# Patient Record
Sex: MALE | Race: WHITE | Employment: OTHER | URBAN - METROPOLITAN AREA
[De-identification: names, ages, dates, MRNs, and addresses within clinical notes are randomized per-mention and may not be internally consistent; named-entity substitution may affect disease eponyms.]

---

## 2017-10-23 PROBLEM — M50.30 DEGENERATIVE DISC DISEASE, CERVICAL: Status: ACTIVE | Noted: 2017-10-23

## 2018-04-09 DIAGNOSIS — F41.1 GAD (GENERALIZED ANXIETY DISORDER): ICD-10-CM

## 2018-04-09 RX ORDER — TRIAMCINOLONE ACETONIDE 1 MG/G
CREAM TOPICAL
Qty: 80 G | Refills: 0 | Status: SHIPPED | OUTPATIENT
Start: 2018-04-09 | End: 2019-01-15

## 2018-04-09 RX ORDER — BUPROPION HYDROCHLORIDE 150 MG/1
TABLET ORAL
Qty: 30 TABLET | Refills: 4 | Status: SHIPPED | OUTPATIENT
Start: 2018-04-09 | End: 2018-09-14 | Stop reason: SDUPTHER

## 2018-04-10 ENCOUNTER — HOSPITAL ENCOUNTER (OUTPATIENT)
Age: 58
Discharge: HOME | End: 2018-04-12
Payer: MEDICARE

## 2018-04-10 ENCOUNTER — OFFICE VISIT (OUTPATIENT)
Dept: PRIMARY CARE CLINIC | Age: 58
End: 2018-04-10
Payer: MEDICARE

## 2018-04-10 VITALS
TEMPERATURE: 98.2 F | BODY MASS INDEX: 32.57 KG/M2 | HEART RATE: 72 BPM | DIASTOLIC BLOOD PRESSURE: 70 MMHG | OXYGEN SATURATION: 98 % | WEIGHT: 227 LBS | SYSTOLIC BLOOD PRESSURE: 110 MMHG

## 2018-04-10 PROCEDURE — G8417 CALC BMI ABV UP PARAM F/U: HCPCS | Performed by: FAMILY MEDICINE

## 2018-04-10 PROCEDURE — 1036F TOBACCO NON-USER: CPT | Performed by: FAMILY MEDICINE

## 2018-04-10 PROCEDURE — 80061 LIPID PANEL: CPT

## 2018-04-10 PROCEDURE — 83036 HEMOGLOBIN GLYCOSYLATED A1C: CPT

## 2018-04-10 PROCEDURE — 3017F COLORECTAL CA SCREEN DOC REV: CPT | Performed by: FAMILY MEDICINE

## 2018-04-10 PROCEDURE — 3045F PR MOST RECENT HEMOGLOBIN A1C LEVEL 7.0-9.0%: CPT | Performed by: FAMILY MEDICINE

## 2018-04-10 PROCEDURE — G8427 DOCREV CUR MEDS BY ELIG CLIN: HCPCS | Performed by: FAMILY MEDICINE

## 2018-04-10 PROCEDURE — 80053 COMPREHEN METABOLIC PANEL: CPT

## 2018-04-10 PROCEDURE — 99213 OFFICE O/P EST LOW 20 MIN: CPT | Performed by: FAMILY MEDICINE

## 2018-04-10 ASSESSMENT — ENCOUNTER SYMPTOMS
VISUAL CHANGE: 0
BLURRED VISION: 0
CONSTIPATION: 0
DIARRHEA: 0

## 2018-04-11 LAB
ALBUMIN SERPL-MCNC: 4.3 G/DL (ref 3.5–5.2)
ALP BLD-CCNC: 87 U/L (ref 40–129)
ALT SERPL-CCNC: 20 U/L (ref 0–40)
ANION GAP SERPL CALCULATED.3IONS-SCNC: 17 MMOL/L (ref 7–16)
AST SERPL-CCNC: 22 U/L (ref 0–39)
BILIRUB SERPL-MCNC: 0.6 MG/DL (ref 0–1.2)
BUN BLDV-MCNC: 12 MG/DL (ref 8–23)
CALCIUM SERPL-MCNC: 9.2 MG/DL (ref 8.6–10.2)
CHLORIDE BLD-SCNC: 101 MMOL/L (ref 98–107)
CHOLESTEROL, TOTAL: 155 MG/DL (ref 0–199)
CO2: 23 MMOL/L (ref 22–29)
CREAT SERPL-MCNC: 0.9 MG/DL (ref 0.7–1.2)
GFR AFRICAN AMERICAN: >60
GFR NON-AFRICAN AMERICAN: >60 ML/MIN/1.73
GLUCOSE BLD-MCNC: 87 MG/DL (ref 74–109)
HBA1C MFR BLD: 6.3 % (ref 4.8–5.9)
HDLC SERPL-MCNC: 41 MG/DL
LDL CHOLESTEROL CALCULATED: 81 MG/DL (ref 0–99)
POTASSIUM SERPL-SCNC: 5.3 MMOL/L (ref 3.5–5)
SODIUM BLD-SCNC: 141 MMOL/L (ref 132–146)
TOTAL PROTEIN: 7 G/DL (ref 6.4–8.3)
TRIGL SERPL-MCNC: 167 MG/DL (ref 0–149)
VLDLC SERPL CALC-MCNC: 33 MG/DL

## 2018-04-12 ENCOUNTER — TELEPHONE (OUTPATIENT)
Dept: PRIMARY CARE CLINIC | Age: 58
End: 2018-04-12

## 2018-04-16 DIAGNOSIS — R42 DIZZINESS: ICD-10-CM

## 2018-04-16 RX ORDER — TAMSULOSIN HYDROCHLORIDE 0.4 MG/1
0.4 CAPSULE ORAL DAILY
Qty: 30 CAPSULE | Refills: 5 | Status: SHIPPED | OUTPATIENT
Start: 2018-04-16 | End: 2019-01-15

## 2018-04-16 RX ORDER — PANTOPRAZOLE SODIUM 40 MG/1
40 TABLET, DELAYED RELEASE ORAL DAILY
Qty: 30 TABLET | Refills: 5 | Status: SHIPPED | OUTPATIENT
Start: 2018-04-16 | End: 2019-01-15

## 2018-04-16 RX ORDER — SCOLOPAMINE TRANSDERMAL SYSTEM 1 MG/1
1 PATCH, EXTENDED RELEASE TRANSDERMAL
Qty: 4 PATCH | Refills: 0 | Status: SHIPPED | OUTPATIENT
Start: 2018-04-16 | End: 2018-05-31 | Stop reason: SDUPTHER

## 2018-04-18 ENCOUNTER — NURSE ONLY (OUTPATIENT)
Dept: NON INVASIVE DIAGNOSTICS | Age: 58
End: 2018-04-18
Payer: MEDICARE

## 2018-04-18 ENCOUNTER — TELEPHONE (OUTPATIENT)
Dept: NON INVASIVE DIAGNOSTICS | Age: 58
End: 2018-04-18

## 2018-04-18 DIAGNOSIS — Z95.810 DUAL IMPLANTABLE CARDIOVERTER-DEFIBRILLATOR IN SITU: Primary | ICD-10-CM

## 2018-04-18 DIAGNOSIS — I47.20 VT (VENTRICULAR TACHYCARDIA): ICD-10-CM

## 2018-04-18 PROCEDURE — 93295 DEV INTERROG REMOTE 1/2/MLT: CPT | Performed by: INTERNAL MEDICINE

## 2018-04-18 PROCEDURE — 93296 REM INTERROG EVL PM/IDS: CPT | Performed by: INTERNAL MEDICINE

## 2018-05-08 RX ORDER — LANCETS 30 GAUGE
EACH MISCELLANEOUS
Qty: 100 EACH | Refills: 3 | Status: SHIPPED | OUTPATIENT
Start: 2018-05-08 | End: 2019-01-15

## 2018-05-31 DIAGNOSIS — R42 DIZZINESS: ICD-10-CM

## 2018-05-31 RX ORDER — SCOLOPAMINE TRANSDERMAL SYSTEM 1 MG/1
1 PATCH, EXTENDED RELEASE TRANSDERMAL
Qty: 4 PATCH | Refills: 0 | Status: SHIPPED | OUTPATIENT
Start: 2018-05-31 | End: 2018-08-17 | Stop reason: SDUPTHER

## 2018-07-11 RX ORDER — ATORVASTATIN CALCIUM 10 MG/1
10 TABLET, FILM COATED ORAL DAILY
Qty: 90 TABLET | Refills: 1 | Status: SHIPPED | OUTPATIENT
Start: 2018-07-11 | End: 2018-11-01 | Stop reason: SDUPTHER

## 2018-07-11 RX ORDER — LISINOPRIL 20 MG/1
20 TABLET ORAL DAILY
Qty: 90 TABLET | Refills: 1 | Status: SHIPPED | OUTPATIENT
Start: 2018-07-11 | End: 2018-08-13 | Stop reason: SDUPTHER

## 2018-07-11 NOTE — TELEPHONE ENCOUNTER
From: Cande Mcneill  Sent: 7/11/2018 7:59 AM EDT  Subject: Medication Renewal Request    Ishaan Carl.  Jeramie Wright would like a refill of the following medications:     atorvastatin (LIPITOR) 10 MG tablet [Liban Berger DO]     lisinopril (PRINIVIL;ZESTRIL) 20 MG tablet [Liban Berger DO]     esomeprazole (NEXIUM) 20 MG delayed release capsule Maegan Hamm DO]    Preferred pharmacy: [facility] #[id number] [facility] [place] [id number] [address]

## 2018-07-18 ENCOUNTER — NURSE ONLY (OUTPATIENT)
Dept: NON INVASIVE DIAGNOSTICS | Age: 58
End: 2018-07-18
Payer: MEDICARE

## 2018-07-18 DIAGNOSIS — Z95.810 DUAL IMPLANTABLE CARDIOVERTER-DEFIBRILLATOR IN SITU: Primary | ICD-10-CM

## 2018-07-18 DIAGNOSIS — I47.20 VT (VENTRICULAR TACHYCARDIA): ICD-10-CM

## 2018-07-18 PROCEDURE — 93296 REM INTERROG EVL PM/IDS: CPT | Performed by: INTERNAL MEDICINE

## 2018-07-18 PROCEDURE — 93295 DEV INTERROG REMOTE 1/2/MLT: CPT | Performed by: INTERNAL MEDICINE

## 2018-07-20 ENCOUNTER — TELEPHONE (OUTPATIENT)
Dept: NON INVASIVE DIAGNOSTICS | Age: 58
End: 2018-07-20

## 2018-07-20 NOTE — TELEPHONE ENCOUNTER
Left VM for patient to call office. We have received your remote transmission. Our staff will contact you if there is anything that needs to be discussed. Your next appointment is October 17, 2018 for remote transmission.

## 2018-08-13 RX ORDER — LISINOPRIL 20 MG/1
20 TABLET ORAL DAILY
Qty: 90 TABLET | Refills: 0 | Status: SHIPPED | OUTPATIENT
Start: 2018-08-13 | End: 2018-11-01 | Stop reason: SDUPTHER

## 2018-08-13 NOTE — TELEPHONE ENCOUNTER
From: Jennie Norton  Sent: 8/13/2018 8:05 AM EDT  Subject: Medication Renewal Request    Crystal Pee.  Promise Golden would like a refill of the following medications:     lisinopril (PRINIVIL;ZESTRIL) 20 MG tablet [Liban Berger, ]     esomeprazole (NEXIUM) 20 MG delayed release capsule Torin Tomas, ]    Preferred pharmacy: [facility] #[id number] [facility] [place] [id number] [address]

## 2018-08-17 DIAGNOSIS — R42 DIZZINESS: ICD-10-CM

## 2018-08-17 RX ORDER — SCOLOPAMINE TRANSDERMAL SYSTEM 1 MG/1
1 PATCH, EXTENDED RELEASE TRANSDERMAL
Qty: 4 PATCH | Refills: 0 | Status: SHIPPED | OUTPATIENT
Start: 2018-08-17 | End: 2018-11-01 | Stop reason: SDUPTHER

## 2018-09-14 DIAGNOSIS — F41.1 GAD (GENERALIZED ANXIETY DISORDER): ICD-10-CM

## 2018-09-14 RX ORDER — BUPROPION HYDROCHLORIDE 150 MG/1
TABLET ORAL
Qty: 30 TABLET | Refills: 1 | Status: SHIPPED | OUTPATIENT
Start: 2018-09-14 | End: 2018-11-01 | Stop reason: SDUPTHER

## 2018-10-17 ENCOUNTER — NURSE ONLY (OUTPATIENT)
Dept: NON INVASIVE DIAGNOSTICS | Age: 58
End: 2018-10-17
Payer: MEDICARE

## 2018-10-17 DIAGNOSIS — Z95.810 DUAL IMPLANTABLE CARDIOVERTER-DEFIBRILLATOR IN SITU: Primary | ICD-10-CM

## 2018-10-17 DIAGNOSIS — I47.20 VT (VENTRICULAR TACHYCARDIA): ICD-10-CM

## 2018-10-17 PROCEDURE — 93283 PRGRMG EVAL IMPLANTABLE DFB: CPT | Performed by: INTERNAL MEDICINE

## 2018-10-22 ENCOUNTER — NURSE ONLY (OUTPATIENT)
Dept: NON INVASIVE DIAGNOSTICS | Age: 58
End: 2018-10-22
Payer: MEDICARE

## 2018-10-22 DIAGNOSIS — I47.20 VT (VENTRICULAR TACHYCARDIA): ICD-10-CM

## 2018-10-22 DIAGNOSIS — Z95.810 DUAL IMPLANTABLE CARDIOVERTER-DEFIBRILLATOR IN SITU: Primary | ICD-10-CM

## 2018-10-22 PROCEDURE — 93296 REM INTERROG EVL PM/IDS: CPT | Performed by: INTERNAL MEDICINE

## 2018-10-22 PROCEDURE — 93295 DEV INTERROG REMOTE 1/2/MLT: CPT | Performed by: INTERNAL MEDICINE

## 2018-10-24 ENCOUNTER — TELEPHONE (OUTPATIENT)
Dept: NON INVASIVE DIAGNOSTICS | Age: 58
End: 2018-10-24

## 2018-11-01 DIAGNOSIS — R42 DIZZINESS: ICD-10-CM

## 2018-11-01 DIAGNOSIS — F41.1 GAD (GENERALIZED ANXIETY DISORDER): ICD-10-CM

## 2018-11-01 RX ORDER — SCOLOPAMINE TRANSDERMAL SYSTEM 1 MG/1
1 PATCH, EXTENDED RELEASE TRANSDERMAL
Qty: 4 PATCH | Refills: 0 | Status: SHIPPED
Start: 2018-11-01 | End: 2020-03-03 | Stop reason: SDUPTHER

## 2018-11-01 RX ORDER — BUPROPION HYDROCHLORIDE 150 MG/1
TABLET ORAL
Qty: 30 TABLET | Refills: 1 | Status: SHIPPED | OUTPATIENT
Start: 2018-11-01 | End: 2019-02-04 | Stop reason: SDUPTHER

## 2018-11-01 RX ORDER — LISINOPRIL 20 MG/1
20 TABLET ORAL DAILY
Qty: 90 TABLET | Refills: 0 | Status: SHIPPED | OUTPATIENT
Start: 2018-11-01 | End: 2019-01-04 | Stop reason: SDUPTHER

## 2018-11-01 RX ORDER — ATORVASTATIN CALCIUM 10 MG/1
10 TABLET, FILM COATED ORAL DAILY
Qty: 90 TABLET | Refills: 1 | Status: SHIPPED | OUTPATIENT
Start: 2018-11-01 | End: 2019-03-15 | Stop reason: SDUPTHER

## 2018-11-01 NOTE — TELEPHONE ENCOUNTER
From: Taisha Beltrán  Sent: 10/31/2018 6:35 PM EDT  Subject: Medication Renewal Request    Juana Wynn.  Denae Giacomo would like a refill of the following medications:     atorvastatin (LIPITOR) 10 MG tablet [Liban Berger, DO]     lisinopril (PRINIVIL;ZESTRIL) 20 MG tablet [Liban Berger DO]     scopolamine (TRANSDERM-SCOP, 1.5 MG,) transdermal patch [Liban Berger, DO]     buPROPion (WELLBUTRIN XL) 150 MG extended release tablet [Liban Berger DO]     esomeprazole (NEXIUM) 20 MG delayed release capsule Ruba Gregg, DO]    Preferred pharmacy: [facility] #[id number] [facility] [place] [id number] [address]

## 2019-01-04 RX ORDER — LISINOPRIL 20 MG/1
20 TABLET ORAL DAILY
Qty: 90 TABLET | Refills: 0 | Status: SHIPPED | OUTPATIENT
Start: 2019-01-04 | End: 2019-02-04 | Stop reason: SDUPTHER

## 2019-01-15 ENCOUNTER — OFFICE VISIT (OUTPATIENT)
Dept: NON INVASIVE DIAGNOSTICS | Age: 59
End: 2019-01-15
Payer: MEDICARE

## 2019-01-15 VITALS
BODY MASS INDEX: 33.64 KG/M2 | SYSTOLIC BLOOD PRESSURE: 122 MMHG | HEIGHT: 70 IN | DIASTOLIC BLOOD PRESSURE: 86 MMHG | WEIGHT: 235 LBS | RESPIRATION RATE: 18 BRPM | HEART RATE: 62 BPM

## 2019-01-15 DIAGNOSIS — Z95.810 DUAL IMPLANTABLE CARDIOVERTER-DEFIBRILLATOR IN SITU: Primary | ICD-10-CM

## 2019-01-15 PROCEDURE — 3017F COLORECTAL CA SCREEN DOC REV: CPT | Performed by: INTERNAL MEDICINE

## 2019-01-15 PROCEDURE — G8417 CALC BMI ABV UP PARAM F/U: HCPCS | Performed by: INTERNAL MEDICINE

## 2019-01-15 PROCEDURE — G8484 FLU IMMUNIZE NO ADMIN: HCPCS | Performed by: INTERNAL MEDICINE

## 2019-01-15 PROCEDURE — G8427 DOCREV CUR MEDS BY ELIG CLIN: HCPCS | Performed by: INTERNAL MEDICINE

## 2019-01-15 PROCEDURE — 1036F TOBACCO NON-USER: CPT | Performed by: INTERNAL MEDICINE

## 2019-01-15 PROCEDURE — 99213 OFFICE O/P EST LOW 20 MIN: CPT | Performed by: INTERNAL MEDICINE

## 2019-01-15 PROCEDURE — 93283 PRGRMG EVAL IMPLANTABLE DFB: CPT | Performed by: INTERNAL MEDICINE

## 2019-01-18 ENCOUNTER — APPOINTMENT (OUTPATIENT)
Dept: CT IMAGING | Age: 59
End: 2019-01-18
Payer: MEDICARE

## 2019-01-18 ENCOUNTER — HOSPITAL ENCOUNTER (EMERGENCY)
Age: 59
Discharge: HOME | End: 2019-01-18
Attending: EMERGENCY MEDICINE
Payer: MEDICARE

## 2019-01-18 VITALS
OXYGEN SATURATION: 98 % | WEIGHT: 230 LBS | BODY MASS INDEX: 32.93 KG/M2 | DIASTOLIC BLOOD PRESSURE: 81 MMHG | RESPIRATION RATE: 14 BRPM | TEMPERATURE: 98.3 F | HEIGHT: 70 IN | SYSTOLIC BLOOD PRESSURE: 133 MMHG | HEART RATE: 69 BPM

## 2019-01-18 DIAGNOSIS — K57.32 DIVERTICULITIS OF COLON: Primary | ICD-10-CM

## 2019-01-18 LAB
BILIRUBIN URINE: NEGATIVE
BLOOD, URINE: NEGATIVE
CLARITY: CLEAR
COLOR: YELLOW
GLUCOSE URINE: NEGATIVE MG/DL
KETONES, URINE: NEGATIVE MG/DL
LEUKOCYTE ESTERASE, URINE: NEGATIVE
NITRITE, URINE: NEGATIVE
PH UA: 6 (ref 5–9)
PROTEIN UA: NEGATIVE MG/DL
SPECIFIC GRAVITY UA: 1.02 (ref 1–1.03)
UROBILINOGEN, URINE: 0.2 E.U./DL

## 2019-01-18 PROCEDURE — 87088 URINE BACTERIA CULTURE: CPT

## 2019-01-18 PROCEDURE — 2500000003 HC RX 250 WO HCPCS: Performed by: EMERGENCY MEDICINE

## 2019-01-18 PROCEDURE — 96375 TX/PRO/DX INJ NEW DRUG ADDON: CPT

## 2019-01-18 PROCEDURE — 2580000003 HC RX 258: Performed by: EMERGENCY MEDICINE

## 2019-01-18 PROCEDURE — 81003 URINALYSIS AUTO W/O SCOPE: CPT

## 2019-01-18 PROCEDURE — 74176 CT ABD & PELVIS W/O CONTRAST: CPT

## 2019-01-18 PROCEDURE — 99284 EMERGENCY DEPT VISIT MOD MDM: CPT

## 2019-01-18 PROCEDURE — 6360000002 HC RX W HCPCS: Performed by: EMERGENCY MEDICINE

## 2019-01-18 PROCEDURE — 96365 THER/PROPH/DIAG IV INF INIT: CPT

## 2019-01-18 RX ORDER — AMOXICILLIN AND CLAVULANATE POTASSIUM 875; 125 MG/1; MG/1
1 TABLET, FILM COATED ORAL 2 TIMES DAILY
Qty: 20 TABLET | Refills: 0 | Status: SHIPPED | OUTPATIENT
Start: 2019-01-18 | End: 2019-01-28

## 2019-01-18 RX ORDER — TAMSULOSIN HYDROCHLORIDE 0.4 MG/1
0.4 CAPSULE ORAL NIGHTLY
COMMUNITY
End: 2019-03-15

## 2019-01-18 RX ORDER — 0.9 % SODIUM CHLORIDE 0.9 %
500 INTRAVENOUS SOLUTION INTRAVENOUS ONCE
Status: COMPLETED | OUTPATIENT
Start: 2019-01-18 | End: 2019-01-18

## 2019-01-18 RX ORDER — ONDANSETRON 8 MG/1
8 TABLET, ORALLY DISINTEGRATING ORAL EVERY 8 HOURS PRN
Qty: 10 TABLET | Refills: 1 | Status: SHIPPED | OUTPATIENT
Start: 2019-01-18 | End: 2019-01-24 | Stop reason: ALTCHOICE

## 2019-01-18 RX ORDER — METRONIDAZOLE 500 MG/1
500 TABLET ORAL 4 TIMES DAILY
Qty: 40 TABLET | Refills: 0 | Status: SHIPPED | OUTPATIENT
Start: 2019-01-18 | End: 2019-01-28

## 2019-01-18 RX ORDER — NAPROXEN 500 MG/1
500 TABLET ORAL 2 TIMES DAILY
Qty: 14 TABLET | Refills: 0 | Status: SHIPPED | OUTPATIENT
Start: 2019-01-18 | End: 2019-01-24 | Stop reason: ALTCHOICE

## 2019-01-18 RX ORDER — DOCUSATE SODIUM 100 MG/1
100 CAPSULE, LIQUID FILLED ORAL 2 TIMES DAILY
Qty: 12 CAPSULE | Refills: 1 | Status: ON HOLD | OUTPATIENT
Start: 2019-01-18 | End: 2019-01-25

## 2019-01-18 RX ORDER — KETOROLAC TROMETHAMINE 30 MG/ML
30 INJECTION, SOLUTION INTRAMUSCULAR; INTRAVENOUS ONCE
Status: COMPLETED | OUTPATIENT
Start: 2019-01-18 | End: 2019-01-18

## 2019-01-18 RX ADMIN — METRONIDAZOLE 500 MG: 500 INJECTION, SOLUTION INTRAVENOUS at 19:54

## 2019-01-18 RX ADMIN — KETOROLAC TROMETHAMINE 30 MG: 30 INJECTION, SOLUTION INTRAMUSCULAR; INTRAVENOUS at 19:08

## 2019-01-18 RX ADMIN — CEFTRIAXONE SODIUM 1 G: 1 INJECTION, POWDER, FOR SOLUTION INTRAMUSCULAR; INTRAVENOUS at 19:54

## 2019-01-18 RX ADMIN — SODIUM CHLORIDE 500 ML: 9 INJECTION, SOLUTION INTRAVENOUS at 19:08

## 2019-01-18 ASSESSMENT — PAIN SCALES - GENERAL
PAINLEVEL_OUTOF10: 4
PAINLEVEL_OUTOF10: 1
PAINLEVEL_OUTOF10: 0

## 2019-01-18 ASSESSMENT — PAIN DESCRIPTION - ORIENTATION: ORIENTATION: RIGHT;LEFT;LOWER

## 2019-01-18 ASSESSMENT — PAIN DESCRIPTION - LOCATION: LOCATION: ABDOMEN

## 2019-01-21 LAB — URINE CULTURE, ROUTINE: NORMAL

## 2019-01-23 ENCOUNTER — NURSE ONLY (OUTPATIENT)
Dept: NON INVASIVE DIAGNOSTICS | Age: 59
End: 2019-01-23
Payer: MEDICARE

## 2019-01-23 ENCOUNTER — TELEPHONE (OUTPATIENT)
Dept: NON INVASIVE DIAGNOSTICS | Age: 59
End: 2019-01-23

## 2019-01-23 ENCOUNTER — APPOINTMENT (OUTPATIENT)
Dept: ULTRASOUND IMAGING | Age: 59
End: 2019-01-23
Payer: MEDICARE

## 2019-01-23 ENCOUNTER — APPOINTMENT (OUTPATIENT)
Dept: CT IMAGING | Age: 59
End: 2019-01-23
Payer: MEDICARE

## 2019-01-23 ENCOUNTER — HOSPITAL ENCOUNTER (EMERGENCY)
Age: 59
Discharge: HOME | End: 2019-01-24
Payer: MEDICARE

## 2019-01-23 VITALS
OXYGEN SATURATION: 99 % | SYSTOLIC BLOOD PRESSURE: 178 MMHG | HEIGHT: 70 IN | WEIGHT: 230 LBS | DIASTOLIC BLOOD PRESSURE: 99 MMHG | HEART RATE: 61 BPM | RESPIRATION RATE: 16 BRPM | BODY MASS INDEX: 32.93 KG/M2 | TEMPERATURE: 97.6 F

## 2019-01-23 DIAGNOSIS — Z95.810 DUAL IMPLANTABLE CARDIOVERTER-DEFIBRILLATOR IN SITU: Primary | ICD-10-CM

## 2019-01-23 DIAGNOSIS — N13.2 HYDRONEPHROSIS WITH URINARY OBSTRUCTION DUE TO RENAL CALCULUS: ICD-10-CM

## 2019-01-23 DIAGNOSIS — I47.20 VT (VENTRICULAR TACHYCARDIA): ICD-10-CM

## 2019-01-23 DIAGNOSIS — N20.0 KIDNEY STONE: Primary | ICD-10-CM

## 2019-01-23 LAB
ALBUMIN SERPL-MCNC: 4.5 G/DL (ref 3.5–5.2)
ALP BLD-CCNC: 88 U/L (ref 40–129)
ALT SERPL-CCNC: 57 U/L (ref 0–40)
ANION GAP SERPL CALCULATED.3IONS-SCNC: 12 MMOL/L (ref 7–16)
AST SERPL-CCNC: 52 U/L (ref 0–39)
BASOPHILS ABSOLUTE: 0.07 E9/L (ref 0–0.2)
BASOPHILS RELATIVE PERCENT: 0.5 % (ref 0–2)
BILIRUB SERPL-MCNC: 0.5 MG/DL (ref 0–1.2)
BUN BLDV-MCNC: 16 MG/DL (ref 8–23)
CALCIUM SERPL-MCNC: 9.4 MG/DL (ref 8.6–10.2)
CHLORIDE BLD-SCNC: 100 MMOL/L (ref 98–107)
CO2: 26 MMOL/L (ref 22–29)
CREAT SERPL-MCNC: 1.1 MG/DL (ref 0.7–1.2)
EOSINOPHILS ABSOLUTE: 0.18 E9/L (ref 0.05–0.5)
EOSINOPHILS RELATIVE PERCENT: 1.2 % (ref 0–6)
GFR AFRICAN AMERICAN: >60
GFR NON-AFRICAN AMERICAN: >60 ML/MIN/1.73
GLUCOSE BLD-MCNC: 180 MG/DL (ref 74–99)
HCT VFR BLD CALC: 43.6 % (ref 37–54)
HEMOGLOBIN: 14.8 G/DL (ref 12.5–16.5)
IMMATURE GRANULOCYTES #: 0.11 E9/L
IMMATURE GRANULOCYTES %: 0.7 % (ref 0–5)
LACTIC ACID: 2.2 MMOL/L (ref 0.5–2.2)
LIPASE: 32 U/L (ref 13–60)
LYMPHOCYTES ABSOLUTE: 1.83 E9/L (ref 1.5–4)
LYMPHOCYTES RELATIVE PERCENT: 11.9 % (ref 20–42)
MCH RBC QN AUTO: 29.2 PG (ref 26–35)
MCHC RBC AUTO-ENTMCNC: 33.9 % (ref 32–34.5)
MCV RBC AUTO: 86 FL (ref 80–99.9)
MONOCYTES ABSOLUTE: 1.16 E9/L (ref 0.1–0.95)
MONOCYTES RELATIVE PERCENT: 7.5 % (ref 2–12)
NEUTROPHILS ABSOLUTE: 12.08 E9/L (ref 1.8–7.3)
NEUTROPHILS RELATIVE PERCENT: 78.2 % (ref 43–80)
PDW BLD-RTO: 13.2 FL (ref 11.5–15)
PLATELET # BLD: 243 E9/L (ref 130–450)
PMV BLD AUTO: 9.6 FL (ref 7–12)
POTASSIUM SERPL-SCNC: 4.2 MMOL/L (ref 3.5–5)
RBC # BLD: 5.07 E12/L (ref 3.8–5.8)
SODIUM BLD-SCNC: 138 MMOL/L (ref 132–146)
TOTAL PROTEIN: 7.1 G/DL (ref 6.4–8.3)
TROPONIN: <0.01 NG/ML (ref 0–0.03)
WBC # BLD: 15.4 E9/L (ref 4.5–11.5)

## 2019-01-23 PROCEDURE — 93296 REM INTERROG EVL PM/IDS: CPT | Performed by: INTERNAL MEDICINE

## 2019-01-23 PROCEDURE — 99284 EMERGENCY DEPT VISIT MOD MDM: CPT

## 2019-01-23 PROCEDURE — 93975 VASCULAR STUDY: CPT

## 2019-01-23 PROCEDURE — 96375 TX/PRO/DX INJ NEW DRUG ADDON: CPT

## 2019-01-23 PROCEDURE — 74177 CT ABD & PELVIS W/CONTRAST: CPT

## 2019-01-23 PROCEDURE — 93295 DEV INTERROG REMOTE 1/2/MLT: CPT | Performed by: INTERNAL MEDICINE

## 2019-01-23 PROCEDURE — 83690 ASSAY OF LIPASE: CPT

## 2019-01-23 PROCEDURE — S0028 INJECTION, FAMOTIDINE, 20 MG: HCPCS | Performed by: NURSE PRACTITIONER

## 2019-01-23 PROCEDURE — 83605 ASSAY OF LACTIC ACID: CPT

## 2019-01-23 PROCEDURE — 85025 COMPLETE CBC W/AUTO DIFF WBC: CPT

## 2019-01-23 PROCEDURE — 81001 URINALYSIS AUTO W/SCOPE: CPT

## 2019-01-23 PROCEDURE — 96374 THER/PROPH/DIAG INJ IV PUSH: CPT

## 2019-01-23 PROCEDURE — 84484 ASSAY OF TROPONIN QUANT: CPT

## 2019-01-23 PROCEDURE — 2580000003 HC RX 258: Performed by: NURSE PRACTITIONER

## 2019-01-23 PROCEDURE — 76870 US EXAM SCROTUM: CPT

## 2019-01-23 PROCEDURE — 6360000004 HC RX CONTRAST MEDICATION: Performed by: RADIOLOGY

## 2019-01-23 PROCEDURE — 2580000003 HC RX 258: Performed by: RADIOLOGY

## 2019-01-23 PROCEDURE — 80053 COMPREHEN METABOLIC PANEL: CPT

## 2019-01-23 PROCEDURE — 6360000002 HC RX W HCPCS: Performed by: NURSE PRACTITIONER

## 2019-01-23 RX ORDER — SODIUM CHLORIDE 0.9 % (FLUSH) 0.9 %
10 SYRINGE (ML) INJECTION 2 TIMES DAILY
Status: DISCONTINUED | OUTPATIENT
Start: 2019-01-24 | End: 2019-01-24 | Stop reason: HOSPADM

## 2019-01-23 RX ORDER — ONDANSETRON 2 MG/ML
4 INJECTION INTRAMUSCULAR; INTRAVENOUS ONCE
Status: COMPLETED | OUTPATIENT
Start: 2019-01-23 | End: 2019-01-23

## 2019-01-23 RX ORDER — MORPHINE SULFATE 2 MG/ML
4 INJECTION, SOLUTION INTRAMUSCULAR; INTRAVENOUS ONCE
Status: COMPLETED | OUTPATIENT
Start: 2019-01-23 | End: 2019-01-23

## 2019-01-23 RX ORDER — 0.9 % SODIUM CHLORIDE 0.9 %
1000 INTRAVENOUS SOLUTION INTRAVENOUS ONCE
Status: COMPLETED | OUTPATIENT
Start: 2019-01-23 | End: 2019-01-24

## 2019-01-23 RX ADMIN — Medication 10 ML: at 23:54

## 2019-01-23 RX ADMIN — FAMOTIDINE 20 MG: 10 INJECTION, SOLUTION INTRAVENOUS at 23:11

## 2019-01-23 RX ADMIN — SODIUM CHLORIDE 1000 ML: 9 INJECTION, SOLUTION INTRAVENOUS at 23:11

## 2019-01-23 RX ADMIN — IOPAMIDOL 110 ML: 755 INJECTION, SOLUTION INTRAVENOUS at 23:57

## 2019-01-23 RX ADMIN — MORPHINE SULFATE 4 MG: 2 INJECTION, SOLUTION INTRAMUSCULAR; INTRAVENOUS at 23:18

## 2019-01-23 RX ADMIN — HYDROMORPHONE HYDROCHLORIDE 0.5 MG: 1 INJECTION, SOLUTION INTRAMUSCULAR; INTRAVENOUS; SUBCUTANEOUS at 23:41

## 2019-01-23 RX ADMIN — ONDANSETRON 4 MG: 2 INJECTION INTRAMUSCULAR; INTRAVENOUS at 23:11

## 2019-01-23 ASSESSMENT — PAIN DESCRIPTION - PAIN TYPE: TYPE: ACUTE PAIN

## 2019-01-23 ASSESSMENT — PAIN DESCRIPTION - ORIENTATION: ORIENTATION: RIGHT;LOWER

## 2019-01-23 ASSESSMENT — PAIN DESCRIPTION - LOCATION: LOCATION: ABDOMEN

## 2019-01-23 ASSESSMENT — PAIN SCALES - GENERAL: PAINLEVEL_OUTOF10: 8

## 2019-01-24 ENCOUNTER — ANESTHESIA EVENT (OUTPATIENT)
Dept: OPERATING ROOM | Age: 59
End: 2019-01-24
Payer: MEDICARE

## 2019-01-24 LAB
BACTERIA: ABNORMAL /HPF
BILIRUBIN URINE: NEGATIVE
BLOOD, URINE: ABNORMAL
CLARITY: CLEAR
COLOR: YELLOW
GLUCOSE URINE: NEGATIVE MG/DL
KETONES, URINE: NEGATIVE MG/DL
LEUKOCYTE ESTERASE, URINE: NEGATIVE
NITRITE, URINE: NEGATIVE
PH UA: 6 (ref 5–9)
PROTEIN UA: NEGATIVE MG/DL
RBC UA: >20 /HPF (ref 0–2)
SPECIFIC GRAVITY UA: 1.02 (ref 1–1.03)
UROBILINOGEN, URINE: 0.2 E.U./DL
WBC UA: ABNORMAL /HPF (ref 0–5)

## 2019-01-24 PROCEDURE — 6360000002 HC RX W HCPCS: Performed by: NURSE PRACTITIONER

## 2019-01-24 PROCEDURE — 96375 TX/PRO/DX INJ NEW DRUG ADDON: CPT

## 2019-01-24 RX ORDER — KETOROLAC TROMETHAMINE 30 MG/ML
30 INJECTION, SOLUTION INTRAMUSCULAR; INTRAVENOUS ONCE
Status: COMPLETED | OUTPATIENT
Start: 2019-01-24 | End: 2019-01-24

## 2019-01-24 RX ORDER — KETOROLAC TROMETHAMINE 10 MG/1
10 TABLET, FILM COATED ORAL EVERY 6 HOURS PRN
Qty: 20 TABLET | Refills: 0 | Status: SHIPPED | OUTPATIENT
Start: 2019-01-24 | End: 2019-03-15

## 2019-01-24 RX ORDER — SODIUM CHLORIDE 0.9 % (FLUSH) 0.9 %
10 SYRINGE (ML) INJECTION EVERY 12 HOURS SCHEDULED
Status: CANCELLED | OUTPATIENT
Start: 2019-01-24

## 2019-01-24 RX ORDER — ONDANSETRON 4 MG/1
4 TABLET, ORALLY DISINTEGRATING ORAL EVERY 8 HOURS PRN
Qty: 24 TABLET | Refills: 0 | Status: SHIPPED | OUTPATIENT
Start: 2019-01-24 | End: 2019-03-15

## 2019-01-24 RX ORDER — OXYCODONE HYDROCHLORIDE AND ACETAMINOPHEN 5; 325 MG/1; MG/1
1 TABLET ORAL EVERY 6 HOURS PRN
Qty: 10 TABLET | Refills: 0 | Status: ON HOLD | OUTPATIENT
Start: 2019-01-24 | End: 2019-01-25

## 2019-01-24 RX ORDER — SODIUM CHLORIDE 0.9 % (FLUSH) 0.9 %
10 SYRINGE (ML) INJECTION PRN
Status: CANCELLED | OUTPATIENT
Start: 2019-01-24

## 2019-01-24 RX ORDER — CIPROFLOXACIN 2 MG/ML
400 INJECTION, SOLUTION INTRAVENOUS
Status: CANCELLED | OUTPATIENT
Start: 2019-01-24 | End: 2019-01-24

## 2019-01-24 RX ADMIN — KETOROLAC TROMETHAMINE 30 MG: 30 INJECTION, SOLUTION INTRAMUSCULAR; INTRAVENOUS at 01:07

## 2019-01-25 ENCOUNTER — APPOINTMENT (OUTPATIENT)
Dept: GENERAL RADIOLOGY | Age: 59
End: 2019-01-25
Attending: UROLOGY
Payer: MEDICARE

## 2019-01-25 ENCOUNTER — HOSPITAL ENCOUNTER (OUTPATIENT)
Age: 59
Setting detail: OUTPATIENT SURGERY
Discharge: HOME | End: 2019-01-25
Attending: UROLOGY | Admitting: UROLOGY
Payer: MEDICARE

## 2019-01-25 ENCOUNTER — ANESTHESIA (OUTPATIENT)
Dept: OPERATING ROOM | Age: 59
End: 2019-01-25
Payer: MEDICARE

## 2019-01-25 VITALS
BODY MASS INDEX: 32.93 KG/M2 | OXYGEN SATURATION: 96 % | SYSTOLIC BLOOD PRESSURE: 170 MMHG | RESPIRATION RATE: 20 BRPM | HEIGHT: 70 IN | WEIGHT: 230 LBS | HEART RATE: 77 BPM | DIASTOLIC BLOOD PRESSURE: 97 MMHG | TEMPERATURE: 97.9 F

## 2019-01-25 VITALS — DIASTOLIC BLOOD PRESSURE: 100 MMHG | TEMPERATURE: 95.9 F | SYSTOLIC BLOOD PRESSURE: 157 MMHG | OXYGEN SATURATION: 98 %

## 2019-01-25 DIAGNOSIS — Z01.812 PRE-OPERATIVE LABORATORY EXAMINATION: Primary | ICD-10-CM

## 2019-01-25 DIAGNOSIS — R52 PAIN: ICD-10-CM

## 2019-01-25 DIAGNOSIS — N20.1 URETERAL CALCULUS, RIGHT: ICD-10-CM

## 2019-01-25 LAB — METER GLUCOSE: 113 MG/DL (ref 74–99)

## 2019-01-25 PROCEDURE — C2617 STENT, NON-COR, TEM W/O DEL: HCPCS | Performed by: UROLOGY

## 2019-01-25 PROCEDURE — 82962 GLUCOSE BLOOD TEST: CPT

## 2019-01-25 PROCEDURE — 2580000003 HC RX 258: Performed by: NURSE ANESTHETIST, CERTIFIED REGISTERED

## 2019-01-25 PROCEDURE — 6360000002 HC RX W HCPCS: Performed by: NURSE ANESTHETIST, CERTIFIED REGISTERED

## 2019-01-25 PROCEDURE — 6360000002 HC RX W HCPCS: Performed by: UROLOGY

## 2019-01-25 PROCEDURE — 74420 UROGRAPHY RTRGR +-KUB: CPT

## 2019-01-25 PROCEDURE — 3600000004 HC SURGERY LEVEL 4 BASE: Performed by: UROLOGY

## 2019-01-25 PROCEDURE — 3700000001 HC ADD 15 MINUTES (ANESTHESIA): Performed by: UROLOGY

## 2019-01-25 PROCEDURE — C1773 RET DEV, INSERTABLE: HCPCS | Performed by: UROLOGY

## 2019-01-25 PROCEDURE — 7100000010 HC PHASE II RECOVERY - FIRST 15 MIN: Performed by: UROLOGY

## 2019-01-25 PROCEDURE — 2720000010 HC SURG SUPPLY STERILE: Performed by: UROLOGY

## 2019-01-25 PROCEDURE — 3600000014 HC SURGERY LEVEL 4 ADDTL 15MIN: Performed by: UROLOGY

## 2019-01-25 PROCEDURE — 6360000002 HC RX W HCPCS: Performed by: ANESTHESIOLOGY

## 2019-01-25 PROCEDURE — 88300 SURGICAL PATH GROSS: CPT

## 2019-01-25 PROCEDURE — C1894 INTRO/SHEATH, NON-LASER: HCPCS | Performed by: UROLOGY

## 2019-01-25 PROCEDURE — 3700000000 HC ANESTHESIA ATTENDED CARE: Performed by: UROLOGY

## 2019-01-25 PROCEDURE — 7100000011 HC PHASE II RECOVERY - ADDTL 15 MIN: Performed by: UROLOGY

## 2019-01-25 PROCEDURE — 82365 CALCULUS SPECTROSCOPY: CPT

## 2019-01-25 PROCEDURE — 7100000000 HC PACU RECOVERY - FIRST 15 MIN: Performed by: UROLOGY

## 2019-01-25 PROCEDURE — 6370000000 HC RX 637 (ALT 250 FOR IP): Performed by: ANESTHESIOLOGY

## 2019-01-25 PROCEDURE — 87088 URINE BACTERIA CULTURE: CPT

## 2019-01-25 PROCEDURE — 2500000003 HC RX 250 WO HCPCS: Performed by: NURSE ANESTHETIST, CERTIFIED REGISTERED

## 2019-01-25 PROCEDURE — 7100000001 HC PACU RECOVERY - ADDTL 15 MIN: Performed by: UROLOGY

## 2019-01-25 PROCEDURE — 2709999900 HC NON-CHARGEABLE SUPPLY: Performed by: UROLOGY

## 2019-01-25 PROCEDURE — C1758 CATHETER, URETERAL: HCPCS | Performed by: UROLOGY

## 2019-01-25 DEVICE — UNIVERSA FIRM URETERAL STENT AND POSITIONER WITH HYDROPHILIC COATING
Type: IMPLANTABLE DEVICE | Site: URETER | Status: FUNCTIONAL
Brand: UNIVERSA

## 2019-01-25 RX ORDER — FENTANYL CITRATE 50 UG/ML
50 INJECTION, SOLUTION INTRAMUSCULAR; INTRAVENOUS EVERY 5 MIN PRN
Status: DISCONTINUED | OUTPATIENT
Start: 2019-01-25 | End: 2019-01-25 | Stop reason: HOSPADM

## 2019-01-25 RX ORDER — CIPROFLOXACIN 2 MG/ML
400 INJECTION, SOLUTION INTRAVENOUS
Status: COMPLETED | OUTPATIENT
Start: 2019-01-25 | End: 2019-01-25

## 2019-01-25 RX ORDER — PHENAZOPYRIDINE HYDROCHLORIDE 200 MG/1
200 TABLET, FILM COATED ORAL 3 TIMES DAILY PRN
Qty: 21 TABLET | Refills: 2 | Status: SHIPPED | OUTPATIENT
Start: 2019-01-25 | End: 2019-01-28

## 2019-01-25 RX ORDER — FENTANYL CITRATE 50 UG/ML
25 INJECTION, SOLUTION INTRAMUSCULAR; INTRAVENOUS EVERY 5 MIN PRN
Status: DISCONTINUED | OUTPATIENT
Start: 2019-01-25 | End: 2019-01-25 | Stop reason: HOSPADM

## 2019-01-25 RX ORDER — MORPHINE SULFATE 2 MG/ML
1 INJECTION, SOLUTION INTRAMUSCULAR; INTRAVENOUS EVERY 5 MIN PRN
Status: DISCONTINUED | OUTPATIENT
Start: 2019-01-25 | End: 2019-01-25 | Stop reason: HOSPADM

## 2019-01-25 RX ORDER — DEXAMETHASONE SODIUM PHOSPHATE 10 MG/ML
INJECTION, SOLUTION INTRAMUSCULAR; INTRAVENOUS PRN
Status: DISCONTINUED | OUTPATIENT
Start: 2019-01-25 | End: 2019-01-25 | Stop reason: SDUPTHER

## 2019-01-25 RX ORDER — SUCCINYLCHOLINE CHLORIDE 20 MG/ML
INJECTION INTRAMUSCULAR; INTRAVENOUS PRN
Status: DISCONTINUED | OUTPATIENT
Start: 2019-01-25 | End: 2019-01-25 | Stop reason: SDUPTHER

## 2019-01-25 RX ORDER — OXYCODONE HYDROCHLORIDE AND ACETAMINOPHEN 5; 325 MG/1; MG/1
1 TABLET ORAL EVERY 6 HOURS PRN
Status: DISCONTINUED | OUTPATIENT
Start: 2019-01-25 | End: 2019-01-25 | Stop reason: HOSPADM

## 2019-01-25 RX ORDER — MIDAZOLAM HYDROCHLORIDE 1 MG/ML
INJECTION INTRAMUSCULAR; INTRAVENOUS PRN
Status: DISCONTINUED | OUTPATIENT
Start: 2019-01-25 | End: 2019-01-25 | Stop reason: SDUPTHER

## 2019-01-25 RX ORDER — ONDANSETRON 2 MG/ML
INJECTION INTRAMUSCULAR; INTRAVENOUS PRN
Status: DISCONTINUED | OUTPATIENT
Start: 2019-01-25 | End: 2019-01-25 | Stop reason: SDUPTHER

## 2019-01-25 RX ORDER — OXYCODONE HYDROCHLORIDE AND ACETAMINOPHEN 5; 325 MG/1; MG/1
1 TABLET ORAL EVERY 4 HOURS PRN
Qty: 10 TABLET | Refills: 0 | Status: SHIPPED | OUTPATIENT
Start: 2019-01-25 | End: 2019-02-01

## 2019-01-25 RX ORDER — EPHEDRINE SULFATE 50 MG/ML
INJECTION INTRAVENOUS PRN
Status: DISCONTINUED | OUTPATIENT
Start: 2019-01-25 | End: 2019-01-25 | Stop reason: SDUPTHER

## 2019-01-25 RX ORDER — MORPHINE SULFATE 2 MG/ML
2 INJECTION, SOLUTION INTRAMUSCULAR; INTRAVENOUS EVERY 5 MIN PRN
Status: DISCONTINUED | OUTPATIENT
Start: 2019-01-25 | End: 2019-01-25 | Stop reason: HOSPADM

## 2019-01-25 RX ORDER — PROPOFOL 10 MG/ML
INJECTION, EMULSION INTRAVENOUS PRN
Status: DISCONTINUED | OUTPATIENT
Start: 2019-01-25 | End: 2019-01-25 | Stop reason: SDUPTHER

## 2019-01-25 RX ORDER — ONDANSETRON 2 MG/ML
4 INJECTION INTRAMUSCULAR; INTRAVENOUS EVERY 6 HOURS PRN
Status: DISCONTINUED | OUTPATIENT
Start: 2019-01-25 | End: 2019-01-25 | Stop reason: HOSPADM

## 2019-01-25 RX ORDER — PROPOFOL 10 MG/ML
INJECTION, EMULSION INTRAVENOUS CONTINUOUS PRN
Status: DISCONTINUED | OUTPATIENT
Start: 2019-01-25 | End: 2019-01-25

## 2019-01-25 RX ORDER — OXYCODONE HYDROCHLORIDE AND ACETAMINOPHEN 5; 325 MG/1; MG/1
1 TABLET ORAL
Status: COMPLETED | OUTPATIENT
Start: 2019-01-25 | End: 2019-01-25

## 2019-01-25 RX ORDER — ATROPA BELLADONNA AND OPIUM 16.2; 6 MG/1; MG/1
60 SUPPOSITORY RECTAL EVERY 8 HOURS PRN
Status: DISCONTINUED | OUTPATIENT
Start: 2019-01-25 | End: 2019-01-25 | Stop reason: HOSPADM

## 2019-01-25 RX ORDER — MORPHINE SULFATE 2 MG/ML
2 INJECTION, SOLUTION INTRAMUSCULAR; INTRAVENOUS EVERY 4 HOURS PRN
Status: DISCONTINUED | OUTPATIENT
Start: 2019-01-25 | End: 2019-01-25 | Stop reason: HOSPADM

## 2019-01-25 RX ORDER — FENTANYL CITRATE 50 UG/ML
INJECTION, SOLUTION INTRAMUSCULAR; INTRAVENOUS PRN
Status: DISCONTINUED | OUTPATIENT
Start: 2019-01-25 | End: 2019-01-25 | Stop reason: SDUPTHER

## 2019-01-25 RX ORDER — SODIUM CHLORIDE 0.9 % (FLUSH) 0.9 %
10 SYRINGE (ML) INJECTION EVERY 12 HOURS SCHEDULED
Status: DISCONTINUED | OUTPATIENT
Start: 2019-01-25 | End: 2019-01-25 | Stop reason: HOSPADM

## 2019-01-25 RX ORDER — ROCURONIUM BROMIDE 10 MG/ML
INJECTION, SOLUTION INTRAVENOUS PRN
Status: DISCONTINUED | OUTPATIENT
Start: 2019-01-25 | End: 2019-01-25 | Stop reason: SDUPTHER

## 2019-01-25 RX ORDER — MORPHINE SULFATE 2 MG/ML
INJECTION, SOLUTION INTRAMUSCULAR; INTRAVENOUS
Status: DISCONTINUED
Start: 2019-01-25 | End: 2019-01-25 | Stop reason: HOSPADM

## 2019-01-25 RX ORDER — SODIUM CHLORIDE 0.9 % (FLUSH) 0.9 %
10 SYRINGE (ML) INJECTION PRN
Status: DISCONTINUED | OUTPATIENT
Start: 2019-01-25 | End: 2019-01-25 | Stop reason: HOSPADM

## 2019-01-25 RX ORDER — SODIUM CHLORIDE 9 MG/ML
INJECTION, SOLUTION INTRAVENOUS CONTINUOUS PRN
Status: DISCONTINUED | OUTPATIENT
Start: 2019-01-25 | End: 2019-01-25 | Stop reason: SDUPTHER

## 2019-01-25 RX ADMIN — PROPOFOL 50 MG: 10 INJECTION, EMULSION INTRAVENOUS at 13:02

## 2019-01-25 RX ADMIN — CIPROFLOXACIN 400 MG: 2 INJECTION, SOLUTION INTRAVENOUS at 11:55

## 2019-01-25 RX ADMIN — DEXAMETHASONE SODIUM PHOSPHATE 10 MG: 10 INJECTION INTRAMUSCULAR; INTRAVENOUS at 12:09

## 2019-01-25 RX ADMIN — PHENYLEPHRINE HYDROCHLORIDE 100 MCG: 10 INJECTION INTRAVENOUS at 12:16

## 2019-01-25 RX ADMIN — SUCCINYLCHOLINE CHLORIDE 140 MG: 20 INJECTION, SOLUTION INTRAMUSCULAR; INTRAVENOUS at 12:00

## 2019-01-25 RX ADMIN — ROCURONIUM BROMIDE 10 MG: 10 INJECTION INTRAVENOUS at 12:00

## 2019-01-25 RX ADMIN — LIDOCAINE HYDROCHLORIDE 60 MG: 20 INJECTION, SOLUTION INTRAVENOUS at 12:00

## 2019-01-25 RX ADMIN — MORPHINE SULFATE 2 MG: 2 INJECTION, SOLUTION INTRAMUSCULAR; INTRAVENOUS at 13:35

## 2019-01-25 RX ADMIN — PROPOFOL 150 MG: 10 INJECTION, EMULSION INTRAVENOUS at 12:00

## 2019-01-25 RX ADMIN — PHENYLEPHRINE HYDROCHLORIDE 100 MCG: 10 INJECTION INTRAVENOUS at 12:11

## 2019-01-25 RX ADMIN — EPHEDRINE SULFATE 5 MG: 50 INJECTION, SOLUTION INTRAVENOUS at 12:32

## 2019-01-25 RX ADMIN — MIDAZOLAM HYDROCHLORIDE 2 MG: 1 INJECTION, SOLUTION INTRAMUSCULAR; INTRAVENOUS at 11:47

## 2019-01-25 RX ADMIN — PHENYLEPHRINE HYDROCHLORIDE 100 MCG: 10 INJECTION INTRAVENOUS at 12:41

## 2019-01-25 RX ADMIN — SODIUM CHLORIDE: 9 INJECTION, SOLUTION INTRAVENOUS at 11:47

## 2019-01-25 RX ADMIN — FENTANYL CITRATE 100 MCG: 50 INJECTION, SOLUTION INTRAMUSCULAR; INTRAVENOUS at 12:00

## 2019-01-25 RX ADMIN — OXYCODONE AND ACETAMINOPHEN 1 TABLET: 5; 325 TABLET ORAL at 14:36

## 2019-01-25 RX ADMIN — EPHEDRINE SULFATE 5 MG: 50 INJECTION, SOLUTION INTRAVENOUS at 12:27

## 2019-01-25 RX ADMIN — ONDANSETRON HYDROCHLORIDE 4 MG: 2 INJECTION, SOLUTION INTRAMUSCULAR; INTRAVENOUS at 12:54

## 2019-01-25 RX ADMIN — MORPHINE SULFATE 2 MG: 2 INJECTION, SOLUTION INTRAMUSCULAR; INTRAVENOUS at 13:40

## 2019-01-25 RX ADMIN — PHENYLEPHRINE HYDROCHLORIDE 100 MCG: 10 INJECTION INTRAVENOUS at 12:22

## 2019-01-25 ASSESSMENT — PULMONARY FUNCTION TESTS
PIF_VALUE: 18
PIF_VALUE: 17
PIF_VALUE: 19
PIF_VALUE: 1
PIF_VALUE: 4
PIF_VALUE: 12
PIF_VALUE: 17
PIF_VALUE: 19
PIF_VALUE: 2
PIF_VALUE: 6
PIF_VALUE: 19
PIF_VALUE: 15
PIF_VALUE: 1
PIF_VALUE: 1
PIF_VALUE: 17
PIF_VALUE: 13
PIF_VALUE: 22
PIF_VALUE: 19
PIF_VALUE: 2
PIF_VALUE: 19
PIF_VALUE: 2
PIF_VALUE: 2
PIF_VALUE: 19
PIF_VALUE: 7
PIF_VALUE: 1
PIF_VALUE: 17
PIF_VALUE: 18
PIF_VALUE: 12
PIF_VALUE: 1
PIF_VALUE: 17
PIF_VALUE: 20
PIF_VALUE: 19
PIF_VALUE: 1
PIF_VALUE: 20
PIF_VALUE: 2
PIF_VALUE: 19
PIF_VALUE: 18
PIF_VALUE: 12
PIF_VALUE: 19
PIF_VALUE: 12
PIF_VALUE: 19
PIF_VALUE: 15
PIF_VALUE: 12
PIF_VALUE: 7
PIF_VALUE: 36
PIF_VALUE: 1
PIF_VALUE: 18
PIF_VALUE: 5
PIF_VALUE: 17
PIF_VALUE: 6
PIF_VALUE: 5
PIF_VALUE: 18
PIF_VALUE: 17
PIF_VALUE: 17
PIF_VALUE: 18
PIF_VALUE: 1
PIF_VALUE: 19
PIF_VALUE: 20
PIF_VALUE: 19
PIF_VALUE: 1
PIF_VALUE: 15
PIF_VALUE: 1
PIF_VALUE: 19
PIF_VALUE: 17
PIF_VALUE: 0
PIF_VALUE: 1
PIF_VALUE: 19
PIF_VALUE: 19
PIF_VALUE: 1
PIF_VALUE: 1
PIF_VALUE: 17
PIF_VALUE: 12
PIF_VALUE: 12
PIF_VALUE: 18
PIF_VALUE: 2
PIF_VALUE: 19

## 2019-01-25 ASSESSMENT — PAIN - FUNCTIONAL ASSESSMENT
PAIN_FUNCTIONAL_ASSESSMENT: PREVENTS OR INTERFERES WITH ALL ACTIVE AND SOME PASSIVE ACTIVITIES
PAIN_FUNCTIONAL_ASSESSMENT: 0-10
PAIN_FUNCTIONAL_ASSESSMENT: PREVENTS OR INTERFERES SOME ACTIVE ACTIVITIES AND ADLS

## 2019-01-25 ASSESSMENT — PAIN SCALES - GENERAL
PAINLEVEL_OUTOF10: 5
PAINLEVEL_OUTOF10: 3
PAINLEVEL_OUTOF10: 3
PAINLEVEL_OUTOF10: 0
PAINLEVEL_OUTOF10: 6
PAINLEVEL_OUTOF10: 7
PAINLEVEL_OUTOF10: 7
PAINLEVEL_OUTOF10: 0

## 2019-01-25 ASSESSMENT — PAIN DESCRIPTION - PAIN TYPE
TYPE: SURGICAL PAIN

## 2019-01-25 ASSESSMENT — PAIN DESCRIPTION - FREQUENCY
FREQUENCY: CONTINUOUS
FREQUENCY: CONTINUOUS

## 2019-01-25 ASSESSMENT — PAIN DESCRIPTION - LOCATION
LOCATION: FLANK;PERINEUM
LOCATION: FLANK;PERINEUM
LOCATION: PERINEUM
LOCATION: PERINEUM;PENIS

## 2019-01-25 ASSESSMENT — PAIN DESCRIPTION - ORIENTATION
ORIENTATION: RIGHT
ORIENTATION: RIGHT

## 2019-01-25 ASSESSMENT — PAIN DESCRIPTION - DESCRIPTORS
DESCRIPTORS: ACHING;BURNING;CONSTANT

## 2019-01-27 LAB — URINE CULTURE, ROUTINE: NORMAL

## 2019-01-31 LAB
CALCULI COMPOSITION: NORMAL
MASS: 29 MG
STONE DESCRIPTION: NORMAL
STONE NUMBER: 2
STONE SIZE: NORMAL MM

## 2019-02-04 DIAGNOSIS — F41.1 GAD (GENERALIZED ANXIETY DISORDER): ICD-10-CM

## 2019-02-04 RX ORDER — BUPROPION HYDROCHLORIDE 150 MG/1
TABLET ORAL
Qty: 30 TABLET | Refills: 0 | Status: SHIPPED | OUTPATIENT
Start: 2019-02-04 | End: 2019-03-15 | Stop reason: SDUPTHER

## 2019-02-04 RX ORDER — LISINOPRIL 20 MG/1
20 TABLET ORAL DAILY
Qty: 30 TABLET | Refills: 0 | Status: SHIPPED | OUTPATIENT
Start: 2019-02-04 | End: 2019-03-15 | Stop reason: SDUPTHER

## 2019-03-15 ENCOUNTER — OFFICE VISIT (OUTPATIENT)
Dept: PRIMARY CARE CLINIC | Age: 59
End: 2019-03-15
Payer: MEDICARE

## 2019-03-15 ENCOUNTER — HOSPITAL ENCOUNTER (OUTPATIENT)
Age: 59
Discharge: HOME | End: 2019-03-17
Payer: MEDICARE

## 2019-03-15 VITALS
DIASTOLIC BLOOD PRESSURE: 84 MMHG | BODY MASS INDEX: 33.58 KG/M2 | WEIGHT: 234 LBS | HEART RATE: 66 BPM | SYSTOLIC BLOOD PRESSURE: 132 MMHG | TEMPERATURE: 95.9 F | OXYGEN SATURATION: 98 %

## 2019-03-15 DIAGNOSIS — I10 ESSENTIAL HYPERTENSION: Primary | ICD-10-CM

## 2019-03-15 DIAGNOSIS — F41.1 GAD (GENERALIZED ANXIETY DISORDER): ICD-10-CM

## 2019-03-15 DIAGNOSIS — E78.5 HYPERLIPIDEMIA, UNSPECIFIED HYPERLIPIDEMIA TYPE: ICD-10-CM

## 2019-03-15 DIAGNOSIS — I10 ESSENTIAL HYPERTENSION: ICD-10-CM

## 2019-03-15 DIAGNOSIS — E11.9 TYPE 2 DIABETES MELLITUS WITHOUT COMPLICATION, WITHOUT LONG-TERM CURRENT USE OF INSULIN (HCC): ICD-10-CM

## 2019-03-15 LAB
ALBUMIN SERPL-MCNC: 4.3 G/DL (ref 3.5–5.2)
ALP BLD-CCNC: 92 U/L (ref 40–129)
ALT SERPL-CCNC: 28 U/L (ref 0–40)
ANION GAP SERPL CALCULATED.3IONS-SCNC: 15 MMOL/L (ref 7–16)
AST SERPL-CCNC: 22 U/L (ref 0–39)
BILIRUB SERPL-MCNC: 0.6 MG/DL (ref 0–1.2)
BUN BLDV-MCNC: 16 MG/DL (ref 8–23)
CALCIUM SERPL-MCNC: 9 MG/DL (ref 8.6–10.2)
CHLORIDE BLD-SCNC: 99 MMOL/L (ref 98–107)
CHOLESTEROL, TOTAL: 161 MG/DL (ref 0–199)
CO2: 24 MMOL/L (ref 22–29)
CREAT SERPL-MCNC: 0.9 MG/DL (ref 0.7–1.2)
GFR AFRICAN AMERICAN: >60
GFR NON-AFRICAN AMERICAN: >60 ML/MIN/1.73
GLUCOSE BLD-MCNC: 115 MG/DL (ref 74–99)
HBA1C MFR BLD: 6.5 % (ref 4–5.6)
HDLC SERPL-MCNC: 36 MG/DL
LDL CHOLESTEROL CALCULATED: 61 MG/DL (ref 0–99)
POTASSIUM SERPL-SCNC: 4.3 MMOL/L (ref 3.5–5)
SODIUM BLD-SCNC: 138 MMOL/L (ref 132–146)
TOTAL PROTEIN: 6.4 G/DL (ref 6.4–8.3)
TRIGL SERPL-MCNC: 322 MG/DL (ref 0–149)
VLDLC SERPL CALC-MCNC: 64 MG/DL

## 2019-03-15 PROCEDURE — 1036F TOBACCO NON-USER: CPT | Performed by: FAMILY MEDICINE

## 2019-03-15 PROCEDURE — G8484 FLU IMMUNIZE NO ADMIN: HCPCS | Performed by: FAMILY MEDICINE

## 2019-03-15 PROCEDURE — 80053 COMPREHEN METABOLIC PANEL: CPT

## 2019-03-15 PROCEDURE — 99214 OFFICE O/P EST MOD 30 MIN: CPT | Performed by: FAMILY MEDICINE

## 2019-03-15 PROCEDURE — 80061 LIPID PANEL: CPT

## 2019-03-15 PROCEDURE — G8427 DOCREV CUR MEDS BY ELIG CLIN: HCPCS | Performed by: FAMILY MEDICINE

## 2019-03-15 PROCEDURE — 3046F HEMOGLOBIN A1C LEVEL >9.0%: CPT | Performed by: FAMILY MEDICINE

## 2019-03-15 PROCEDURE — 83036 HEMOGLOBIN GLYCOSYLATED A1C: CPT

## 2019-03-15 PROCEDURE — G8417 CALC BMI ABV UP PARAM F/U: HCPCS | Performed by: FAMILY MEDICINE

## 2019-03-15 PROCEDURE — 2022F DILAT RTA XM EVC RTNOPTHY: CPT | Performed by: FAMILY MEDICINE

## 2019-03-15 PROCEDURE — 3017F COLORECTAL CA SCREEN DOC REV: CPT | Performed by: FAMILY MEDICINE

## 2019-03-15 RX ORDER — BUPROPION HYDROCHLORIDE 150 MG/1
TABLET ORAL
Qty: 90 TABLET | Refills: 1 | Status: SHIPPED | OUTPATIENT
Start: 2019-03-15 | End: 2019-04-24 | Stop reason: SDUPTHER

## 2019-03-15 RX ORDER — LISINOPRIL 20 MG/1
20 TABLET ORAL DAILY
Qty: 90 TABLET | Refills: 1 | Status: SHIPPED | OUTPATIENT
Start: 2019-03-15 | End: 2019-04-24 | Stop reason: SDUPTHER

## 2019-03-15 RX ORDER — ATORVASTATIN CALCIUM 10 MG/1
10 TABLET, FILM COATED ORAL NIGHTLY
Qty: 90 TABLET | Refills: 1 | Status: SHIPPED | OUTPATIENT
Start: 2019-03-15 | End: 2019-04-24 | Stop reason: SDUPTHER

## 2019-03-15 ASSESSMENT — ENCOUNTER SYMPTOMS
DIARRHEA: 0
SHORTNESS OF BREATH: 0
BLOOD IN STOOL: 0
VISUAL CHANGE: 0
BLURRED VISION: 0
CONSTIPATION: 0

## 2019-03-15 ASSESSMENT — PATIENT HEALTH QUESTIONNAIRE - PHQ9
1. LITTLE INTEREST OR PLEASURE IN DOING THINGS: 0
2. FEELING DOWN, DEPRESSED OR HOPELESS: 0
SUM OF ALL RESPONSES TO PHQ QUESTIONS 1-9: 0
SUM OF ALL RESPONSES TO PHQ QUESTIONS 1-9: 0
SUM OF ALL RESPONSES TO PHQ9 QUESTIONS 1 & 2: 0

## 2019-04-24 DIAGNOSIS — E78.5 HYPERLIPIDEMIA, UNSPECIFIED HYPERLIPIDEMIA TYPE: ICD-10-CM

## 2019-04-24 DIAGNOSIS — I10 ESSENTIAL HYPERTENSION: ICD-10-CM

## 2019-04-24 DIAGNOSIS — F41.1 GAD (GENERALIZED ANXIETY DISORDER): ICD-10-CM

## 2019-04-24 RX ORDER — LISINOPRIL 20 MG/1
20 TABLET ORAL DAILY
Qty: 90 TABLET | Refills: 1 | Status: SHIPPED | OUTPATIENT
Start: 2019-04-24 | End: 2019-11-25 | Stop reason: SDUPTHER

## 2019-04-24 RX ORDER — ATORVASTATIN CALCIUM 10 MG/1
10 TABLET, FILM COATED ORAL NIGHTLY
Qty: 90 TABLET | Refills: 1 | Status: SHIPPED | OUTPATIENT
Start: 2019-04-24 | End: 2019-11-25 | Stop reason: SDUPTHER

## 2019-04-24 RX ORDER — BUPROPION HYDROCHLORIDE 150 MG/1
TABLET ORAL
Qty: 90 TABLET | Refills: 1 | Status: SHIPPED | OUTPATIENT
Start: 2019-04-24 | End: 2019-11-25 | Stop reason: SDUPTHER

## 2019-07-29 ENCOUNTER — NURSE ONLY (OUTPATIENT)
Dept: NON INVASIVE DIAGNOSTICS | Age: 59
End: 2019-07-29
Payer: MEDICARE

## 2019-07-29 DIAGNOSIS — I47.20 VT (VENTRICULAR TACHYCARDIA): ICD-10-CM

## 2019-07-29 DIAGNOSIS — Z95.810 DUAL IMPLANTABLE CARDIOVERTER-DEFIBRILLATOR IN SITU: Primary | ICD-10-CM

## 2019-07-29 PROCEDURE — 93295 DEV INTERROG REMOTE 1/2/MLT: CPT | Performed by: INTERNAL MEDICINE

## 2019-07-29 PROCEDURE — 93296 REM INTERROG EVL PM/IDS: CPT | Performed by: INTERNAL MEDICINE

## 2019-08-05 ENCOUNTER — TELEPHONE (OUTPATIENT)
Dept: NON INVASIVE DIAGNOSTICS | Age: 59
End: 2019-08-05

## 2019-08-05 NOTE — TELEPHONE ENCOUNTER
----- Message from Jaspal Cavanaugh RN sent at 8/5/2019 12:05 PM EDT -----  Successful transmission received. Please call patient and give next appointment.

## 2019-08-23 RX ORDER — INDOMETHACIN 50 MG/1
50 CAPSULE ORAL 3 TIMES DAILY PRN
Qty: 30 CAPSULE | Refills: 0 | Status: SHIPPED | OUTPATIENT
Start: 2019-08-23 | End: 2019-09-16 | Stop reason: SDUPTHER

## 2019-09-04 ENCOUNTER — OFFICE VISIT (OUTPATIENT)
Dept: PRIMARY CARE CLINIC | Age: 59
End: 2019-09-04
Payer: MEDICARE

## 2019-09-04 VITALS
OXYGEN SATURATION: 98 % | TEMPERATURE: 98 F | DIASTOLIC BLOOD PRESSURE: 88 MMHG | HEART RATE: 72 BPM | BODY MASS INDEX: 32.5 KG/M2 | HEIGHT: 70 IN | SYSTOLIC BLOOD PRESSURE: 136 MMHG | WEIGHT: 227 LBS

## 2019-09-04 DIAGNOSIS — E78.5 HYPERLIPIDEMIA, UNSPECIFIED HYPERLIPIDEMIA TYPE: ICD-10-CM

## 2019-09-04 DIAGNOSIS — Z12.5 SCREENING PSA (PROSTATE SPECIFIC ANTIGEN): ICD-10-CM

## 2019-09-04 DIAGNOSIS — M10.061 ACUTE IDIOPATHIC GOUT OF RIGHT KNEE: ICD-10-CM

## 2019-09-04 DIAGNOSIS — I10 ESSENTIAL HYPERTENSION: Primary | ICD-10-CM

## 2019-09-04 DIAGNOSIS — E11.9 TYPE 2 DIABETES MELLITUS WITHOUT COMPLICATION, WITHOUT LONG-TERM CURRENT USE OF INSULIN (HCC): ICD-10-CM

## 2019-09-04 PROCEDURE — 3044F HG A1C LEVEL LT 7.0%: CPT | Performed by: FAMILY MEDICINE

## 2019-09-04 PROCEDURE — G8417 CALC BMI ABV UP PARAM F/U: HCPCS | Performed by: FAMILY MEDICINE

## 2019-09-04 PROCEDURE — 1036F TOBACCO NON-USER: CPT | Performed by: FAMILY MEDICINE

## 2019-09-04 PROCEDURE — 3017F COLORECTAL CA SCREEN DOC REV: CPT | Performed by: FAMILY MEDICINE

## 2019-09-04 PROCEDURE — G8427 DOCREV CUR MEDS BY ELIG CLIN: HCPCS | Performed by: FAMILY MEDICINE

## 2019-09-04 PROCEDURE — 99214 OFFICE O/P EST MOD 30 MIN: CPT | Performed by: FAMILY MEDICINE

## 2019-09-04 PROCEDURE — 2022F DILAT RTA XM EVC RTNOPTHY: CPT | Performed by: FAMILY MEDICINE

## 2019-09-04 ASSESSMENT — ENCOUNTER SYMPTOMS
VISUAL CHANGE: 0
SHORTNESS OF BREATH: 0

## 2019-09-04 NOTE — PROGRESS NOTES
 aspirin 81 MG tablet Take 81 mg by mouth daily      TURMERIC PO Take by mouth nightly       scopolamine (TRANSDERM-SCOP, 1.5 MG,) transdermal patch Place 1 patch onto the skin every 72 hours (Patient not taking: Reported on 9/4/2019) 4 patch 0     No current facility-administered medications on file prior to visit. Review of Systems   Respiratory: Negative for shortness of breath. Cardiovascular: Negative for chest pain and palpitations. Endocrine: Negative for polydipsia and polyuria. Musculoskeletal: Positive for gait problem. Negative for myalgias. Neurological: Negative for headaches. other review of systems reviewed and are negative    OBJECTIVE:  /88   Pulse 72   Temp 98 °F (36.7 °C)   Ht 5' 10\" (1.778 m)   Wt 227 lb (103 kg)   SpO2 98%   BMI 32.57 kg/m²      Physical Exam   Constitutional: He is oriented to person, place, and time. He appears well-nourished. Eyes: Conjunctivae are normal. No scleral icterus. Neck: Neck supple. Carotid bruit is not present. No thyromegaly present. Cardiovascular: Normal rate and regular rhythm. No murmur heard. Pulmonary/Chest: Effort normal and breath sounds normal. He has no wheezes. He has no rales. Abdominal: Soft. Bowel sounds are normal. He exhibits no mass. There is no tenderness. There is no rebound and no guarding. Musculoskeletal: Normal range of motion. He exhibits no edema. Right knee: He exhibits normal range of motion, no swelling, no effusion, no ecchymosis, no deformity, no erythema, no LCL laxity, normal patellar mobility and no MCL laxity. Tenderness (superior part of patella.) found. Pain with extension against resistance. Lymphadenopathy:     He has no cervical adenopathy. Neurological: He is alert and oriented to person, place, and time. Skin: Skin is warm and dry. Psychiatric: He has a normal mood and affect. Vitals reviewed.       ASSESSMENT AND PLAN:    Jessa Ontiveros was seen today for

## 2019-09-06 ENCOUNTER — HOSPITAL ENCOUNTER (OUTPATIENT)
Age: 59
Discharge: HOME | End: 2019-09-08
Payer: MEDICARE

## 2019-09-06 ENCOUNTER — NURSE ONLY (OUTPATIENT)
Dept: PRIMARY CARE CLINIC | Age: 59
End: 2019-09-06

## 2019-09-06 DIAGNOSIS — E11.9 TYPE 2 DIABETES MELLITUS WITHOUT COMPLICATION, WITHOUT LONG-TERM CURRENT USE OF INSULIN (HCC): ICD-10-CM

## 2019-09-06 DIAGNOSIS — E78.5 HYPERLIPIDEMIA, UNSPECIFIED HYPERLIPIDEMIA TYPE: ICD-10-CM

## 2019-09-06 DIAGNOSIS — M10.061 ACUTE IDIOPATHIC GOUT OF RIGHT KNEE: ICD-10-CM

## 2019-09-06 DIAGNOSIS — I10 ESSENTIAL HYPERTENSION: ICD-10-CM

## 2019-09-06 DIAGNOSIS — Z12.5 SCREENING PSA (PROSTATE SPECIFIC ANTIGEN): ICD-10-CM

## 2019-09-06 LAB
ALBUMIN SERPL-MCNC: 4.6 G/DL (ref 3.5–5.2)
ALP BLD-CCNC: 101 U/L (ref 40–129)
ALT SERPL-CCNC: 26 U/L (ref 0–40)
ANION GAP SERPL CALCULATED.3IONS-SCNC: 11 MMOL/L (ref 7–16)
AST SERPL-CCNC: 29 U/L (ref 0–39)
BILIRUB SERPL-MCNC: 0.6 MG/DL (ref 0–1.2)
BUN BLDV-MCNC: 14 MG/DL (ref 8–23)
CALCIUM SERPL-MCNC: 9.5 MG/DL (ref 8.6–10.2)
CHLORIDE BLD-SCNC: 100 MMOL/L (ref 98–107)
CHOLESTEROL, TOTAL: 156 MG/DL (ref 0–199)
CO2: 28 MMOL/L (ref 22–29)
CREAT SERPL-MCNC: 1 MG/DL (ref 0.7–1.2)
CREATININE URINE: 113 MG/DL (ref 40–278)
GFR AFRICAN AMERICAN: >60
GFR NON-AFRICAN AMERICAN: >60 ML/MIN/1.73
GLUCOSE BLD-MCNC: 118 MG/DL (ref 74–99)
HBA1C MFR BLD: 6.3 % (ref 4–5.6)
HDLC SERPL-MCNC: 43 MG/DL
LDL CHOLESTEROL CALCULATED: 79 MG/DL (ref 0–99)
MICROALBUMIN UR-MCNC: <12 MG/L
MICROALBUMIN/CREAT UR-RTO: ABNORMAL (ref 0–30)
POTASSIUM SERPL-SCNC: 4.4 MMOL/L (ref 3.5–5)
PROSTATE SPECIFIC ANTIGEN: 0.76 NG/ML (ref 0–4)
SODIUM BLD-SCNC: 139 MMOL/L (ref 132–146)
TOTAL PROTEIN: 7.2 G/DL (ref 6.4–8.3)
TRIGL SERPL-MCNC: 168 MG/DL (ref 0–149)
URIC ACID, SERUM: 7.5 MG/DL (ref 3.4–7)
VLDLC SERPL CALC-MCNC: 34 MG/DL

## 2019-09-06 PROCEDURE — 83036 HEMOGLOBIN GLYCOSYLATED A1C: CPT

## 2019-09-06 PROCEDURE — 80061 LIPID PANEL: CPT

## 2019-09-06 PROCEDURE — 82570 ASSAY OF URINE CREATININE: CPT

## 2019-09-06 PROCEDURE — 82044 UR ALBUMIN SEMIQUANTITATIVE: CPT

## 2019-09-06 PROCEDURE — 84550 ASSAY OF BLOOD/URIC ACID: CPT

## 2019-09-06 PROCEDURE — G0103 PSA SCREENING: HCPCS

## 2019-09-06 PROCEDURE — 80053 COMPREHEN METABOLIC PANEL: CPT

## 2019-09-10 ENCOUNTER — TELEPHONE (OUTPATIENT)
Dept: FAMILY MEDICINE CLINIC | Age: 59
End: 2019-09-10

## 2019-09-13 DIAGNOSIS — M10.061 ACUTE IDIOPATHIC GOUT OF RIGHT KNEE: ICD-10-CM

## 2019-09-16 ENCOUNTER — TELEPHONE (OUTPATIENT)
Dept: PRIMARY CARE CLINIC | Age: 59
End: 2019-09-16

## 2019-09-16 RX ORDER — INDOMETHACIN 50 MG/1
CAPSULE ORAL
Qty: 30 CAPSULE | Refills: 0 | Status: SHIPPED
Start: 2019-09-16 | End: 2020-02-14 | Stop reason: SINTOL

## 2019-09-16 RX ORDER — INDOMETHACIN 50 MG/1
50 CAPSULE ORAL 3 TIMES DAILY PRN
Qty: 30 CAPSULE | Refills: 1 | OUTPATIENT
Start: 2019-09-16

## 2019-10-30 ENCOUNTER — NURSE ONLY (OUTPATIENT)
Dept: NON INVASIVE DIAGNOSTICS | Age: 59
End: 2019-10-30
Payer: MEDICARE

## 2019-10-30 DIAGNOSIS — Z95.810 DUAL IMPLANTABLE CARDIOVERTER-DEFIBRILLATOR IN SITU: Primary | ICD-10-CM

## 2019-10-30 DIAGNOSIS — I47.20 VT (VENTRICULAR TACHYCARDIA): ICD-10-CM

## 2019-10-30 PROCEDURE — 93295 DEV INTERROG REMOTE 1/2/MLT: CPT | Performed by: INTERNAL MEDICINE

## 2019-10-30 PROCEDURE — 93296 REM INTERROG EVL PM/IDS: CPT | Performed by: INTERNAL MEDICINE

## 2019-11-08 ENCOUNTER — TELEPHONE (OUTPATIENT)
Dept: NON INVASIVE DIAGNOSTICS | Age: 59
End: 2019-11-08

## 2020-01-27 ENCOUNTER — TELEPHONE (OUTPATIENT)
Dept: ADMINISTRATIVE | Age: 60
End: 2020-01-27

## 2020-01-28 NOTE — TELEPHONE ENCOUNTER
Spoke with patient last evening. He is having pain behind his knee and his ankle. I am not sure if this is gout. I find it unlikely since he does not complain of redness warmness or swelling to these areas. I told him to follow-up for an office visit anyways as he is due for his diabetes checkup as well.

## 2020-01-29 ENCOUNTER — NURSE ONLY (OUTPATIENT)
Dept: NON INVASIVE DIAGNOSTICS | Age: 60
End: 2020-01-29
Payer: MEDICARE

## 2020-01-29 PROCEDURE — 93296 REM INTERROG EVL PM/IDS: CPT | Performed by: INTERNAL MEDICINE

## 2020-01-29 PROCEDURE — 93295 DEV INTERROG REMOTE 1/2/MLT: CPT | Performed by: INTERNAL MEDICINE

## 2020-01-30 ENCOUNTER — HOSPITAL ENCOUNTER (OUTPATIENT)
Age: 60
Discharge: HOME | End: 2020-02-01
Payer: MEDICARE

## 2020-01-30 ENCOUNTER — OFFICE VISIT (OUTPATIENT)
Dept: PRIMARY CARE CLINIC | Age: 60
End: 2020-01-30
Payer: MEDICARE

## 2020-01-30 VITALS
DIASTOLIC BLOOD PRESSURE: 88 MMHG | TEMPERATURE: 97.2 F | SYSTOLIC BLOOD PRESSURE: 138 MMHG | HEIGHT: 70 IN | HEART RATE: 60 BPM | WEIGHT: 231 LBS | BODY MASS INDEX: 33.07 KG/M2 | OXYGEN SATURATION: 96 %

## 2020-01-30 PROBLEM — K21.00 GASTROESOPHAGEAL REFLUX DISEASE WITH ESOPHAGITIS: Status: ACTIVE | Noted: 2020-01-30

## 2020-01-30 LAB
ALBUMIN SERPL-MCNC: 4.7 G/DL (ref 3.5–5.2)
ALP BLD-CCNC: 90 U/L (ref 40–129)
ALT SERPL-CCNC: 36 U/L (ref 0–40)
ANION GAP SERPL CALCULATED.3IONS-SCNC: 15 MMOL/L (ref 7–16)
AST SERPL-CCNC: 31 U/L (ref 0–39)
BILIRUB SERPL-MCNC: 1 MG/DL (ref 0–1.2)
BUN BLDV-MCNC: 19 MG/DL (ref 8–23)
CALCIUM SERPL-MCNC: 9.5 MG/DL (ref 8.6–10.2)
CHLORIDE BLD-SCNC: 98 MMOL/L (ref 98–107)
CHOLESTEROL, TOTAL: 142 MG/DL (ref 0–199)
CO2: 23 MMOL/L (ref 22–29)
CREAT SERPL-MCNC: 1 MG/DL (ref 0.7–1.2)
GFR AFRICAN AMERICAN: >60
GFR NON-AFRICAN AMERICAN: >60 ML/MIN/1.73
GLUCOSE BLD-MCNC: 114 MG/DL (ref 74–99)
HBA1C MFR BLD: 6.4 % (ref 4–5.6)
HDLC SERPL-MCNC: 47 MG/DL
LDL CHOLESTEROL CALCULATED: 57 MG/DL (ref 0–99)
POTASSIUM SERPL-SCNC: 4.4 MMOL/L (ref 3.5–5)
SODIUM BLD-SCNC: 136 MMOL/L (ref 132–146)
TOTAL PROTEIN: 7.3 G/DL (ref 6.4–8.3)
TRIGL SERPL-MCNC: 189 MG/DL (ref 0–149)
VLDLC SERPL CALC-MCNC: 38 MG/DL

## 2020-01-30 PROCEDURE — 1036F TOBACCO NON-USER: CPT | Performed by: FAMILY MEDICINE

## 2020-01-30 PROCEDURE — 83036 HEMOGLOBIN GLYCOSYLATED A1C: CPT

## 2020-01-30 PROCEDURE — 2022F DILAT RTA XM EVC RTNOPTHY: CPT | Performed by: FAMILY MEDICINE

## 2020-01-30 PROCEDURE — G8484 FLU IMMUNIZE NO ADMIN: HCPCS | Performed by: FAMILY MEDICINE

## 2020-01-30 PROCEDURE — 80061 LIPID PANEL: CPT

## 2020-01-30 PROCEDURE — 82164 ANGIOTENSIN I ENZYME TEST: CPT

## 2020-01-30 PROCEDURE — 80053 COMPREHEN METABOLIC PANEL: CPT

## 2020-01-30 PROCEDURE — G8427 DOCREV CUR MEDS BY ELIG CLIN: HCPCS | Performed by: FAMILY MEDICINE

## 2020-01-30 PROCEDURE — 3046F HEMOGLOBIN A1C LEVEL >9.0%: CPT | Performed by: FAMILY MEDICINE

## 2020-01-30 PROCEDURE — G8417 CALC BMI ABV UP PARAM F/U: HCPCS | Performed by: FAMILY MEDICINE

## 2020-01-30 PROCEDURE — 3017F COLORECTAL CA SCREEN DOC REV: CPT | Performed by: FAMILY MEDICINE

## 2020-01-30 PROCEDURE — 99214 OFFICE O/P EST MOD 30 MIN: CPT | Performed by: FAMILY MEDICINE

## 2020-01-30 RX ORDER — GABAPENTIN 100 MG/1
100 CAPSULE ORAL NIGHTLY
Qty: 30 CAPSULE | Refills: 5 | Status: SHIPPED
Start: 2020-01-30 | End: 2020-11-30 | Stop reason: ALTCHOICE

## 2020-01-30 RX ORDER — PANTOPRAZOLE SODIUM 20 MG/1
20 TABLET, DELAYED RELEASE ORAL
Qty: 30 TABLET | Refills: 5 | Status: SHIPPED
Start: 2020-01-30 | End: 2020-03-19 | Stop reason: SDUPTHER

## 2020-01-30 ASSESSMENT — PATIENT HEALTH QUESTIONNAIRE - PHQ9
SUM OF ALL RESPONSES TO PHQ QUESTIONS 1-9: 0
2. FEELING DOWN, DEPRESSED OR HOPELESS: 0
SUM OF ALL RESPONSES TO PHQ QUESTIONS 1-9: 0
SUM OF ALL RESPONSES TO PHQ9 QUESTIONS 1 & 2: 0
1. LITTLE INTEREST OR PLEASURE IN DOING THINGS: 0

## 2020-01-30 ASSESSMENT — ENCOUNTER SYMPTOMS
BLURRED VISION: 0
VISUAL CHANGE: 0

## 2020-01-30 NOTE — PROGRESS NOTES
Alex Martell, a male of 56 y.o. came to the office 1/30/2020. Patient Active Problem List   Diagnosis    PAC (premature atrial contraction)    Sarcoidosis    Syncope    HTN (hypertension)    Dual implantable cardioverter-defibrillator in situ    Hyperlipemia    Degenerative disc disease, cervical    Ureteral calculus, right    Gastroesophageal reflux disease with esophagitis          Leg Pain    There was no injury mechanism (pain for 10 days. ). The pain is present in the left knee and left ankle. Quality: sharp pain when goes to bend knee with squatting and to go and sit down. Pertinent negatives include no inability to bear weight, loss of motion or numbness. Treatments tried: tart cherry juice. The treatment provided no relief. Diabetes   He presents for his follow-up diabetic visit. He has type 2 diabetes mellitus. Associated symptoms include foot paresthesias (at night.). Pertinent negatives for diabetes include no blurred vision, no chest pain, no fatigue, no foot ulcerations, no polydipsia, no polyuria and no visual change. Current diabetic treatment includes diet. His weight is stable. He is following a generally healthy diet. He participates in exercise three times a week. An ACE inhibitor/angiotensin II receptor blocker is being taken. gerd: nexium not covered needs new ppi. Needs daily or gets heartburn. Allergies   Allergen Reactions    Alcohol Other (See Comments)     Rum causes stomach pain and cramping.           Current Outpatient Medications on File Prior to Visit   Medication Sig Dispense Refill    NIACIN PO Take 2 capsules by mouth daily      buPROPion (WELLBUTRIN XL) 150 MG extended release tablet TAKE ONE TABLET BY MOUTH EVERY MORNING 90 tablet 0    lisinopril (PRINIVIL;ZESTRIL) 20 MG tablet Take 1 tablet by mouth daily 90 tablet 0    esomeprazole (NEXIUM) 20 MG delayed release capsule Take 1 capsule by mouth nightly 90 capsule 0    atorvastatin (LIPITOR) 10 MG tablet Take 1 tablet by mouth nightly 90 tablet 0    scopolamine (TRANSDERM-SCOP, 1.5 MG,) transdermal patch Place 1 patch onto the skin every 72 hours 4 patch 0    aspirin 81 MG tablet Take 81 mg by mouth daily      TURMERIC PO Take by mouth nightly       indomethacin (INDOCIN) 50 MG capsule TAKE ONE CAPSULE BY MOUTH THREE TIMES A DAY AS NEEDED FOR PAIN (Patient not taking: Reported on 1/30/2020) 30 capsule 0     No current facility-administered medications on file prior to visit. Review of Systems   Constitutional: Negative for fatigue. Eyes: Negative for blurred vision. Cardiovascular: Negative for chest pain. Endocrine: Negative for polydipsia and polyuria. Neurological: Negative for numbness. other review of systems reviewed and are negative    OBJECTIVE:  /88   Pulse 60   Temp 97.2 °F (36.2 °C)   Ht 5' 10\" (1.778 m)   Wt 231 lb (104.8 kg)   SpO2 96%   BMI 33.15 kg/m²      Physical Exam  Vitals signs reviewed. Eyes:      General: No scleral icterus. Conjunctiva/sclera: Conjunctivae normal.   Neck:      Musculoskeletal: Neck supple. Thyroid: No thyromegaly. Vascular: No carotid bruit. Cardiovascular:      Rate and Rhythm: Normal rate and regular rhythm. Heart sounds: No murmur. Pulmonary:      Effort: Pulmonary effort is normal.      Breath sounds: Normal breath sounds. No wheezing or rales. Abdominal:      General: Bowel sounds are normal.      Palpations: Abdomen is soft. There is no mass. Tenderness: There is no abdominal tenderness. There is no guarding or rebound. Musculoskeletal: Normal range of motion. Lymphadenopathy:      Cervical: No cervical adenopathy. Skin:     General: Skin is warm and dry. Neurological:      Mental Status: He is alert and oriented to person, place, and time.    Psychiatric:         Mood and Affect: Mood normal.     Left Ankle Exam   Left ankle exam is normal.  Swelling: none    Range of Motion   Dorsiflexion: normal   Plantar flexion: normal   Eversion: normal   Inversion: normal     Muscle Strength   The patient has normal left ankle strength. Left Knee Exam     Muscle Strength   The patient has normal left knee strength. Tenderness   The patient is experiencing no tenderness. Range of Motion   The patient has normal left knee ROM. Tests   Ivan:  Medial - negative Lateral - negative  Varus: negative Valgus: negative  Lachman:  Anterior - negative    Posterior - negative  Drawer:  Anterior - negative     Posterior - negative  Patellar apprehension: negative    Comments:  Pain in posterior knee with Ivan maneuver. ASSESSMENT AND PLAN:    Eunice Odom was seen today for medication adjustment, leg pain and blood work. Diagnoses and all orders for this visit:    Type 2 diabetes mellitus without complication, without long-term current use of insulin ([facility] [address]  -     Lipid Panel; Future  -     Comprehensive Metabolic Panel; Future  -     Hemoglobin A1C; Future    Acute pain of left knee  -     XR KNEE LEFT (3 VIEWS); Future    Acute left ankle pain    Gastroesophageal reflux disease with esophagitis  -     pantoprazole (PROTONIX) 20 MG tablet; Take 1 tablet by mouth every morning (before breakfast)    Sarcoidosis  -     ANGIOTENSIN CONVERTING ENZYME; Future    Other orders  -     gabapentin (NEURONTIN) 100 MG capsule; Take 1 capsule by mouth nightly for 180 days. Intended supply: 30 days    - continue diet and exericise  - new ppi ordered  - tylenol prn pain   - consider stopping aspirin if ok with cardiology. Return in about 6 months (around 7/30/2020) for for medicare pe or for acute problem, based on results.     Aneudy Berger, DO

## 2020-02-01 LAB — ANGIOTENSIN CONVERTING ENZYME: <5 U/L (ref 9–67)

## 2020-02-05 NOTE — PROGRESS NOTES
See [employer] [place] report. Remote monitoring reviewed over a 90 day period. End of 90 day monitoring period date of service 1.29.2020.

## 2020-02-10 ENCOUNTER — TELEPHONE (OUTPATIENT)
Dept: PRIMARY CARE CLINIC | Age: 60
End: 2020-02-10

## 2020-02-14 ENCOUNTER — OFFICE VISIT (OUTPATIENT)
Dept: NON INVASIVE DIAGNOSTICS | Age: 60
End: 2020-02-14
Payer: MEDICARE

## 2020-02-14 VITALS
WEIGHT: 230 LBS | BODY MASS INDEX: 32.93 KG/M2 | RESPIRATION RATE: 16 BRPM | HEIGHT: 70 IN | SYSTOLIC BLOOD PRESSURE: 120 MMHG | DIASTOLIC BLOOD PRESSURE: 78 MMHG | HEART RATE: 64 BPM

## 2020-02-14 PROCEDURE — G8484 FLU IMMUNIZE NO ADMIN: HCPCS | Performed by: NURSE PRACTITIONER

## 2020-02-14 PROCEDURE — G8427 DOCREV CUR MEDS BY ELIG CLIN: HCPCS | Performed by: NURSE PRACTITIONER

## 2020-02-14 PROCEDURE — 93283 PRGRMG EVAL IMPLANTABLE DFB: CPT | Performed by: NURSE PRACTITIONER

## 2020-02-14 PROCEDURE — 3017F COLORECTAL CA SCREEN DOC REV: CPT | Performed by: NURSE PRACTITIONER

## 2020-02-14 PROCEDURE — G8417 CALC BMI ABV UP PARAM F/U: HCPCS | Performed by: NURSE PRACTITIONER

## 2020-02-14 PROCEDURE — 99214 OFFICE O/P EST MOD 30 MIN: CPT | Performed by: NURSE PRACTITIONER

## 2020-02-14 PROCEDURE — 1036F TOBACCO NON-USER: CPT | Performed by: NURSE PRACTITIONER

## 2020-02-14 ASSESSMENT — ENCOUNTER SYMPTOMS: SHORTNESS OF BREATH: 1

## 2020-02-14 NOTE — PROGRESS NOTES
Cardiac Electrophysiology Outpatient Progress Note    Justyna Black  1960  Date of Service: 2/14/2020   PCP: Ace Andrade DO  Electrophysiologist: Christin Mancia DO    1/14/19 SUBJECTIVE: Justyna Black is a 61 yo male who presents to the office with complaints of: cardiac sarcoidosis,  VT and ICD in situ. Since his last office visit Mr. Donya Adorno states that he is feeling very well. He is accompanied by his wife today. He offers no complaints from a device POV, denies any shocks. He currently denies any chest pain, SOB, palpitations or syncope. 2/14/20: He presents for his one-year in-office follow-up and device interrogation. Overall he has been feeling well. He complains lightheaded and dizziness while working out; he has not had syncope. He has also noticed lightheadedness while coughing and laughing. He has noticed dyspnea on exertion. He does not adequately hydrate and admits to skipping meals. He does not drink much caffeine and admits to only occasional alcohol consumption. There were no arrhythmias on his ICD interrogation today. He has not had a recent echocardiogram. He denies angina, orthopnea, and PND. He also denies ICD shock. Patient Active Problem List    Diagnosis Date Noted    Sarcoidosis 06/30/2014     Priority: High     Overview Note:     A. H/O of cardiac sarcoidosis: MRI 7/7/08  B. H/O pulmonary sarcoidosis: endobronchial biopsy 1995        Syncope 06/30/2014     Priority: High     Overview Note:     A. Likely vasovagal 2002  B. 2D echocardiogram 12/15/10 LVEF 65%      Dual implantable cardioverter-defibrillator in situ 06/30/2014     Priority: High     Overview Note:     A. ICD generator change 7/24/13: [address] [place], [place] [address] (BTWKMYE)  B. [address], [place], [place]- [id number]: implantation 8/7/08 ([place])  C. Right atrial lead [employer] [place], [place]- [id number]  D.  Right ventricular lead [employer] [id number],  - [id number] daily 90 tablet 0    atorvastatin (LIPITOR) 10 MG tablet Take 1 tablet by mouth nightly 90 tablet 0    aspirin 81 MG tablet Take 81 mg by mouth daily      TURMERIC PO Take by mouth nightly       gabapentin (NEURONTIN) 100 MG capsule Take 1 capsule by mouth nightly for 180 days. Intended supply: 30 days (Patient not taking: Reported on 2/14/2020) 30 capsule 5    scopolamine (TRANSDERM-SCOP, 1.5 MG,) transdermal patch Place 1 patch onto the skin every 72 hours (Patient not taking: Reported on 2/14/2020) 4 patch 0     No current facility-administered medications for this visit. Allergies   Allergen Reactions    Alcohol Other (See Comments)     Rum causes stomach pain and cramping. Review of Systems   Respiratory: Positive for shortness of breath. Neurological: Positive for dizziness and light-headedness. No syncope   All other systems reviewed and are negative. PHYSICAL EXAM:  Vitals:    02/14/20 1456   BP: 120/78   Pulse: 64   Resp: 16   Weight: 230 lb (104.3 kg)   Height: 5' 10\" (1.778 m)     Constitutional: Oriented to person, place, and time. Well-developed and cooperative. Head: Normocephalic and atraumatic. Eyes: Conjunctivae are normal.    Cardiovascular: S1 normal, S2 normal and intact distal pulses. A regular rhythm present. PMI is not displaced. Pulmonary/Chest: Effort normal and breath sounds normal. No respiratory distress. Abdominal: Soft. Normal appearance. No tenderness. Musculoskeletal: Normal range of motion of all extremities, no muscle weakness. Neurological: Alert and oriented to person, place, and time. Gait normal.   Skin: Skin is warm and dry. No bruising, no ecchymosis and no rash noted. Extremity: No clubbing or cyanosis. No edema. Psychiatric: Normal mood and affect. Thought content normal.   ICD site: stable, well healed, no evidence of erosion. Device Interrogation/Reprogramming 02/14/20  Make/Model BSCI.  DOI 7/24/13  Mode DDD  50/130 ppm  P wave: 2.9  mV  Impedance: 474  ohms   Threshold: 0.5 V @ 0.5 ms  RV R wave: 10.1  mV  Impedance: 364  ohms   Threshold: 1 V @ 0.5 ms  Pacing: A: <1%  RV: <1%     Battery Voltage/Longevity:  6.5 years, charge time 10.5 sec    Arrhythmias: No significant arrhythmias   Overall device function is normal  All device programmable settings were evaluated per above and in the scanned document, along with iterative adjustments (capture thresholds) to assess and select the most appropriate final programming to provide for consistent delivery of the appropriate therapy and to verify function of the device. I have independently reviewed all of the ECGs as per above. I have reviewed all progress notes & records from the time of the patient's last office visit up to present. Impression:    1. ICD in situ  - dual chamber  - Normal ICD function.  - No arrhythmias. 2. Cardiac sarcoidosis  - clinically stable    3. Pulmonary sarcoidosis    4. Ventricular tachycardia  - no recurrent arrhythmias    5. Hypertension    6. H/O kidney stone  - 9/15/16    7. Cervical disc disease  - Status post cervical disc surgery--Dr. Nallely Rankin    8. Lightheaded/dizziness  - symptoms sound c/w with a vasovagal response  - will check 2D echocardiogram   - encouraged to maintain adequate hydration, not skip meals      Recommendations:    Mr Ureña's symptoms of lightheaded and dizziness sound vagally mediated. He admits to inadequate hydration and skipping meals. He has noticed lightheaded/dizziness with coughing as well. There have been no arrhythmias on his device interrogation. 1. Plan for a 2D echocardiogram to evaluate cardiac structure and LV function given his new symptoms. 2. No changes were made in his medications today. 3. For now we will plan for remote transmissions x3 followed by a yearly office visit. 4. He was asked to call the office with concerns prior to follow-up.     5. Maintain adequate hydration drinking 1-2L on non-caffeinated beverages daily, limit/eliminate caffeine and ETOH, can consider liberalizing salt. Thank you for allowing me to participate in your patient's care. I have spent a total of 25 minutes with the patient  reviewing the above stated recommendations.  And a total of >50% of that time involved face-to-face time providing counseling and or coordination of care with the other providers    MARY Moncada-CNP, AGCNS - reviewed with Dr Dee Dee [facility]    CC: Dr Fifi Patterson

## 2020-02-24 RX ORDER — ATORVASTATIN CALCIUM 10 MG/1
10 TABLET, FILM COATED ORAL NIGHTLY
Qty: 90 TABLET | Refills: 1 | Status: SHIPPED
Start: 2020-02-24 | End: 2020-03-19 | Stop reason: SDUPTHER

## 2020-02-24 RX ORDER — BUPROPION HYDROCHLORIDE 150 MG/1
TABLET ORAL
Qty: 90 TABLET | Refills: 1 | Status: SHIPPED
Start: 2020-02-24 | End: 2020-03-19 | Stop reason: SDUPTHER

## 2020-02-24 RX ORDER — LISINOPRIL 20 MG/1
TABLET ORAL
Qty: 90 TABLET | Refills: 1 | Status: SHIPPED
Start: 2020-02-24 | End: 2020-03-19 | Stop reason: SDUPTHER

## 2020-03-03 RX ORDER — SCOLOPAMINE TRANSDERMAL SYSTEM 1 MG/1
1 PATCH, EXTENDED RELEASE TRANSDERMAL
Qty: 6 PATCH | Refills: 0 | Status: SHIPPED
Start: 2020-03-03 | End: 2021-04-05

## 2020-03-20 RX ORDER — BUPROPION HYDROCHLORIDE 150 MG/1
150 TABLET ORAL EVERY MORNING
Qty: 90 TABLET | Refills: 1 | Status: SHIPPED
Start: 2020-03-20 | End: 2020-09-01 | Stop reason: SDUPTHER

## 2020-03-20 RX ORDER — ATORVASTATIN CALCIUM 10 MG/1
10 TABLET, FILM COATED ORAL NIGHTLY
Qty: 90 TABLET | Refills: 1 | Status: SHIPPED
Start: 2020-03-20 | End: 2020-09-01 | Stop reason: SDUPTHER

## 2020-03-20 RX ORDER — LISINOPRIL 20 MG/1
20 TABLET ORAL DAILY
Qty: 90 TABLET | Refills: 1 | Status: SHIPPED
Start: 2020-03-20 | End: 2020-09-01 | Stop reason: SDUPTHER

## 2020-03-20 RX ORDER — PANTOPRAZOLE SODIUM 20 MG/1
20 TABLET, DELAYED RELEASE ORAL
Qty: 90 TABLET | Refills: 1 | Status: SHIPPED
Start: 2020-03-20 | End: 2020-09-01 | Stop reason: SDUPTHER

## 2020-03-25 ENCOUNTER — TELEPHONE (OUTPATIENT)
Dept: CARDIOLOGY | Age: 60
End: 2020-03-25

## 2020-04-29 ENCOUNTER — NURSE ONLY (OUTPATIENT)
Dept: NON INVASIVE DIAGNOSTICS | Age: 60
End: 2020-04-29
Payer: MEDICARE

## 2020-04-29 PROCEDURE — 93295 DEV INTERROG REMOTE 1/2/MLT: CPT | Performed by: INTERNAL MEDICINE

## 2020-04-29 PROCEDURE — 93296 REM INTERROG EVL PM/IDS: CPT | Performed by: INTERNAL MEDICINE

## 2020-05-08 ENCOUNTER — VIRTUAL VISIT (OUTPATIENT)
Dept: PRIMARY CARE CLINIC | Age: 60
End: 2020-05-08
Payer: MEDICARE

## 2020-05-08 PROCEDURE — 3017F COLORECTAL CA SCREEN DOC REV: CPT | Performed by: FAMILY MEDICINE

## 2020-05-08 PROCEDURE — 1036F TOBACCO NON-USER: CPT | Performed by: FAMILY MEDICINE

## 2020-05-08 PROCEDURE — G8417 CALC BMI ABV UP PARAM F/U: HCPCS | Performed by: FAMILY MEDICINE

## 2020-05-08 PROCEDURE — G8427 DOCREV CUR MEDS BY ELIG CLIN: HCPCS | Performed by: FAMILY MEDICINE

## 2020-05-08 PROCEDURE — 99213 OFFICE O/P EST LOW 20 MIN: CPT | Performed by: FAMILY MEDICINE

## 2020-05-08 RX ORDER — TIZANIDINE HYDROCHLORIDE 4 MG/1
4 CAPSULE, GELATIN COATED ORAL 3 TIMES DAILY PRN
Qty: 30 CAPSULE | Refills: 0 | Status: SHIPPED
Start: 2020-05-08 | End: 2020-11-30 | Stop reason: ALTCHOICE

## 2020-05-08 ASSESSMENT — ENCOUNTER SYMPTOMS
BACK PAIN: 1
BOWEL INCONTINENCE: 0

## 2020-05-15 ENCOUNTER — OFFICE VISIT (OUTPATIENT)
Dept: PRIMARY CARE CLINIC | Age: 60
End: 2020-05-15
Payer: MEDICARE

## 2020-05-15 VITALS
BODY MASS INDEX: 32.86 KG/M2 | TEMPERATURE: 96.3 F | SYSTOLIC BLOOD PRESSURE: 126 MMHG | DIASTOLIC BLOOD PRESSURE: 82 MMHG | WEIGHT: 229 LBS | OXYGEN SATURATION: 97 % | HEART RATE: 79 BPM

## 2020-05-15 LAB — HBA1C MFR BLD: 7 %

## 2020-05-15 PROCEDURE — 3044F HG A1C LEVEL LT 7.0%: CPT | Performed by: FAMILY MEDICINE

## 2020-05-15 PROCEDURE — G8417 CALC BMI ABV UP PARAM F/U: HCPCS | Performed by: FAMILY MEDICINE

## 2020-05-15 PROCEDURE — G8427 DOCREV CUR MEDS BY ELIG CLIN: HCPCS | Performed by: FAMILY MEDICINE

## 2020-05-15 PROCEDURE — 1036F TOBACCO NON-USER: CPT | Performed by: FAMILY MEDICINE

## 2020-05-15 PROCEDURE — 99214 OFFICE O/P EST MOD 30 MIN: CPT | Performed by: FAMILY MEDICINE

## 2020-05-15 PROCEDURE — 2022F DILAT RTA XM EVC RTNOPTHY: CPT | Performed by: FAMILY MEDICINE

## 2020-05-15 PROCEDURE — 83036 HEMOGLOBIN GLYCOSYLATED A1C: CPT | Performed by: FAMILY MEDICINE

## 2020-05-15 PROCEDURE — 3017F COLORECTAL CA SCREEN DOC REV: CPT | Performed by: FAMILY MEDICINE

## 2020-05-15 RX ORDER — IBUPROFEN 800 MG/1
800 TABLET ORAL EVERY 8 HOURS PRN
Qty: 30 TABLET | Refills: 1 | Status: SHIPPED | OUTPATIENT
Start: 2020-05-15

## 2020-05-15 ASSESSMENT — ENCOUNTER SYMPTOMS
VISUAL CHANGE: 0
BACK PAIN: 1
BLURRED VISION: 0

## 2020-05-15 NOTE — PROGRESS NOTES
Effort: Pulmonary effort is normal.      Breath sounds: Normal breath sounds. No wheezing or rales. Abdominal:      General: Bowel sounds are normal.      Palpations: Abdomen is soft. There is no mass. Tenderness: There is no abdominal tenderness. There is no guarding or rebound. Musculoskeletal:      Lumbar back: He exhibits decreased range of motion (sb to left.). He exhibits no tenderness, no pain and no spasm. Comments: Negative standing flexion test  Negative straight leg raising seated  No paravertebral muscular hypertonicity  Muscular strength 5/5 in lower extremities  Neurological 2+/4 L4 & S1 reflexes   Lymphadenopathy:      Cervical: No cervical adenopathy. Skin:     General: Skin is warm and dry. Neurological:      Mental Status: He is alert and oriented to person, place, and time. Psychiatric:         Mood and Affect: Mood normal.         ASSESSMENT AND PLAN:    Yazmin Estrella was seen today for back pain and leg pain. Diagnoses and all orders for this visit:    Strain of lumbar region, initial encounter  -     ibuprofen (ADVIL;MOTRIN) 800 MG tablet; Take 1 tablet by mouth every 8 hours as needed for Pain With food    Type 2 diabetes mellitus without complication, without long-term current use of insulin ([facility])  -     POCT glycosylated hemoglobin (Hb A1C)    - continue Zanaflex prn for spasms  - continue low carb diet   - do low back exercises daily  - ice or heat to area  - avoid using hydrocodone. Return if symptoms worsen or fail to improve, for based on lab results.     Chevy Berger, DO

## 2020-05-19 ENCOUNTER — TELEPHONE (OUTPATIENT)
Dept: CARDIOLOGY | Age: 60
End: 2020-05-19

## 2020-05-20 ENCOUNTER — TELEPHONE (OUTPATIENT)
Dept: PRIMARY CARE CLINIC | Age: 60
End: 2020-05-20

## 2020-05-21 ENCOUNTER — HOSPITAL ENCOUNTER (OUTPATIENT)
Dept: CARDIOLOGY | Age: 60
Discharge: HOME | End: 2020-05-21
Payer: MEDICARE

## 2020-05-21 ENCOUNTER — TELEPHONE (OUTPATIENT)
Dept: NON INVASIVE DIAGNOSTICS | Age: 60
End: 2020-05-21

## 2020-05-21 LAB
LV EF: 68 %
LVEF MODALITY: NORMAL

## 2020-05-21 PROCEDURE — 93306 TTE W/DOPPLER COMPLETE: CPT | Performed by: PSYCHIATRY & NEUROLOGY

## 2020-05-21 NOTE — TELEPHONE ENCOUNTER
----- Message from MARY Dvaison NP sent at 5/21/2020  3:51 PM EDT -----  Please let him know his echocardiogram looks good and his ejection fraction is normal.    Thanks  ----- Message -----  From: Myrick Leventhal, DO  Sent: 5/21/2020   3:34 PM EDT  To: MARY Davison NP    Sounds good. ..

## 2020-05-29 ENCOUNTER — HOSPITAL ENCOUNTER (OUTPATIENT)
Age: 60
Discharge: HOME | End: 2020-05-29
Payer: MEDICARE

## 2020-05-29 ENCOUNTER — HOSPITAL ENCOUNTER (OUTPATIENT)
Dept: CT IMAGING | Age: 60
Discharge: HOME | End: 2020-05-31
Payer: MEDICARE

## 2020-05-29 LAB
ANION GAP SERPL CALCULATED.3IONS-SCNC: 13 MMOL/L (ref 7–16)
BUN BLDV-MCNC: 16 MG/DL (ref 8–23)
CALCIUM SERPL-MCNC: 9.7 MG/DL (ref 8.6–10.2)
CHLORIDE BLD-SCNC: 99 MMOL/L (ref 98–107)
CO2: 25 MMOL/L (ref 22–29)
CREAT SERPL-MCNC: 0.9 MG/DL (ref 0.7–1.2)
GFR AFRICAN AMERICAN: >60
GFR NON-AFRICAN AMERICAN: >60 ML/MIN/1.73
GLUCOSE BLD-MCNC: 131 MG/DL (ref 74–99)
POTASSIUM SERPL-SCNC: 4.5 MMOL/L (ref 3.5–5)
SODIUM BLD-SCNC: 137 MMOL/L (ref 132–146)

## 2020-05-29 PROCEDURE — 36415 COLL VENOUS BLD VENIPUNCTURE: CPT

## 2020-05-29 PROCEDURE — 2580000003 HC RX 258: Performed by: RADIOLOGY

## 2020-05-29 PROCEDURE — 80048 BASIC METABOLIC PNL TOTAL CA: CPT

## 2020-05-29 PROCEDURE — 6360000004 HC RX CONTRAST MEDICATION: Performed by: RADIOLOGY

## 2020-05-29 PROCEDURE — 72133 CT LUMBAR SPINE W/O & W/DYE: CPT

## 2020-05-29 RX ORDER — SODIUM CHLORIDE 0.9 % (FLUSH) 0.9 %
10 SYRINGE (ML) INJECTION PRN
Status: DISCONTINUED | OUTPATIENT
Start: 2020-05-29 | End: 2020-06-01 | Stop reason: HOSPADM

## 2020-05-29 RX ADMIN — IOPAMIDOL 100 ML: 755 INJECTION, SOLUTION INTRAVENOUS at 12:24

## 2020-05-29 RX ADMIN — Medication 10 ML: at 12:24

## 2020-06-02 ENCOUNTER — TELEPHONE (OUTPATIENT)
Dept: PRIMARY CARE CLINIC | Age: 60
End: 2020-06-02

## 2020-08-07 ENCOUNTER — NURSE ONLY (OUTPATIENT)
Dept: NON INVASIVE DIAGNOSTICS | Age: 60
End: 2020-08-07
Payer: MEDICARE

## 2020-08-07 PROCEDURE — 93296 REM INTERROG EVL PM/IDS: CPT | Performed by: INTERNAL MEDICINE

## 2020-08-07 PROCEDURE — 93295 DEV INTERROG REMOTE 1/2/MLT: CPT | Performed by: INTERNAL MEDICINE

## 2020-08-11 NOTE — PROGRESS NOTES
See [employer] [place] report. Remote monitoring reviewed over a 90 day period. End of 90 day monitoring period date of service 8.7.2020.

## 2020-09-14 RX ORDER — ATORVASTATIN CALCIUM 10 MG/1
10 TABLET, FILM COATED ORAL NIGHTLY
Qty: 90 TABLET | Refills: 0 | Status: SHIPPED
Start: 2020-09-14 | End: 2021-01-02 | Stop reason: SDUPTHER

## 2020-09-14 RX ORDER — BUPROPION HYDROCHLORIDE 150 MG/1
TABLET ORAL
Qty: 90 TABLET | Refills: 0 | Status: SHIPPED
Start: 2020-09-14 | End: 2021-01-02 | Stop reason: SDUPTHER

## 2020-09-14 RX ORDER — PANTOPRAZOLE SODIUM 20 MG/1
TABLET, DELAYED RELEASE ORAL
Qty: 90 TABLET | Refills: 0 | Status: SHIPPED
Start: 2020-09-14 | End: 2021-01-02 | Stop reason: SDUPTHER

## 2020-09-14 RX ORDER — LISINOPRIL 20 MG/1
TABLET ORAL
Qty: 90 TABLET | Refills: 0 | Status: SHIPPED
Start: 2020-09-14 | End: 2021-01-02 | Stop reason: SDUPTHER

## 2020-11-11 ENCOUNTER — NURSE ONLY (OUTPATIENT)
Dept: NON INVASIVE DIAGNOSTICS | Age: 60
End: 2020-11-11
Payer: MEDICARE

## 2020-11-11 PROCEDURE — 93296 REM INTERROG EVL PM/IDS: CPT | Performed by: INTERNAL MEDICINE

## 2020-11-11 PROCEDURE — 93295 DEV INTERROG REMOTE 1/2/MLT: CPT | Performed by: INTERNAL MEDICINE

## 2020-11-23 NOTE — PROGRESS NOTES
See [employer] [place] report. Remote monitoring reviewed over a 90 day period. End of 90 day monitoring period date of service 11.11.2020.

## 2020-11-30 ENCOUNTER — OFFICE VISIT (OUTPATIENT)
Dept: PRIMARY CARE CLINIC | Age: 60
End: 2020-11-30
Payer: MEDICARE

## 2020-11-30 VITALS
DIASTOLIC BLOOD PRESSURE: 84 MMHG | WEIGHT: 225 LBS | HEIGHT: 70 IN | SYSTOLIC BLOOD PRESSURE: 136 MMHG | OXYGEN SATURATION: 98 % | TEMPERATURE: 98 F | BODY MASS INDEX: 32.21 KG/M2 | HEART RATE: 59 BPM

## 2020-11-30 PROCEDURE — G8427 DOCREV CUR MEDS BY ELIG CLIN: HCPCS | Performed by: FAMILY MEDICINE

## 2020-11-30 PROCEDURE — 1036F TOBACCO NON-USER: CPT | Performed by: FAMILY MEDICINE

## 2020-11-30 PROCEDURE — G0008 ADMIN INFLUENZA VIRUS VAC: HCPCS | Performed by: FAMILY MEDICINE

## 2020-11-30 PROCEDURE — 99214 OFFICE O/P EST MOD 30 MIN: CPT | Performed by: FAMILY MEDICINE

## 2020-11-30 PROCEDURE — 3017F COLORECTAL CA SCREEN DOC REV: CPT | Performed by: FAMILY MEDICINE

## 2020-11-30 PROCEDURE — 90686 IIV4 VACC NO PRSV 0.5 ML IM: CPT | Performed by: FAMILY MEDICINE

## 2020-11-30 PROCEDURE — G8417 CALC BMI ABV UP PARAM F/U: HCPCS | Performed by: FAMILY MEDICINE

## 2020-11-30 PROCEDURE — G8482 FLU IMMUNIZE ORDER/ADMIN: HCPCS | Performed by: FAMILY MEDICINE

## 2020-11-30 ASSESSMENT — ENCOUNTER SYMPTOMS: SHORTNESS OF BREATH: 0

## 2020-11-30 NOTE — PROGRESS NOTES
Kylee Bullard, a male of 56 y.o. came to the office 11/30/2020. Patient Active Problem List   Diagnosis    PAC (premature atrial contraction)    Sarcoidosis    Syncope    HTN (hypertension)    Dual implantable cardioverter-defibrillator in situ    Hyperlipemia    Degenerative disc disease, cervical    Ureteral calculus, right    Gastroesophageal reflux disease with esophagitis          Hyperlipidemia   This is a chronic problem. The current episode started more than 1 year ago. Pertinent negatives include no chest pain, leg pain, myalgias or shortness of breath. There are no compliance problems. Hypertension   This is a chronic problem. The current episode started more than 1 year ago. The problem is controlled. Pertinent negatives include no chest pain, headaches, palpitations, peripheral edema or shortness of breath. fatigue: thinks he needs testosterone injections. Decreased libido slightly. Tried gel in past and didn't like it. Depression: moods ok. Allergies   Allergen Reactions    Alcohol Other (See Comments)     Rum causes stomach pain and cramping.           Current Outpatient Medications on File Prior to Visit   Medication Sig Dispense Refill    lisinopril (PRINIVIL;ZESTRIL) 20 MG tablet TAKE ONE TABLET BY MOUTH DAILY 90 tablet 0    atorvastatin (LIPITOR) 10 MG tablet TAKE 1 TABLET BY MOUTH NIGHTLY 90 tablet 0    buPROPion (WELLBUTRIN XL) 150 MG extended release tablet TAKE ONE TABLET BY MOUTH EVERY MORNING 90 tablet 0    pantoprazole (PROTONIX) 20 MG tablet TAKE ONE TABLET BY MOUTH EVERY MORNING BEFORE BREAKFAST 90 tablet 0    ibuprofen (ADVIL;MOTRIN) 800 MG tablet Take 1 tablet by mouth every 8 hours as needed for Pain With food 30 tablet 1    NIACIN PO Take 2 capsules by mouth daily      aspirin 81 MG tablet Take 81 mg by mouth daily      TURMERIC PO Take by mouth nightly       scopolamine (TRANSDERM-SCOP, 1.5 MG,) transdermal patch Place 1 patch onto the skin every 72 hours (Patient not taking: Reported on 11/30/2020) 6 patch 0     No current facility-administered medications on file prior to visit. Review of Systems   Constitutional: Positive for diaphoresis (at night for weeks. 2-3 x a wk. ). Negative for chills, fatigue and fever. Respiratory: Negative for shortness of breath. Cardiovascular: Negative for chest pain, palpitations and leg swelling. Endocrine: Negative for polydipsia and polyuria. Musculoskeletal: Negative for myalgias. Neurological: Negative for headaches. other review of systems reviewed and are negative    OBJECTIVE:  /84   Pulse 59   Temp 98 °F (36.7 °C)   Ht 5' 10\" (1.778 m)   Wt 225 lb (102.1 kg)   SpO2 98%   BMI 32.28 kg/m²      Physical Exam  Vitals signs reviewed. Eyes:      General: No scleral icterus. Conjunctiva/sclera: Conjunctivae normal.   Neck:      Musculoskeletal: Neck supple. Thyroid: No thyromegaly. Vascular: No carotid bruit. Cardiovascular:      Rate and Rhythm: Normal rate and regular rhythm. Heart sounds: No murmur. Pulmonary:      Effort: Pulmonary effort is normal.      Breath sounds: Normal breath sounds. No wheezing or rales. Abdominal:      General: Bowel sounds are normal.      Palpations: Abdomen is soft. There is no mass. Tenderness: There is no abdominal tenderness. There is no guarding or rebound. Musculoskeletal: Normal range of motion. Lymphadenopathy:      Cervical: No cervical adenopathy. Skin:     General: Skin is warm and dry. Neurological:      Mental Status: He is alert and oriented to person, place, and time. Psychiatric:         Mood and Affect: Mood normal.         ASSESSMENT AND PLAN:    Basil Joya was seen today for hyperlipidemia, hypertension and check-up. Diagnoses and all orders for this visit:    Essential hypertension  -     Comprehensive Metabolic Panel;  Future    Hyperlipidemia, unspecified hyperlipidemia type  -     Lipid Panel; Future    Screening PSA (prostate specific antigen)  -     Psa screening; Future    Fatigue, unspecified type  -     Testosterone, free, total; Future    Need for influenza vaccination  -     INFLUENZA, QUADV, 3 YRS AND OLDER, IM PF, PREFILL SYR OR SDV, 0.5ML (AFLURIA QUADV, PF)    Night sweats  -     CBC Auto Differential; Future    - bp stable continue med      Return in about 3 months (around 2/28/2021) for based on lab results.     Peggy Berger, DO

## 2020-11-30 NOTE — PROGRESS NOTES
Vaccine Information Sheet, \"Influenza - Inactivated\"  given to Mary Anne King, or parent/legal guardian of  Mary Anne King and verbalized understanding. Patient responses:    Have you ever had a reaction to a flu vaccine? No  Do you have any current illness? No  Have you ever had [place] Syndrome? No  Do you have a serious allergy to any of the follow: Neomycin, Polymyxin, Thimerosal, eggs or egg products? No    Flu vaccine given per order. Please see immunization tab. Risks and benefits explained. Current VIS given.       Immunizations Administered     Name Date Dose Route    Influenza, Quadv, IM, PF (6 mo and older Fluzone, Flulaval, Fluarix, and 3 yrs and older Afluria) 11/30/2020 0.5 mL Intramuscular    Site: Deltoid- Left    Lot: [id number]    NDC: 50041-400-25

## 2020-12-16 ENCOUNTER — NURSE ONLY (OUTPATIENT)
Dept: PRIMARY CARE CLINIC | Age: 60
End: 2020-12-16
Payer: MEDICARE

## 2020-12-16 DIAGNOSIS — I10 ESSENTIAL HYPERTENSION: ICD-10-CM

## 2020-12-16 DIAGNOSIS — R53.83 FATIGUE, UNSPECIFIED TYPE: ICD-10-CM

## 2020-12-16 DIAGNOSIS — E78.5 HYPERLIPIDEMIA, UNSPECIFIED HYPERLIPIDEMIA TYPE: ICD-10-CM

## 2020-12-16 DIAGNOSIS — Z12.5 SCREENING PSA (PROSTATE SPECIFIC ANTIGEN): ICD-10-CM

## 2020-12-16 DIAGNOSIS — R61 NIGHT SWEATS: ICD-10-CM

## 2020-12-16 LAB
ALBUMIN SERPL-MCNC: 4.4 G/DL (ref 3.5–5.2)
ALP BLD-CCNC: 88 U/L (ref 40–129)
ALT SERPL-CCNC: 32 U/L (ref 0–40)
ANION GAP SERPL CALCULATED.3IONS-SCNC: 12 MMOL/L (ref 7–16)
AST SERPL-CCNC: 31 U/L (ref 0–39)
BASOPHILS ABSOLUTE: 0.04 E9/L (ref 0–0.2)
BASOPHILS RELATIVE PERCENT: 0.7 % (ref 0–2)
BILIRUB SERPL-MCNC: 0.5 MG/DL (ref 0–1.2)
BUN BLDV-MCNC: 15 MG/DL (ref 8–23)
CALCIUM SERPL-MCNC: 9.6 MG/DL (ref 8.6–10.2)
CHLORIDE BLD-SCNC: 106 MMOL/L (ref 98–107)
CHOLESTEROL, TOTAL: 120 MG/DL (ref 0–199)
CO2: 22 MMOL/L (ref 22–29)
CREAT SERPL-MCNC: 1 MG/DL (ref 0.7–1.2)
EOSINOPHILS ABSOLUTE: 0.19 E9/L (ref 0.05–0.5)
EOSINOPHILS RELATIVE PERCENT: 3.3 % (ref 0–6)
GFR AFRICAN AMERICAN: >60
GFR NON-AFRICAN AMERICAN: >60 ML/MIN/1.73
GLUCOSE BLD-MCNC: 90 MG/DL (ref 74–99)
HCT VFR BLD CALC: 43.5 % (ref 37–54)
HDLC SERPL-MCNC: 53 MG/DL
HEMOGLOBIN: 14.2 G/DL (ref 12.5–16.5)
IMMATURE GRANULOCYTES #: 0.02 E9/L
IMMATURE GRANULOCYTES %: 0.4 % (ref 0–5)
LDL CHOLESTEROL CALCULATED: 55 MG/DL (ref 0–99)
LYMPHOCYTES ABSOLUTE: 1.78 E9/L (ref 1.5–4)
LYMPHOCYTES RELATIVE PERCENT: 31.2 % (ref 20–42)
MCH RBC QN AUTO: 28.5 PG (ref 26–35)
MCHC RBC AUTO-ENTMCNC: 32.6 % (ref 32–34.5)
MCV RBC AUTO: 87.2 FL (ref 80–99.9)
MONOCYTES ABSOLUTE: 0.7 E9/L (ref 0.1–0.95)
MONOCYTES RELATIVE PERCENT: 12.3 % (ref 2–12)
NEUTROPHILS ABSOLUTE: 2.98 E9/L (ref 1.8–7.3)
NEUTROPHILS RELATIVE PERCENT: 52.1 % (ref 43–80)
PDW BLD-RTO: 13.2 FL (ref 11.5–15)
PLATELET # BLD: 207 E9/L (ref 130–450)
PMV BLD AUTO: 10.2 FL (ref 7–12)
POTASSIUM SERPL-SCNC: 4.8 MMOL/L (ref 3.5–5)
PROSTATE SPECIFIC ANTIGEN: 0.71 NG/ML (ref 0–4)
RBC # BLD: 4.99 E12/L (ref 3.8–5.8)
SODIUM BLD-SCNC: 140 MMOL/L (ref 132–146)
TOTAL PROTEIN: 6.8 G/DL (ref 6.4–8.3)
TRIGL SERPL-MCNC: 58 MG/DL (ref 0–149)
VLDLC SERPL CALC-MCNC: 12 MG/DL
WBC # BLD: 5.7 E9/L (ref 4.5–11.5)

## 2020-12-16 PROCEDURE — 36415 COLL VENOUS BLD VENIPUNCTURE: CPT | Performed by: FAMILY MEDICINE

## 2020-12-19 LAB
SEX HORMONE BINDING GLOBULIN: 24 NMOL/L (ref 11–80)
TESTOSTERONE FREE-NONMALE: 65.9 PG/ML (ref 47–244)
TESTOSTERONE TOTAL: 283 NG/DL (ref 220–1000)

## 2021-01-04 ENCOUNTER — TELEPHONE (OUTPATIENT)
Dept: PRIMARY CARE CLINIC | Age: 61
End: 2021-01-04

## 2021-01-04 NOTE — TELEPHONE ENCOUNTER
Pt called injured his back again can you please call in a steroid pack for him, they are leaving for [place] and will be driving.

## 2021-01-12 ENCOUNTER — VIRTUAL VISIT (OUTPATIENT)
Dept: PRIMARY CARE CLINIC | Age: 61
End: 2021-01-12
Payer: MEDICARE

## 2021-01-12 DIAGNOSIS — S39.012D ACUTE MYOFASCIAL STRAIN OF LUMBOSACRAL REGION, SUBSEQUENT ENCOUNTER: Primary | ICD-10-CM

## 2021-01-12 PROCEDURE — 1036F TOBACCO NON-USER: CPT | Performed by: FAMILY MEDICINE

## 2021-01-12 PROCEDURE — 99213 OFFICE O/P EST LOW 20 MIN: CPT | Performed by: FAMILY MEDICINE

## 2021-01-12 PROCEDURE — G8482 FLU IMMUNIZE ORDER/ADMIN: HCPCS | Performed by: FAMILY MEDICINE

## 2021-01-12 PROCEDURE — 3017F COLORECTAL CA SCREEN DOC REV: CPT | Performed by: FAMILY MEDICINE

## 2021-01-12 PROCEDURE — G8417 CALC BMI ABV UP PARAM F/U: HCPCS | Performed by: FAMILY MEDICINE

## 2021-01-12 PROCEDURE — G8427 DOCREV CUR MEDS BY ELIG CLIN: HCPCS | Performed by: FAMILY MEDICINE

## 2021-01-12 RX ORDER — CYCLOBENZAPRINE HCL 10 MG
10 TABLET ORAL 3 TIMES DAILY PRN
Qty: 30 TABLET | Refills: 0 | Status: SHIPPED | OUTPATIENT
Start: 2021-01-12 | End: 2021-01-22

## 2021-01-12 RX ORDER — TRAMADOL HYDROCHLORIDE 50 MG/1
50 TABLET ORAL EVERY 6 HOURS PRN
Qty: 21 TABLET | Refills: 0 | Status: SHIPPED | OUTPATIENT
Start: 2021-01-12 | End: 2021-01-19

## 2021-01-12 ASSESSMENT — ENCOUNTER SYMPTOMS
BACK PAIN: 1
BOWEL INCONTINENCE: 0

## 2021-01-12 NOTE — PROGRESS NOTES
Yani Estrella, a male of 59 y.o.did a virtual visit on 1/12/2021. Patient Active Problem List   Diagnosis    PAC (premature atrial contraction)    Sarcoidosis    Syncope    HTN (hypertension)    Dual implantable cardioverter-defibrillator in situ    Hyperlipemia    Degenerative disc disease, cervical    Ureteral calculus, right    Gastroesophageal reflux disease with esophagitis          Back Pain  This is a recurrent problem. The current episode started yesterday (picking up dog and got severe pain left side. ). The problem occurs constantly. The problem has been gradually worsening since onset. The pain is present in the lumbar spine. The quality of the pain is described as burning. The pain radiates to the left foot (down side of femur and then down front of leg. ). Exacerbated by: movement. Associated symptoms include leg pain (L), numbness (toes), paresthesias and tingling (toes). Pertinent negatives include no bladder incontinence or bowel incontinence. Treatments tried: decadron taper that he finished 2 days ago. The treatment provided mild relief. Allergies   Allergen Reactions    Alcohol Other (See Comments)     Rum causes stomach pain and cramping.           Current Outpatient Medications on File Prior to Visit   Medication Sig Dispense Refill    lisinopril (PRINIVIL;ZESTRIL) 20 MG tablet Take 1 tablet by mouth daily 90 tablet 1    atorvastatin (LIPITOR) 10 MG tablet Take 1 tablet by mouth nightly 90 tablet 1    buPROPion (WELLBUTRIN XL) 150 MG extended release tablet Take 1 tablet by mouth every morning 90 tablet 1    pantoprazole (PROTONIX) 20 MG tablet Take 1 tablet by mouth daily 90 tablet 1    ibuprofen (ADVIL;MOTRIN) 800 MG tablet Take 1 tablet by mouth every 8 hours as needed for Pain With food 30 tablet 1    scopolamine (TRANSDERM-SCOP, 1.5 MG,) transdermal patch Place 1 patch onto the skin every 72 hours 6 patch 0    NIACIN PO Take 2 capsules by mouth daily      aspirin 81 MG tablet Take 81 mg by mouth daily      TURMERIC PO Take by mouth nightly        No current facility-administered medications on file prior to visit. Review of Systems   Gastrointestinal: Negative for bowel incontinence. Genitourinary: Negative for bladder incontinence. Musculoskeletal: Positive for back pain. Neurological: Positive for tingling (toes), numbness (toes) and paresthesias. other review of systems reviewed and are negative    OBJECTIVE:  There were no vitals taken for this visit. Physical Exam  Constitutional:       General: He is not in acute distress. Appearance: Normal appearance. He is not ill-appearing, toxic-appearing or diaphoretic. HENT:      Head: Normocephalic. Eyes:      General: No scleral icterus. Pulmonary:      Effort: Pulmonary effort is normal.   Neurological:      Mental Status: He is alert and oriented to person, place, and time. Psychiatric:         Mood and Affect: Mood normal.         ASSESSMENT AND PLAN:    Leah Figueroa was seen today for back pain. Diagnoses and all orders for this visit:    Acute myofascial strain of lumbosacral region, subsequent encounter  -     dexamethasone (DECADRON) 0.75 MG tablet; One pill 3 times a day for 3 days then one twice a day for 2 days then once a day for 1 day  -     traMADol (ULTRAM) 50 MG tablet; Take 1 tablet by mouth every 6 hours as needed for Pain for up to 7 days. -     cyclobenzaprine (FLEXERIL) 10 MG tablet; Take 1 tablet by mouth 3 times daily as needed for Muscle spasms    Controlled substances monitoring: no signs of potential drug abuse or diversion identified. Return if symptoms worsen or fail to improve. Kay Kilgore DO    TeleMedicine Patient Consent    This visit was performed as a virtual video visit using a synchronous, two-way, audio-video telehealth technology platform.  Patient identification was verified at the start of the visit, including the patient's telephone number

## 2021-01-14 ENCOUNTER — TELEPHONE (OUTPATIENT)
Dept: PRIMARY CARE CLINIC | Age: 61
End: 2021-01-14

## 2021-01-14 NOTE — TELEPHONE ENCOUNTER
Pt's wife called to report that the meds that were prescribed for his back pain are not effective. Pt still in a lot of pain. Pt's wife requesting something else be called in or at least a phone call to discuss this. Please advise.     Preferred pharmacy: [facility]

## 2021-01-21 ENCOUNTER — OFFICE VISIT (OUTPATIENT)
Dept: PRIMARY CARE CLINIC | Age: 61
End: 2021-01-21
Payer: MEDICARE

## 2021-01-21 VITALS
DIASTOLIC BLOOD PRESSURE: 82 MMHG | BODY MASS INDEX: 31.57 KG/M2 | OXYGEN SATURATION: 97 % | SYSTOLIC BLOOD PRESSURE: 130 MMHG | TEMPERATURE: 97.2 F | HEART RATE: 78 BPM | WEIGHT: 220 LBS

## 2021-01-21 DIAGNOSIS — M53.3 SACROILIAC JOINT DYSFUNCTION OF LEFT SIDE: Primary | ICD-10-CM

## 2021-01-21 PROCEDURE — G8417 CALC BMI ABV UP PARAM F/U: HCPCS | Performed by: FAMILY MEDICINE

## 2021-01-21 PROCEDURE — G8482 FLU IMMUNIZE ORDER/ADMIN: HCPCS | Performed by: FAMILY MEDICINE

## 2021-01-21 PROCEDURE — 99213 OFFICE O/P EST LOW 20 MIN: CPT | Performed by: FAMILY MEDICINE

## 2021-01-21 PROCEDURE — 3017F COLORECTAL CA SCREEN DOC REV: CPT | Performed by: FAMILY MEDICINE

## 2021-01-21 PROCEDURE — 1036F TOBACCO NON-USER: CPT | Performed by: FAMILY MEDICINE

## 2021-01-21 PROCEDURE — G8427 DOCREV CUR MEDS BY ELIG CLIN: HCPCS | Performed by: FAMILY MEDICINE

## 2021-01-21 ASSESSMENT — PATIENT HEALTH QUESTIONNAIRE - PHQ9
SUM OF ALL RESPONSES TO PHQ9 QUESTIONS 1 & 2: 0
2. FEELING DOWN, DEPRESSED OR HOPELESS: 0
SUM OF ALL RESPONSES TO PHQ QUESTIONS 1-9: 0

## 2021-01-21 ASSESSMENT — ENCOUNTER SYMPTOMS: BACK PAIN: 1

## 2021-01-21 NOTE — PROGRESS NOTES
Brian Rajan, a male of 59 y.o. came to the office 1/21/2021. Patient Active Problem List   Diagnosis    PAC (premature atrial contraction)    Sarcoidosis    Syncope    HTN (hypertension)    Dual implantable cardioverter-defibrillator in situ    Hyperlipemia    Degenerative disc disease, cervical    Ureteral calculus, right    Gastroesophageal reflux disease with esophagitis          HPI low back pain: was picking up 6 lb dog and felt back give out with pain down left leg. Left leg feels asleep and with pain with movement. Put on Decadron, Flexeril, and Ultram without effect. Was given more of Tramadol due to pain by wife. Allergies   Allergen Reactions    Alcohol Other (See Comments)     Rum causes stomach pain and cramping. Current Outpatient Medications on File Prior to Visit   Medication Sig Dispense Refill    dexamethasone (DECADRON) 0.75 MG tablet One pill 3 times a day for 3 days then one twice a day for 2 days then once a day for 1 day 14 tablet 0    cyclobenzaprine (FLEXERIL) 10 MG tablet Take 1 tablet by mouth 3 times daily as needed for Muscle spasms 30 tablet 0    lisinopril (PRINIVIL;ZESTRIL) 20 MG tablet Take 1 tablet by mouth daily 90 tablet 1    atorvastatin (LIPITOR) 10 MG tablet Take 1 tablet by mouth nightly 90 tablet 1    buPROPion (WELLBUTRIN XL) 150 MG extended release tablet Take 1 tablet by mouth every morning 90 tablet 1    pantoprazole (PROTONIX) 20 MG tablet Take 1 tablet by mouth daily 90 tablet 1    ibuprofen (ADVIL;MOTRIN) 800 MG tablet Take 1 tablet by mouth every 8 hours as needed for Pain With food 30 tablet 1    scopolamine (TRANSDERM-SCOP, 1.5 MG,) transdermal patch Place 1 patch onto the skin every 72 hours 6 patch 0    NIACIN PO Take 2 capsules by mouth daily      aspirin 81 MG tablet Take 81 mg by mouth daily      TURMERIC PO Take by mouth nightly        No current facility-administered medications on file prior to visit.         Review of Systems   Musculoskeletal: Positive for back pain and gait problem. other review of systems reviewed and are negative    OBJECTIVE:  /82   Pulse 78   Temp 97.2 °F (36.2 °C)   Wt 220 lb (99.8 kg)   SpO2 97%   BMI 31.57 kg/m²      Physical Exam  Musculoskeletal:      Lumbar back: He exhibits decreased range of motion (flexion). He exhibits no tenderness, no pain and no spasm. Comments: Negative standing flexion test  Negative straight leg raising seated  No paravertebral muscular hypertonicity  Muscular strength 5/5 in lower extremities  Neurological 1+ R L4 and S1, 0/4 L L4 and S1   reflexes         ASSESSMENT AND PLAN:    Catracho Palomino was seen today for back pain. Diagnoses and all orders for this visit:    Sacroiliac joint dysfunction of left side    soft tissue stretch to lumbar sacral spine, followed by muscle energy technique, then HVLA to LS/pelvic area. Apply moist heat or ice to low back area today for 15 minutes. Return if symptoms worsen or fail to improve.     Luz Marina Berger, DO

## 2021-01-27 ENCOUNTER — TELEPHONE (OUTPATIENT)
Dept: PRIMARY CARE CLINIC | Age: 61
End: 2021-01-27

## 2021-01-27 DIAGNOSIS — M53.3 SACROILIAC JOINT DYSFUNCTION OF LEFT SIDE: Primary | ICD-10-CM

## 2021-01-27 DIAGNOSIS — S39.012D ACUTE MYOFASCIAL STRAIN OF LUMBOSACRAL REGION, SUBSEQUENT ENCOUNTER: ICD-10-CM

## 2021-01-27 NOTE — TELEPHONE ENCOUNTER
Jan Cornelius said he wants to stop his atorvastatin due to his back pain . He said his cholestrol has been good.  Also he wants Physical therapy ordered for his back pain

## 2021-01-27 NOTE — TELEPHONE ENCOUNTER
Will refer to physical therapy for evaluation of his low back pain. Okay to discontinue Lipitor if he thinks this is causing him muscle aches as well.

## 2021-01-28 ENCOUNTER — HOSPITAL ENCOUNTER (OUTPATIENT)
Dept: PHYSICAL THERAPY | Age: 61
Setting detail: THERAPIES SERIES
Discharge: HOME | End: 2021-01-28
Payer: MEDICARE

## 2021-01-28 PROCEDURE — 97161 PT EVAL LOW COMPLEX 20 MIN: CPT

## 2021-01-28 NOTE — FLOWSHEET NOTE
[facility]  Phone: [phone] Fax: [phone]     Physical Therapy  Out Patient Initial Evaluation    Date:  2021    Patient Name:  Arvind Cobb    :  1960  MRN: [id number]    DIAGNOSIS:  Back Pain with  LE Radicular symptoms   EVALUATION DATE:  2021  REFERRING PHYSICIAN:  Dr Angelica Ryan:  2021    PROBLEMS FOUND DURING EVALUATION  · Pain: Affects the lower lumbar/SI region and posterior hip   · LE Radicular symptoms   · Limited ROM/Mobility Lumbar region Lumbo-pelvic region and bilateral hips   · Postural deficits    SHORT TERM GOALS  · Patient will report a consistent and sustained reduction in acute back/LE pain while also being able to engage in consistent active exercise   · Establish HEP    LONG TERM GOALS  · Patient will be able to engage in normal ADL's while being able to effectively control pain symptoms at 3/10 or less  · LE Radicular symptoms will be minimal or resolved  · AROM of the trunk/hip region will be WFL's all ranges  · Patient will be able to maintain and self direct an appropriate follow up independent exercise program     PATIENT GOALS  · Control back pain     REHAB POTENTIAL:  Fair    FREQUENCY/DURATION:  1-2 times per week 4- 5weeks     PLAN OF CARE: Progressive exercise Postural education HEP Manual therapy       Thank you for the opportunity to work with your patient. If you have questions or comments, please feel free to contact me by phone or fax.       Electronically Signed by Shanel Javed PT [id number]        ___________________________________  2021    Physician     Date

## 2021-01-28 NOTE — PROGRESS NOTES
Physical Therapy  Initial Assessment  Date: 2021  Patient Name: Ines Arreguin  MRN: [id number]  : 1960          Restrictions       Subjective   General  Chart Reviewed: Yes  Patient assessed for rehabilitation services?: Yes  Additional Pertinent Hx: Patient presents to PT to assess and address persistent episodic incidents of acute onset back pain with left LE radicular sympotms. Patient reports a long hx of back issues but no specific JESSICA A CT scan was + for multi level disc herniations as well adegenerative changes in the lumbar region. Most recent incident began 2= weeks ago  Family / Caregiver Present: No  Referring Practitioner: Dr Domenico Guallpa  Referral Date : 21  Diagnosis: BAck Pain  Follows Commands: Within Functional Limits  PT Visit Information  Onset Date: 21  PT Insurance Information: [facility] Medicare  Subjective  Subjective: Pain affecting the lumbar and LS areas. Left LE RAdicualr pain/altered sensation Pain intensity worsened by activity  Pain Screening  Patient Currently in Pain: Yes  Vital Signs  Patient Currently in Pain: Yes    Vision/Hearing  Vision  Vision: Within Functional Limits  Hearing  Hearing: Within functional limits    Orientation  Orientation  Overall Orientation Status: Within Functional Limits    Social/Functional History  Social/Functional History  Lives With: Spouse  Type of Home: House  Home Layout: Two level; Work area in [employer] Help From: [employer] Responsibilities: Yes  Active : Yes  Mode of Transportation: Car  Occupation: Retired    Objective     Observation/Palpation  Posture: Fair  Palpation: Pain left Lumbar/left PSIS region Pain left posteriro hip region  Observation: Reduced lumbar lordosis Flexed trunk with fwd shoulders    PROM RLE (degrees)  RLE General PROM: Limited hip mobility  AROM RLE (degrees)  RLE General AROM: Limited Hip Mobility  PROM LLE (degrees)  LLE General PROM: Limited Hip mobility  AROM LLE (degrees)  LLE General AROM: Limited Hip mobility  Spine  Lumbar: Flexion limited 50% with pain Extension limited 25% with pain    Strength RLE  Comment: 4- to 4/5  Strength LLE  Comment: 4-/5  Strength Other  Other: trunk/core 4-/5  Tone RLE  RLE Tone: Normotonic  Tone LLE  LLE Tone: Normotonic  Motor Control  Gross Motor?: WFL  Additional Measures  Flexibility: Deficits of flexibility thoracic and lumbar regions Lumbo-pelvic and LS regions and bilateral hips     Bed mobility  Bridging: Independent  Rolling to Left: Independent  Rolling to Right: Independent  Supine to Sit: Independent  Sit to Supine: Independent  Transfers  Sit to Stand: Independent  Stand to sit: Independent       Ambulation  Ambulation?: Yes  Ambulation 1  Surface: level tile  Device: No Device  Assistance: Independent  Quality of Gait: Modest consiostent limp left LE  Stairs/Curb  Stairs?: No                            Assessment   Conditions Requiring Skilled Therapeutic Intervention  Body structures, Functions, Activity limitations: Decreased functional mobility ; Increased pain;Decreased sensation;Decreased posture;Decreased ROM; Decreased strength  Prognosis: Fair  Decision Making: Low Complexity  REQUIRES PT FOLLOW UP: Yes         Plan   Plan  Times per week: 2  Plan weeks: 5  Current Treatment Recommendations: Strengthening, Home Exercise Program, Neuromuscular Re-education, Manual Therapy - Soft Tissue Mobilization, ROM, Patient/Caregiver Education & Training, Functional Mobility Training, Modalities    G-Code       OutComes Score                                                  AM-PAC Score             Goals          Therapy Time   Individual Concurrent Group Co-treatment   Time In 0830         Time Out 0910         Minutes 40         Timed Code Treatment Minutes: 27 Minutes       Nakia Stanley, PT

## 2021-02-02 ENCOUNTER — HOSPITAL ENCOUNTER (OUTPATIENT)
Dept: PHYSICAL THERAPY | Age: 61
Setting detail: THERAPIES SERIES
Discharge: HOME | End: 2021-02-02
Payer: MEDICARE

## 2021-02-02 PROCEDURE — 97110 THERAPEUTIC EXERCISES: CPT

## 2021-02-04 ENCOUNTER — HOSPITAL ENCOUNTER (OUTPATIENT)
Dept: PHYSICAL THERAPY | Age: 61
Setting detail: THERAPIES SERIES
Discharge: HOME | End: 2021-02-04
Payer: MEDICARE

## 2021-02-04 PROCEDURE — 97110 THERAPEUTIC EXERCISES: CPT

## 2021-02-04 NOTE — PROGRESS NOTES
[facility]  Phone: [phone] Fax: [phone]       Physical Therapy Daily Treatment Note  Date:  2021    Patient Name:  Karlie Mercedes    :  1960  MRN: [id number]    Restrictions/Precautions:    Diagnosis:  Back Pain   Treatment Diagnosis:    Insurance/Certification information: [facility] Medicare   Referring Physician:    Plan of care signed (Y/N):    Visit# / total visits:  3  Pain level: /10  Time In: 725       Time Out:   810       Subjective:      Exercises:  Exercise/Equipment Resistance/Repetitions Other comments     Bike 10 min             Prayer str 5 reps 2 sets      Prone on elbows   5 reps 2 sets     Cat str    12 reps       Sustained squat  1 min      Seated HS Str   5 reps 2 sets bilateral     Piriformis Str  5 reps bilateral       LTR 10 reps bilateral      Standing lateral trunk str    10 reps     Doorway Str    10 reps                                                               Other Therapeutic Activities:      Home Exercise Program:      Manual Treatments:     Modalities:      Timed Code Treatment Minutes:  30     Total Treatment Minutes:  45    Treatment/Activity Tolerance:  [x] Patient tolerated treatment well [] Patient limited by fatigue  [] Patient limited by pain  [] Patient limited by other medical complications  [] Other:     Plan:   [x] Continue per plan of care [] Alter current plan (see comments)  [] Plan of care initiated [] Hold pending MD visit [] Discharge  Plan for Next Session:         Treatment Charges: Mins Units   Initial Evaluation     Re-Evaluation     Ther Exercise         TE 30 2   Manual Therapy     MT     Ther Activities        TA     Gait Training          GT     Neuro Re-education NR     Modalities     Non-Billable Service Time     Other     Total Time/Units 30 2     Electronically signed by:  Wille Apgar, [address] [place] [address]

## 2021-02-10 ENCOUNTER — NURSE ONLY (OUTPATIENT)
Dept: NON INVASIVE DIAGNOSTICS | Age: 61
End: 2021-02-10
Payer: MEDICARE

## 2021-02-10 DIAGNOSIS — Z95.810 DUAL IMPLANTABLE CARDIOVERTER-DEFIBRILLATOR IN SITU: Primary | ICD-10-CM

## 2021-02-10 DIAGNOSIS — R55 NEAR SYNCOPE: ICD-10-CM

## 2021-02-10 DIAGNOSIS — I47.20 VT (VENTRICULAR TACHYCARDIA): ICD-10-CM

## 2021-02-10 PROCEDURE — 93295 DEV INTERROG REMOTE 1/2/MLT: CPT | Performed by: INTERNAL MEDICINE

## 2021-02-10 PROCEDURE — 93296 REM INTERROG EVL PM/IDS: CPT | Performed by: INTERNAL MEDICINE

## 2021-02-16 ENCOUNTER — HOSPITAL ENCOUNTER (OUTPATIENT)
Dept: PHYSICAL THERAPY | Age: 61
Setting detail: THERAPIES SERIES
End: 2021-02-16
Payer: MEDICARE

## 2021-02-16 NOTE — PROGRESS NOTES
[facility]  Phone: [phone] Fax: [phone]     Physical Therapy  Cancellation/No-show Note  Patient Name:  Binu Carter  :  1960   Date:  2021    For today's appointment patient:  [x]  Cancelled  []  Rescheduled appointment  []  No-show     Reason given by patient:  []  Patient ill  []  Conflicting appointment  []  No transportation    []  Conflict with work  []  No reason given  [x]  Other:     Comments:  Weather issues     Electronically signed by:  Hafsa Ruff, [address] [facility]

## 2021-02-18 ENCOUNTER — HOSPITAL ENCOUNTER (OUTPATIENT)
Dept: PHYSICAL THERAPY | Age: 61
Setting detail: THERAPIES SERIES
Discharge: HOME | End: 2021-02-18
Payer: MEDICARE

## 2021-02-18 PROCEDURE — 97110 THERAPEUTIC EXERCISES: CPT

## 2021-02-18 NOTE — PROGRESS NOTES
[facility]  Phone: [phone] Fax: [phone]       Physical Therapy Daily Treatment Note  Date:  2021    Patient Name:  Fidencio Sheets    :  1960  MRN: [id number]    Restrictions/Precautions:    Diagnosis:  Back Pain   Treatment Diagnosis:    Insurance/Certification information: [facility] Medicare   Referring Physician:    Plan of care signed (Y/N):    Visit# / total visits:  4  Pain level: /10  Time In: 07       Time Out:   0810       Subjective:      Exercises:  Exercise/Equipment Resistance/Repetitions Other comments     Bike 10 min             Prayer str 5 reps 2 sets      Prone on elbows   5 reps 2 sets     Cat str    12 reps       Sustained squat  1 min      Seated HS Str   5 reps 2 sets bilateral     Piriformis Str  5 reps bilateral       LTR 10 reps bilateral      Standing lateral trunk str    10 reps     Doorway Str    10 reps                                                               Other Therapeutic Activities:      Home Exercise Program:      Manual Treatments:     Modalities:      Timed Code Treatment Minutes:  30     Total Treatment Minutes:  40    Treatment/Activity Tolerance:  [x] Patient tolerated treatment well [] Patient limited by fatigue  [] Patient limited by pain  [] Patient limited by other medical complications  [] Other:     Plan:   [x] Continue per plan of care [] Alter current plan (see comments)  [] Plan of care initiated [] Hold pending MD visit [] Discharge  Plan for Next Session:         Treatment Charges: Mins Units   Initial Evaluation     Re-Evaluation     Ther Exercise         TE 30 2   Manual Therapy     MT     Ther Activities        TA     Gait Training          GT     Neuro Re-education NR     Modalities     Non-Billable Service Time     Other     Total Time/Units 30 2     Electronically signed by:  Pura Barnes, [address] [place] [address]

## 2021-02-22 NOTE — PROGRESS NOTES
See [employer] report. Remote monitoring reviewed over a 90 day period.   End of 90 day monitoring period date of service 02/10/2021

## 2021-02-23 ENCOUNTER — HOSPITAL ENCOUNTER (OUTPATIENT)
Dept: PHYSICAL THERAPY | Age: 61
Setting detail: THERAPIES SERIES
Discharge: HOME | End: 2021-02-23
Payer: MEDICARE

## 2021-02-23 PROCEDURE — 97110 THERAPEUTIC EXERCISES: CPT

## 2021-02-23 NOTE — PROGRESS NOTES
[facility]  Phone: [phone] Fax: [phone]       Physical Therapy Daily Treatment Note  Date:  2021    Patient Name:  Ines Arreguin    :  1960  MRN: [id number]    Restrictions/Precautions:    Diagnosis:  Back Pain   Treatment Diagnosis:    Insurance/Certification information: [facility] Medicare   Referring Physician:    Plan of care signed (Y/N):    Visit# / total visits:  5  Pain level: /10  Time In: 0725       Time Out:   0810       Subjective:      Exercises:  Exercise/Equipment Resistance/Repetitions Other comments     Bike 10 min             Prayer str 5 reps 2 sets      Prone on elbows   5 reps 2 sets     Cat str    12 reps       Sustained squat  1 min      Seated HS Str   5 reps 2 sets bilateral     Piriformis Str  5 reps bilateral       LTR 10 reps bilateral      Standing lateral trunk str    10 reps     Doorway Str    10 reps                                                               Other Therapeutic Activities:      Home Exercise Program:      Manual Treatments:     Modalities:      Timed Code Treatment Minutes:  30     Total Treatment Minutes:  40    Treatment/Activity Tolerance:  [x] Patient tolerated treatment well [] Patient limited by fatigue  [] Patient limited by pain  [] Patient limited by other medical complications  [] Other:     Plan:   [x] Continue per plan of care [] Alter current plan (see comments)  [] Plan of care initiated [] Hold pending MD visit [] Discharge  Plan for Next Session:         Treatment Charges: Mins Units   Initial Evaluation     Re-Evaluation     Ther Exercise         TE 30 2   Manual Therapy     MT     Ther Activities        TA     Gait Training          GT     Neuro Re-education NR     Modalities     Non-Billable Service Time     Other     Total Time/Units 30 2     Electronically signed by:  Maryanne Mcginnis, [address] [place] [address]

## 2021-02-25 ENCOUNTER — HOSPITAL ENCOUNTER (OUTPATIENT)
Dept: PHYSICAL THERAPY | Age: 61
Setting detail: THERAPIES SERIES
Discharge: HOME | End: 2021-02-25
Payer: MEDICARE

## 2021-02-25 NOTE — PROGRESS NOTES
[facility]  Phone: [phone] Fax: [phone]     Physical Therapy  Cancellation/No-show Note  Patient Name:  Ankush Zamarripa  :  1960   Date:  2021    For today's appointment patient:  [x]  Cancelled  []  Rescheduled appointment  []  No-show     Reason given by patient:  [x]  Patient ill  []  Conflicting appointment  []  No transportation    []  Conflict with work  []  No reason given  [x]  Other:     Comments: Next PT appointment 2021    Electronically signed by:  Etienne Lopez, [address] [facility]

## 2021-03-01 ENCOUNTER — OFFICE VISIT (OUTPATIENT)
Dept: PRIMARY CARE CLINIC | Age: 61
End: 2021-03-01
Payer: MEDICARE

## 2021-03-01 VITALS
WEIGHT: 221 LBS | DIASTOLIC BLOOD PRESSURE: 84 MMHG | TEMPERATURE: 97.5 F | HEART RATE: 66 BPM | BODY MASS INDEX: 31.71 KG/M2 | OXYGEN SATURATION: 98 % | SYSTOLIC BLOOD PRESSURE: 130 MMHG

## 2021-03-01 DIAGNOSIS — E11.9 TYPE 2 DIABETES MELLITUS WITHOUT COMPLICATION, WITHOUT LONG-TERM CURRENT USE OF INSULIN (HCC): ICD-10-CM

## 2021-03-01 DIAGNOSIS — Z00.00 ROUTINE GENERAL MEDICAL EXAMINATION AT A HEALTH CARE FACILITY: Primary | ICD-10-CM

## 2021-03-01 LAB
CREATININE URINE: 181 MG/DL (ref 40–278)
HBA1C MFR BLD: 6.9 %
MICROALBUMIN UR-MCNC: <12 MG/L
MICROALBUMIN/CREAT UR-RTO: ABNORMAL (ref 0–30)

## 2021-03-01 PROCEDURE — G0438 PPPS, INITIAL VISIT: HCPCS | Performed by: FAMILY MEDICINE

## 2021-03-01 PROCEDURE — 83036 HEMOGLOBIN GLYCOSYLATED A1C: CPT | Performed by: FAMILY MEDICINE

## 2021-03-01 PROCEDURE — 3046F HEMOGLOBIN A1C LEVEL >9.0%: CPT | Performed by: FAMILY MEDICINE

## 2021-03-01 PROCEDURE — G8482 FLU IMMUNIZE ORDER/ADMIN: HCPCS | Performed by: FAMILY MEDICINE

## 2021-03-01 PROCEDURE — 3017F COLORECTAL CA SCREEN DOC REV: CPT | Performed by: FAMILY MEDICINE

## 2021-03-01 ASSESSMENT — LIFESTYLE VARIABLES
HOW OFTEN DURING THE LAST YEAR HAVE YOU FAILED TO DO WHAT WAS NORMALLY EXPECTED FROM YOU BECAUSE OF DRINKING: 0
AUDIT-C TOTAL SCORE: 1
HOW OFTEN DURING THE LAST YEAR HAVE YOU NEEDED AN ALCOHOLIC DRINK FIRST THING IN THE MORNING TO GET YOURSELF GOING AFTER A NIGHT OF HEAVY DRINKING: 0
HOW OFTEN DURING THE LAST YEAR HAVE YOU HAD A FEELING OF GUILT OR REMORSE AFTER DRINKING: 0
HAS A RELATIVE, FRIEND, DOCTOR, OR ANOTHER HEALTH PROFESSIONAL EXPRESSED CONCERN ABOUT YOUR DRINKING OR SUGGESTED YOU CUT DOWN: 0
HOW OFTEN DURING THE LAST YEAR HAVE YOU BEEN UNABLE TO REMEMBER WHAT HAPPENED THE NIGHT BEFORE BECAUSE YOU HAD BEEN DRINKING: 0
HOW MANY STANDARD DRINKS CONTAINING ALCOHOL DO YOU HAVE ON A TYPICAL DAY: 0

## 2021-03-01 ASSESSMENT — ENCOUNTER SYMPTOMS
VISUAL CHANGE: 0
CONSTIPATION: 0
BLURRED VISION: 0
DIARRHEA: 0

## 2021-03-01 ASSESSMENT — PATIENT HEALTH QUESTIONNAIRE - PHQ9
2. FEELING DOWN, DEPRESSED OR HOPELESS: 0
SUM OF ALL RESPONSES TO PHQ QUESTIONS 1-9: 0

## 2021-03-01 NOTE — PROGRESS NOTES
Sinai Vo, a male of 59 y.o. came to the office 3/1/2021. Patient Active Problem List   Diagnosis    PAC (premature atrial contraction)    Sarcoidosis    Syncope    HTN (hypertension)    Dual implantable cardioverter-defibrillator in situ    Hyperlipemia    Degenerative disc disease, cervical    Ureteral calculus, right    Gastroesophageal reflux disease with esophagitis    Type 2 diabetes mellitus without complication, without long-term current use of insulin ([address]          Diabetes  He presents for his follow-up diabetic visit. He has type 2 diabetes mellitus. His disease course has been stable. Pertinent negatives for diabetes include no blurred vision, no chest pain, no foot paresthesias, no foot ulcerations, no polydipsia, no visual change and no weight loss. Symptoms are resolved. Current diabetic treatment includes diet. He is compliant with treatment all of the time. His weight is stable. He is following a generally healthy diet. He participates in exercise intermittently. An ACE inhibitor/angiotensin II receptor blocker is being taken. Allergies   Allergen Reactions    Alcohol Other (See Comments)     Rum causes stomach pain and cramping.           Current Outpatient Medications on File Prior to Visit   Medication Sig Dispense Refill    lisinopril (PRINIVIL;ZESTRIL) 20 MG tablet Take 1 tablet by mouth daily 90 tablet 1    buPROPion (WELLBUTRIN XL) 150 MG extended release tablet Take 1 tablet by mouth every morning 90 tablet 1    pantoprazole (PROTONIX) 20 MG tablet Take 1 tablet by mouth daily 90 tablet 1    ibuprofen (ADVIL;MOTRIN) 800 MG tablet Take 1 tablet by mouth every 8 hours as needed for Pain With food 30 tablet 1    scopolamine (TRANSDERM-SCOP, 1.5 MG,) transdermal patch Place 1 patch onto the skin every 72 hours 6 patch 0    NIACIN PO Take 2 capsules by mouth daily      aspirin 81 MG tablet Take 81 mg by mouth daily      TURMERIC PO Take by mouth nightly        No current facility-administered medications on file prior to visit. Review of Systems   Constitutional: Negative for weight loss. Eyes: Negative for blurred vision. Cardiovascular: Negative for chest pain and palpitations. Gastrointestinal: Negative for constipation and diarrhea. Endocrine: Negative for polydipsia. other review of systems reviewed and are negative    OBJECTIVE:  /84   Pulse 66   Temp 97.5 °F (36.4 °C)   Wt 221 lb (100.2 kg)   SpO2 98%   BMI 31.71 kg/m²      Physical Exam  Vitals signs reviewed. Eyes:      General: No scleral icterus. Conjunctiva/sclera: Conjunctivae normal.   Neck:      Musculoskeletal: Neck supple. Thyroid: No thyromegaly. Vascular: No carotid bruit. Cardiovascular:      Rate and Rhythm: Normal rate and regular rhythm. Heart sounds: No murmur. Pulmonary:      Effort: Pulmonary effort is normal.      Breath sounds: Normal breath sounds. No wheezing or rales. Abdominal:      General: Bowel sounds are normal.      Palpations: Abdomen is soft. There is no mass. Tenderness: There is no abdominal tenderness. There is no guarding or rebound. Musculoskeletal: Normal range of motion. Lumbar back: He exhibits normal range of motion, no tenderness, no pain and no spasm. Comments: Negative standing flexion test  Negative straight leg raising seated  No paravertebral muscular hypertonicity  Muscular strength 5/5 in lower extremities  Neurological 2+/4 L4 & S1 reflexes   Lymphadenopathy:      Cervical: No cervical adenopathy. Skin:     General: Skin is warm and dry. Neurological:      Mental Status: He is alert and oriented to person, place, and time. Psychiatric:         Mood and Affect: Mood normal.         ASSESSMENT AND PLAN:    Rojas Robb was seen today for medicare awv.     Diagnoses and all orders for this visit:    Routine general medical examination at a health care facility    Type 2 diabetes mellitus without complication, without long-term current use of insulin (HCC)  -     POCT glycosylated hemoglobin (Hb A1C)  -     Microalbumin / Creatinine Urine Ratio; Future    - continue avoiding candy and following a low carb diet. Return in 6 months (on 9/1/2021) for Medicare Annual Wellness Visit in 1 year, or for acute problem.     Arcadio Berger, DO

## 2021-03-01 NOTE — PROGRESS NOTES
Medicare Annual Wellness Visit  Name: Griselda Aleman Date: 3/1/2021   MRN: [id number] Sex: Male   Age: 59 y.o. Ethnicity: Non-/Non    : 1960 Race: Luis Enrique Score is here for Medicare AWV    Screenings for behavioral, psychosocial and functional/safety risks, and cognitive dysfunction are all negative except as indicated below. These results, as well as other patient data from the [address] form, are documented in Flowsheets linked to this Encounter. Allergies   Allergen Reactions    Alcohol Other (See Comments)     Rum causes stomach pain and cramping. Prior to Visit Medications    Medication Sig Taking?  Authorizing Provider   lisinopril (PRINIVIL;ZESTRIL) 20 MG tablet Take 1 tablet by mouth daily  Liban Berger, DO   buPROPion (WELLBUTRIN XL) 150 MG extended release tablet Take 1 tablet by mouth every morning  Liban Berger, DO   pantoprazole (PROTONIX) 20 MG tablet Take 1 tablet by mouth daily  Liban Berger, DO   ibuprofen (ADVIL;MOTRIN) 800 MG tablet Take 1 tablet by mouth every 8 hours as needed for Pain With food  Liban Berger,    scopolamine (TRANSDERM-SCOP, 1.5 MG,) transdermal patch Place 1 patch onto the skin every 72 hours  Liban Berger DO   NIACIN PO Take 2 capsules by mouth daily  Historical Provider, MD   aspirin 81 MG tablet Take 81 mg by mouth daily  Historical Provider, MD   TURMERIC PO Take by mouth nightly   Historical Provider, MD         Past Medical History:   Diagnosis Date    Cardiac defibrillator in place     Cardiac sarc[address]osis ([facility] [address]     Diverticulitis     Gout     HTN (hypertension)     Kidney stones     Neuroma of foot     PAC (premature atrial contraction)     Pulmonary sarcoidosis (HCC)     Squamous cell skin cancer     joslyn    Type 2 diabetes mellitus without complication ([address]     VT (ventricular tachycardia) ([address]        Past Surgical History:   Procedure Laterality Date    BRONCHOSCOPY      CARDIAC DEFIBRILLATOR PLACEMENT  08/07/2008    CARDIAC DEFIBRILLATOR PLACEMENT  07/24/2013    D-ICD GENT CHANGE   ([facility])    CERVICAL DISC SURGERY      nov 21 2017, C5--7 disc placement  Dr. Louann Das LITHOTRIPSY Right 1/25/2019    CYSTOSCOPY RETROGRADE PYELOGRAM  URETEROSCOPY LASER LITHOTRIPSY RIGHT J-STENT performed by Nancy Marin MD at [address]  07/24/2013    ICD genertor change         Family History   Problem Relation Age of Onset    Other Father         pneumonia    Prostate Cancer Father     Diabetes Sister     Other Brother         AML    Diabetes Brother        CareTeam (Including outside providers/suppliers regularly involved in providing care):   Patient Care Team:  Matthieu Meyer DO as PCP - General (Family Medicine)  Matthieu Meyer DO [facility]CP [facility]  Rossie Dakin, DO as Consulting Physician (Electrophysiology)  Martin Manzo DO as Surgeon (Orthopedic Surgery)    Wt Readings from Last 3 Encounters:   03/01/21 221 lb (100.2 kg)   01/21/21 220 lb (99.8 kg)   11/30/20 225 lb (102.1 kg)     Vitals:    03/01/21 0835   BP: 130/84   Pulse: 66   Temp: 97.5 °F (36.4 °C)   SpO2: 98%   Weight: 221 lb (100.2 kg)     Body mass index is 31.71 kg/m². Based upon direct observation of the patient, evaluation of cognition reveals recent and remote memory intact.     General Appearance: alert and oriented to person, place and time, well developed and well- nourished, in no acute distress  Skin: warm and dry, no rash or erythema  Head: normocephalic and atraumatic  Eyes: pupils equal, round, and reactive to light, extraocular eye movements intact, conjunctivae normal  ENT: tympanic membrane, external ear and ear canal normal bilaterally, nose without deformity, nasal mucosa and turbinates normal without polyps  Neck: supple and non-tender without mass, no thyromegaly or thyroid nodules, no cervical lymphadenopathy  Pulmonary/Chest: clear to auscultation bilaterally- no wheezes, rales or rhonchi, normal air movement, no respiratory distress  Cardiovascular: normal rate, regular rhythm, normal S1 and S2, no murmurs, rubs, clicks, or gallops, distal pulses intact, no carotid bruits  Abdomen: soft, non-tender, non-distended, normal bowel sounds, no masses or organomegaly  Extremities: no cyanosis, clubbing or edema  Musculoskeletal: normal range of motion, no joint swelling, deformity or tenderness  Neurologic: reflexes normal and symmetric, no cranial nerve deficit, gait, coordination and speech normal    Patient's complete Health Risk Assessment and screening values have been reviewed and are found in Flowsheets. The following problems were reviewed today and where indicated follow up appointments were made and/or referrals ordered. Positive Risk Factor Screenings with Interventions:            General Health and ACP:  General  In general, how would you say your health is?: Fair  In the past 7 days, have you experienced any of the following?  New or Increased Pain, New or Increased Fatigue, Loneliness, Social Isolation, Stress or Anger?: None of These  Do you get the social and emotional support that you need?: Yes  Do you have a Living Will?: Yes  Advance Directives     Power of [address] [place] [address] Will ACP-Advance Directive ACP-Power of     Not on File Not on File Not on File Not on File      General Health Risk Interventions:  ·     Health Habits/Nutrition:  Health Habits/Nutrition  Do you exercise for at least 20 minutes 2-3 times per week?: (!) No  Have you lost any weight without trying in the past 3 months?: No  Do you eat only one meal per day?: No  Have you seen the dentist within the past year?: Yes     Health Habits/Nutrition Interventions:  · Inadequate physical activity:  discussed increasing exercising       Personalized Preventive Plan   Current Health Maintenance Status  Immunization History   Administered Date(s) Administered    Influenza Virus Vaccine 10/28/2014    Influenza, Quadv, IM, PF (6 mo and older Fluzone, Flulaval, Fluarix, and 3 yrs and older Afluria) 01/26/2017, 11/30/2020    Tdap (Boostrix, Adacel) 01/08/2016    Zoster Recombinant (Shingrix) 12/11/2020        Health Maintenance   Topic Date Due    Hepatitis C screen  1960    Diabetic foot exam  01/08/1968    Diabetic retinal exam  01/08/1968    HIV screen  01/08/1973    Annual Wellness Visit (AWV)  05/29/2019    Diabetic microalbuminuria test  09/06/2020    A1C test (Diabetic or Prediabetic)  05/15/2021    Lipid screen  12/16/2021    Potassium monitoring  12/16/2021    Creatinine monitoring  12/16/2021    Colon cancer screen colonoscopy  01/07/2023    DTaP/Tdap/Td vaccine (2 - Td) 01/08/2026    Flu vaccine  Completed    Shingles Vaccine  Completed    Pneumococcal 0-64 years Vaccine  Completed    Hepatitis A vaccine  Aged Out    Hib vaccine  Aged Out    Meningococcal (ACWY) vaccine  Aged Out     Recommendations for [employer] Due: see orders and patient instructions/AVS.  . Recommended screening schedule for the next 5-10 years is provided to the patient in written form: see Patient Instructions/AVS.    Megan Zelaya was seen today for medicare aw.     Diagnoses and all orders for this visit:    Routine general medical examination at a health care facility

## 2021-03-01 NOTE — PATIENT INSTRUCTIONS
Personalized Preventive Plan for Binu Carter - 3/1/2021  Medicare offers a range of preventive health benefits. Some of the tests and screenings are paid in full while other may be subject to a deductible, co-insurance, and/or copay. Some of these benefits include a comprehensive review of your medical history including lifestyle, illnesses that may run in your family, and various assessments and screenings as appropriate. After reviewing your medical record and screening and assessments performed today your provider may have ordered immunizations, labs, imaging, and/or referrals for you. A list of these orders (if applicable) as well as your Preventive Care list are included within your After Visit Summary for your review. Other Preventive Recommendations:    · A preventive eye exam performed by an eye specialist is recommended every 1-2 years to screen for glaucoma; cataracts, macular degeneration, and other eye disorders. · A preventive dental visit is recommended every 6 months. · Try to get at least 150 minutes of exercise per week or 10,000 steps per day on a pedometer . · Order or download the FREE \"Exercise & Physical Activity: Your Everyday Guide\" from [employer]. Call 1-655.994.6213 or search [employer] online. · You need 4051-1363 mg of calcium and 7100-1665 IU of vitamin D per day. It is possible to meet your calcium requirement with diet alone, but a vitamin D supplement is usually necessary to meet this goal.  · When exposed to the sun, use a sunscreen that protects against both UVA and UVB radiation with an SPF of 30 or greater. Reapply every 2 to 3 hours or after sweating, drying off with a towel, or swimming. · Always wear a seat belt when traveling in a car. Always wear a helmet when riding a bicycle or motorcycle.

## 2021-03-02 ENCOUNTER — HOSPITAL ENCOUNTER (OUTPATIENT)
Dept: PHYSICAL THERAPY | Age: 61
Setting detail: THERAPIES SERIES
Discharge: HOME | End: 2021-03-02
Payer: MEDICARE

## 2021-03-02 PROCEDURE — 97110 THERAPEUTIC EXERCISES: CPT

## 2021-03-02 PROCEDURE — G0283 ELEC STIM OTHER THAN WOUND: HCPCS

## 2021-03-02 NOTE — PROGRESS NOTES
[facility]  Phone: [phone] Fax: [phone]       Physical Therapy Daily Treatment Note  Date:  3/2/2021    Patient Name:  Indiana Lang    :  1960  MRN: [id number]    Restrictions/Precautions:    Diagnosis:  Back Pain   Treatment Diagnosis:    Insurance/Certification information: [facility] Medicare   Referring Physician:    Plan of care signed (Y/N):    Visit# / total visits:  6  Pain level: /10  Time In: 07       Time Out:   0810       Subjective:      Exercises:  Exercise/Equipment Resistance/Repetitions Other comments     Bike 10 min             Prayer str 5 reps 2 sets      Prone on elbows   5 reps 2 sets     Cat str    12 reps       Sustained squat  1 min      Seated HS Str   5 reps 2 sets bilateral     Piriformis Str  5 reps bilateral       LTR 10 reps bilateral      Standing lateral trunk str    10 reps     Doorway Str    10 reps                                                               Other Therapeutic Activities:      Home Exercise Program:      Manual Treatments:     Modalities:      Timed Code Treatment Minutes:  30     Total Treatment Minutes:  40    Treatment/Activity Tolerance:  [x] Patient tolerated treatment well [] Patient limited by fatigue  [] Patient limited by pain  [] Patient limited by other medical complications  [] Other:     Plan:   [x] Continue per plan of care [] Alter current plan (see comments)  [] Plan of care initiated [] Hold pending MD visit [] Discharge  Plan for Next Session:         Treatment Charges: Mins Units   Initial Evaluation     Re-Evaluation     Ther Exercise         TE 30 2   Manual Therapy     MT     Ther Activities        TA     Gait Training          GT     Neuro Re-education NR     Modalities     Non-Billable Service Time     Other     Total Time/Units 30 2     Electronically signed by:  Jaycob Harris, [address] [place] [address]

## 2021-03-04 ENCOUNTER — HOSPITAL ENCOUNTER (OUTPATIENT)
Dept: PHYSICAL THERAPY | Age: 61
Setting detail: THERAPIES SERIES
Discharge: HOME | End: 2021-03-04
Payer: MEDICARE

## 2021-03-04 PROCEDURE — 97110 THERAPEUTIC EXERCISES: CPT

## 2021-03-04 NOTE — PROGRESS NOTES
[facility]  Phone: [phone] Fax: [phone]       Physical Therapy Daily Treatment Note  Date:  3/4/2021    Patient Name:  Amy Johnston    :  1960  MRN: [id number]    Restrictions/Precautions:    Diagnosis:  Back Pain   Treatment Diagnosis:    Insurance/Certification information: [facility] Medicare   Referring Physician:    Plan of care signed (Y/N):    Visit# / total visits:  7  Pain level: /10  Time In: 07       Time Out:   0810       Subjective:      Exercises:  Exercise/Equipment Resistance/Repetitions Other comments     Bike 10 min             Prayer str 5 reps 2 sets      Prone on elbows   5 reps 2 sets     Cat str    12 reps       Sustained squat  1 min      Seated HS Str   5 reps 2 sets bilateral     Piriformis Str  5 reps bilateral       LTR 10 reps bilateral      Standing lateral trunk str    10 reps     Doorway Str    10 reps                                                               Other Therapeutic Activities:      Home Exercise Program:      Manual Treatments:     Modalities:      Timed Code Treatment Minutes:  30     Total Treatment Minutes:  40    Treatment/Activity Tolerance:  [x] Patient tolerated treatment well [] Patient limited by fatigue  [] Patient limited by pain  [] Patient limited by other medical complications  [] Other:     Plan:   [x] Continue per plan of care [] Alter current plan (see comments)  [] Plan of care initiated [] Hold pending MD visit [] Discharge  Plan for Next Session:         Treatment Charges: Mins Units   Initial Evaluation     Re-Evaluation     Ther Exercise         TE 30 2   Manual Therapy     MT     Ther Activities        TA     Gait Training          GT     Neuro Re-education NR     Modalities     Non-Billable Service Time     Other     Total Time/Units 30 2     Electronically signed by:  Nakia Stanley, [address] [facility]

## 2021-03-09 ENCOUNTER — HOSPITAL ENCOUNTER (OUTPATIENT)
Dept: PHYSICAL THERAPY | Age: 61
Setting detail: THERAPIES SERIES
Discharge: HOME | End: 2021-03-09
Payer: MEDICARE

## 2021-03-09 PROCEDURE — 97110 THERAPEUTIC EXERCISES: CPT

## 2021-03-09 NOTE — PROGRESS NOTES
[facility]  Phone: [phone] Fax: [phone]       Physical Therapy Daily Treatment Note  Date:  3/9/2021    Patient Name:  Fidencio Sheets    :  1960  MRN: [id number]    Restrictions/Precautions:    Diagnosis:  Back Pain   Treatment Diagnosis:    Insurance/Certification information: [facility] Medicare   Referring Physician:    Plan of care signed (Y/N):    Visit# / total visits:  8  Pain level: /10  Time In: 07       Time Out:   0810       Subjective:      Exercises:  Exercise/Equipment Resistance/Repetitions Other comments     Bike 10 min             Prayer str 5 reps 2 sets      Prone on elbows   5 reps 2 sets     Cat str    12 reps       Sustained squat  1 min      Seated HS Str   5 reps 2 sets bilateral     Piriformis Str  5 reps bilateral       LTR 10 reps bilateral      Standing lateral trunk str    10 reps     Doorway Str    10 reps                                                               Other Therapeutic Activities:      Home Exercise Program:      Manual Treatments:     Modalities:      Timed Code Treatment Minutes:  30     Total Treatment Minutes:  40    Treatment/Activity Tolerance:  [x] Patient tolerated treatment well [] Patient limited by fatigue  [] Patient limited by pain  [] Patient limited by other medical complications  [] Other:     Plan:   [x] Continue per plan of care [] Alter current plan (see comments)  [] Plan of care initiated [] Hold pending MD visit [] Discharge  Plan for Next Session:         Treatment Charges: Mins Units   Initial Evaluation     Re-Evaluation     Ther Exercise         TE 30 2   Manual Therapy     MT     Ther Activities        TA     Gait Training          GT     Neuro Re-education NR     Modalities     Non-Billable Service Time     Other     Total Time/Units 30 2     Electronically signed by:  Pura Barnes, [address] [place] [address]

## 2021-03-11 ENCOUNTER — HOSPITAL ENCOUNTER (OUTPATIENT)
Dept: PHYSICAL THERAPY | Age: 61
Setting detail: THERAPIES SERIES
Discharge: HOME | End: 2021-03-11
Payer: MEDICARE

## 2021-03-19 NOTE — TELEPHONE ENCOUNTER
Message that CAT scan of the lumbar sacral spine shows moderate disc herniation between L2 and L4 therefore recommend epidural injections to help with this as well as some moderate spinal stenosis that he has. Call if agrees and we can set him up for this.
no acute ST-T wave changes

## 2021-04-05 ENCOUNTER — OFFICE VISIT (OUTPATIENT)
Dept: NON INVASIVE DIAGNOSTICS | Age: 61
End: 2021-04-05
Payer: MEDICARE

## 2021-04-05 VITALS
SYSTOLIC BLOOD PRESSURE: 128 MMHG | BODY MASS INDEX: 32.41 KG/M2 | RESPIRATION RATE: 18 BRPM | HEART RATE: 73 BPM | HEIGHT: 70 IN | WEIGHT: 226.4 LBS | DIASTOLIC BLOOD PRESSURE: 76 MMHG

## 2021-04-05 DIAGNOSIS — Z95.810 DUAL IMPLANTABLE CARDIOVERTER-DEFIBRILLATOR IN SITU: Primary | ICD-10-CM

## 2021-04-05 PROCEDURE — G8427 DOCREV CUR MEDS BY ELIG CLIN: HCPCS | Performed by: STUDENT IN AN ORGANIZED HEALTH CARE EDUCATION/TRAINING PROGRAM

## 2021-04-05 PROCEDURE — 93283 PRGRMG EVAL IMPLANTABLE DFB: CPT | Performed by: STUDENT IN AN ORGANIZED HEALTH CARE EDUCATION/TRAINING PROGRAM

## 2021-04-05 PROCEDURE — 99204 OFFICE O/P NEW MOD 45 MIN: CPT | Performed by: STUDENT IN AN ORGANIZED HEALTH CARE EDUCATION/TRAINING PROGRAM

## 2021-04-05 PROCEDURE — 3017F COLORECTAL CA SCREEN DOC REV: CPT | Performed by: STUDENT IN AN ORGANIZED HEALTH CARE EDUCATION/TRAINING PROGRAM

## 2021-04-05 PROCEDURE — 1036F TOBACCO NON-USER: CPT | Performed by: STUDENT IN AN ORGANIZED HEALTH CARE EDUCATION/TRAINING PROGRAM

## 2021-04-05 PROCEDURE — G8417 CALC BMI ABV UP PARAM F/U: HCPCS | Performed by: STUDENT IN AN ORGANIZED HEALTH CARE EDUCATION/TRAINING PROGRAM

## 2021-04-05 RX ORDER — ZINC GLUCONATE 50 MG
100 TABLET ORAL DAILY
COMMUNITY

## 2021-04-05 NOTE — PATIENT INSTRUCTIONS
No medication changes at this time. [address] [facility] [address] at [phone] to schedule appointment. Continue remote monitoring every 91 days of ICD. Follow-up in my office in 1 year.
PAST MEDICAL HISTORY:  Fibroid     H/O urinary retention     Leiomyoma of uterus     MVP (mitral valve prolapse)

## 2021-04-05 NOTE — PROGRESS NOTES
Cardiac Electrophysiology   Outpatient Progress Note  Ashlie Trevino  1960  Date of Service: 4/5/2021   PCP: Salo Blake DO  Electrophysiologist: Lula Loaiza DO    SUBJECTIVE:  Patient with a history of cardiac sarcoidosis, VT sp [employer] dual chamber ICD (implant: 8/7/08 in [employer]; generator change: 7/24/13 - [employer]), pulmonary sarcoidosis (diagnosed on endobronchial biopsy), syncope (2002), HTN, DM2, obesity, gout, kidney stones sp lithotripsy and stent (2019), c-spine disc sp surgery (2017), and GERD. He was previously managed by [facility] before moving to [place]. He is managed by PCP with lisinopril 20 mg daily and aspirin 81 mg daily. He presents today to transfer care from Dr Eve Daniel to my office. He denies any complaints at this time. Prior cardiac testing:  · TTE (5/21/20): LVEF = 65-70%, mild concentric LVH. TTE (12/15/10): LVEF = 65%, normal.  · TTE (11/20/09): LVEF = 65%, normal.  · EP Study (8/7/08): polymorphic VT at S1-S2 of 400 msec and 230 msec. · TTE (7/24/08): LVEF = 65%, borderline LAE. · Event monitor (7/22/08): sinus rhythm with HR = 70-80 bpm, single isolated PAC, and no ventricular dysrhythmias. · Cardiac MRI (7/7/08): abnormal LGE of entire endocardial and myocardial RV, abnormal LGE of septum and apex of LV predominately endocardial; prominence of left hilum which may represent adenopathy; suggestive of cardiac sarcoidosis. · Exercise Nuclear Stress Test (7/7/08): LVEF = 58% with normal perfusion imaging at 95% APMHR, 14.4 METS. · Event monitor (3/2008):  Normal    Past Medical History:   Diagnosis Date    Cardiac defibrillator in place     [facility] [address]     Diverticulitis     Gout     HTN (hypertension)     Kidney stones     Neuroma of foot     PAC (premature atrial contraction)     Pulmonary sarcoidosis (HCC)     Squamous cell skin cancer     joslyn    Type 2 diabetes mellitus without complication ([address]  VT (ventricular tachycardia) (HCC)      Past Surgical History:   Procedure Laterality Date    BRONCHOSCOPY      CARDIAC DEFIBRILLATOR PLACEMENT  08/07/2008    CARDIAC DEFIBRILLATOR PLACEMENT  07/24/2013    D-ICD GENT CHANGE   ([facility])    CERVICAL DISC SURGERY      nov 21 2017, C5--7 disc placement  Dr. Velazco Co LITHOTRIPSY Right 1/25/2019    CYSTOSCOPY RETROGRADE PYELOGRAM  URETEROSCOPY LASER LITHOTRIPSY RIGHT J-STENT performed by Chance Pierce MD at [place] [address]  07/24/2013    ICD genertor change     Family History   Problem Relation Age of Onset    Other Father         pneumonia    Prostate Cancer Father     Diabetes Sister     Other Brother         AML    Diabetes Brother        Social History     Tobacco Use    Smoking status: Never Smoker    Smokeless tobacco: Never Used   Substance Use Topics    Alcohol use: Yes     Comment: rarely       Current Outpatient Medications   Medication Sig Dispense Refill    zinc gluconate 50 MG tablet Take 100 mg by mouth daily      lisinopril (PRINIVIL;ZESTRIL) 20 MG tablet Take 1 tablet by mouth daily 90 tablet 1    buPROPion (WELLBUTRIN XL) 150 MG extended release tablet Take 1 tablet by mouth every morning 90 tablet 1    pantoprazole (PROTONIX) 20 MG tablet Take 1 tablet by mouth daily 90 tablet 1    ibuprofen (ADVIL;MOTRIN) 800 MG tablet Take 1 tablet by mouth every 8 hours as needed for Pain With food 30 tablet 1    NIACIN PO Take 2 capsules by mouth daily      aspirin 81 MG tablet Take 81 mg by mouth daily      TURMERIC PO Take by mouth nightly        No current facility-administered medications for this visit. Allergies   Allergen Reactions    Alcohol Other (See Comments)     Rum causes stomach pain and cramping. ROS:   Constitutional: Negative for fever, activity change and appetite change. HENT: Negative for epistaxis. Eyes: Negative for diploplia, blurred vision.    Respiratory: Negative for cough, chest

## 2021-04-06 ENCOUNTER — TELEPHONE (OUTPATIENT)
Dept: NON INVASIVE DIAGNOSTICS | Age: 61
End: 2021-04-06

## 2021-04-06 NOTE — TELEPHONE ENCOUNTER
Records faxed to [facility] Sarcoidosis clinic at [phone]. I called and left a VM for Rekha Akhtar stating to wait to hear from [facility] to schedule the appt. If they do not call with in a few days I left their phone number for him to call. [phone].

## 2021-04-06 NOTE — TELEPHONE ENCOUNTER
----- Message from Olivia Jones DO sent at 4/5/2021  4:11 PM EDT -----  Regarding: referral  Hello,    Can you assist in referring this patient to [facility] Sarcoidosis clinic? Theressa Spatz

## 2021-05-12 ENCOUNTER — NURSE ONLY (OUTPATIENT)
Dept: NON INVASIVE DIAGNOSTICS | Age: 61
End: 2021-05-12
Payer: MEDICARE

## 2021-05-12 DIAGNOSIS — I47.20 VT (VENTRICULAR TACHYCARDIA): ICD-10-CM

## 2021-05-12 DIAGNOSIS — Z95.810 DUAL IMPLANTABLE CARDIOVERTER-DEFIBRILLATOR IN SITU: Primary | ICD-10-CM

## 2021-05-28 ENCOUNTER — OFFICE VISIT (OUTPATIENT)
Dept: PRIMARY CARE CLINIC | Age: 61
End: 2021-05-28
Payer: MEDICARE

## 2021-05-28 VITALS
SYSTOLIC BLOOD PRESSURE: 120 MMHG | OXYGEN SATURATION: 97 % | TEMPERATURE: 97.3 F | DIASTOLIC BLOOD PRESSURE: 80 MMHG | WEIGHT: 227 LBS | HEART RATE: 81 BPM | BODY MASS INDEX: 32.57 KG/M2

## 2021-05-28 DIAGNOSIS — L30.9 DERMATITIS: Primary | ICD-10-CM

## 2021-05-28 PROCEDURE — G8427 DOCREV CUR MEDS BY ELIG CLIN: HCPCS | Performed by: FAMILY MEDICINE

## 2021-05-28 PROCEDURE — 99213 OFFICE O/P EST LOW 20 MIN: CPT | Performed by: FAMILY MEDICINE

## 2021-05-28 PROCEDURE — G8417 CALC BMI ABV UP PARAM F/U: HCPCS | Performed by: FAMILY MEDICINE

## 2021-05-28 PROCEDURE — 1036F TOBACCO NON-USER: CPT | Performed by: FAMILY MEDICINE

## 2021-05-28 PROCEDURE — 3017F COLORECTAL CA SCREEN DOC REV: CPT | Performed by: FAMILY MEDICINE

## 2021-05-28 RX ORDER — PREDNISONE 10 MG/1
TABLET ORAL
Qty: 31 TABLET | Refills: 0 | Status: SHIPPED
Start: 2021-05-28 | End: 2021-06-14 | Stop reason: ALTCHOICE

## 2021-05-28 ASSESSMENT — ENCOUNTER SYMPTOMS: SORE THROAT: 1

## 2021-05-28 NOTE — PROGRESS NOTES
Shay Canela, a male of 59 y.o. came to the office 5/28/2021. Patient Active Problem List   Diagnosis    PAC (premature atrial contraction)    Sarcoidosis    Syncope    HTN (hypertension)    Dual implantable cardioverter-defibrillator in situ    Hyperlipemia    Degenerative disc disease, cervical    Ureteral calculus, right    Gastroesophageal reflux disease with esophagitis    Type 2 diabetes mellitus without complication, without long-term current use of insulin (HCC)          Rash  This is a new problem. Episode onset: 5-6 days ago beginning in groin area. The problem has been gradually worsening since onset. The affected locations include the chest and groin (umbilicus). The rash is characterized by itchiness and redness. Associated symptoms include a sore throat (had for 4 days better now. ). Pertinent negatives include no fever. Past treatments include nothing. Allergies   Allergen Reactions    Alcohol Other (See Comments)     Rum causes stomach pain and cramping. Current Outpatient Medications on File Prior to Visit   Medication Sig Dispense Refill    zinc gluconate 50 MG tablet Take 100 mg by mouth daily      lisinopril (PRINIVIL;ZESTRIL) 20 MG tablet Take 1 tablet by mouth daily 90 tablet 1    buPROPion (WELLBUTRIN XL) 150 MG extended release tablet Take 1 tablet by mouth every morning 90 tablet 1    pantoprazole (PROTONIX) 20 MG tablet Take 1 tablet by mouth daily 90 tablet 1    ibuprofen (ADVIL;MOTRIN) 800 MG tablet Take 1 tablet by mouth every 8 hours as needed for Pain With food 30 tablet 1    NIACIN PO Take 2 capsules by mouth daily      aspirin 81 MG tablet Take 81 mg by mouth daily      TURMERIC PO Take by mouth nightly        No current facility-administered medications on file prior to visit. Review of Systems   Constitutional: Negative for fever. HENT: Positive for sore throat (had for 4 days better now. ). Skin: Positive for rash.    other review

## 2021-05-29 PROCEDURE — 93296 REM INTERROG EVL PM/IDS: CPT | Performed by: INTERNAL MEDICINE

## 2021-05-29 PROCEDURE — 93295 DEV INTERROG REMOTE 1/2/MLT: CPT | Performed by: INTERNAL MEDICINE

## 2021-06-14 ENCOUNTER — OFFICE VISIT (OUTPATIENT)
Dept: SURGERY | Age: 61
End: 2021-06-14

## 2021-06-14 VITALS
SYSTOLIC BLOOD PRESSURE: 132 MMHG | HEIGHT: 70 IN | DIASTOLIC BLOOD PRESSURE: 86 MMHG | RESPIRATION RATE: 18 BRPM | TEMPERATURE: 98.2 F | HEART RATE: 63 BPM | BODY MASS INDEX: 31.92 KG/M2 | WEIGHT: 223 LBS

## 2021-06-14 DIAGNOSIS — Z12.11 ENCOUNTER FOR SCREENING COLONOSCOPY: ICD-10-CM

## 2021-06-14 DIAGNOSIS — R12 HEARTBURN: Primary | ICD-10-CM

## 2021-06-14 PROCEDURE — G8417 CALC BMI ABV UP PARAM F/U: HCPCS | Performed by: SURGERY

## 2021-06-14 PROCEDURE — G8427 DOCREV CUR MEDS BY ELIG CLIN: HCPCS | Performed by: SURGERY

## 2021-06-14 PROCEDURE — 99999 PR OFFICE/OUTPT VISIT,PROCEDURE ONLY: CPT | Performed by: SURGERY

## 2021-06-14 NOTE — PROGRESS NOTES
General Surgery History and Physical    Patient's Name/Date of Birth: Danica Oh / 1960    Date: 6/14/2021    PCP: Eliana Norris DO    Referring Physician:   Artemio Santos*  [phone]    CHIEF COMPLAINT:    Chief Complaint   Patient presents with    New Patient    Colonoscopy     screening          HISTORY OF PRESENT ILLNESS:    Danica Oh is an 59 y.o. male who presents for a colonoscopy. The patient denies any symptoms. No nausea, vomiting, diarrhea, constipation. No changes in stool caliber. No bloody or black stools. No abdominal pain. No unintentional weight loss. No family history of colon cancer. The patient has a known history of: colon polyps. The patient has had a colonoscopy before - 9 years ago and he had a polyp removed. The patient said he has been on nexium for about 15 years. He said recently he has been needing to take a second nexium with certain foods. He had an EGD in 2012.      Past Medical History:   Past Medical History:   Diagnosis Date    Cardiac defibrillator in place     Cardiac sarcoidosis ([address]     Diverticulitis     Gout     HTN (hypertension)     Kidney stones     Neuroma of foot     PAC (premature atrial contraction)     Pulmonary sarcoidosis (HCC)     Squamous cell skin cancer     joslyn    Type 2 diabetes mellitus without complication (HCC)     VT (ventricular tachycardia) (HCC)         Past Surgical History:   Past Surgical History:   Procedure Laterality Date    BRONCHOSCOPY      CARDIAC DEFIBRILLATOR PLACEMENT  08/07/2008    CARDIAC DEFIBRILLATOR PLACEMENT  07/24/2013    D-ICD GENT CHANGE   ([facility])    CERVICAL DISC SURGERY      nov 21 2017, C5--7 disc placement  Dr. Ousmane Wells LITHOTRIPSY Right 1/25/2019    CYSTOSCOPY RETROGRADE PYELOGRAM  URETEROSCOPY LASER LITHOTRIPSY RIGHT J-STENT performed by Clinton Martinez MD at [place] [address]  07/24/2013    ICD genertor change        Allergies: Alcohol     Medications: Current Outpatient Medications   Medication Sig Dispense Refill    zinc gluconate 50 MG tablet Take 100 mg by mouth daily      lisinopril (PRINIVIL;ZESTRIL) 20 MG tablet Take 1 tablet by mouth daily 90 tablet 1    buPROPion (WELLBUTRIN XL) 150 MG extended release tablet Take 1 tablet by mouth every morning 90 tablet 1    pantoprazole (PROTONIX) 20 MG tablet Take 1 tablet by mouth daily 90 tablet 1    ibuprofen (ADVIL;MOTRIN) 800 MG tablet Take 1 tablet by mouth every 8 hours as needed for Pain With food 30 tablet 1    NIACIN PO Take 2 capsules by mouth daily      aspirin 81 MG tablet Take 81 mg by mouth daily      TURMERIC PO Take by mouth nightly        No current facility-administered medications for this visit. Social History:   Social History     Tobacco Use    Smoking status: Never Smoker    Smokeless tobacco: Never Used   Substance Use Topics    Alcohol use: Yes     Comment: rarely        Family History:   Family History   Problem Relation Age of Onset    Other Father         pneumonia    Prostate Cancer Father     Diabetes Sister     Other Brother         AML    Diabetes Brother        REVIEW OF SYSTEMS:    Constitutional: negative  Eyes: negative  Ears, nose, mouth, throat, and face: negative  Respiratory: negative  Cardiovascular: negative  Gastrointestinal: as in HPI  Genitourinary:negative  Integument/breast: negative  Hematologic/lymphatic: negative  Musculoskeletal:negative  Neurological: negative  Allergic/Immunologic: negative    PHYSICAL EXAM   /86   Pulse 63   Temp 98.2 °F (36.8 °C) (Temporal)   Resp 18   Ht 5' 10\" (1.778 m)   Wt 223 lb (101.2 kg)   BMI 32.00 kg/m²     General appearance: alert, cooperative and in no acute distress. Eyes: Grossly normal   Lungs: normal work of breathing  Heart: regular rate  Abdomen:  soft, non-tender, non-distended  Skin: No skin abnormalities  Neurologic: Alert and oriented x 3.  Grossly normal  Musculoskeletal: No edema.      ASSESSMENT AND PLAN:     Owen Archibald is an 59 y.o. male who presents for a heartburn, EGD colonoscopy for screening     I will set the patient up for an EGD and colonoscopy, possible biopsy, possible polypectomy. I explained the risks including but not limited to bleeding, perforation leading to possible surgery, or infection. The benefits, alternatives, and potential complications associated with the above procedure to be performed and transfusions when applicable with the patient/responsible person prior to the procedure. I discussed the risk of bowel peroration, postoperative bleeding, post-polypectomy syndrome, as well as the possibility of needing emergency surgery or another colonoscopy. All of the patient's questions were answered. The patient understands and agrees to the procedure.              Physician Signature: Electronically signed by Rob Meyer MD, General Surgery    Send copy of H&P to PCP, Bishop Joel DO and referring physician, Elva Rodgers*

## 2021-06-15 DIAGNOSIS — Z01.818 PREOPERATIVE CLEARANCE: Primary | ICD-10-CM

## 2021-06-17 ENCOUNTER — TELEPHONE (OUTPATIENT)
Dept: SURGERY | Age: 61
End: 2021-06-17

## 2021-06-17 NOTE — TELEPHONE ENCOUNTER
Prior Authorization Form:      DEMOGRAPHICS:                     Patient Name:  Ole Dodge  Patient :  1960            Insurance:  Payor: Aster Mcbride / Plan: [facility] SOLUTIONS / Product Type: *No Product type* /   Insurance ID Number:    Payor/Plan Subscr  Sex Relation Sub. Ins. ID Effective Group Num   1. [address] [facility] 1960 Male Self [id number]                                    [address] [id number]   2.  - [address] [facility] 1960 Male Self [id number]                                    [address]         DIAGNOSIS & PROCEDURE:                       Procedure/Operation: EGD  COLONOSCOPY           CPT Code:   64799   41252    Diagnosis:   HEARTBURN  SCREENING    ICD10 Code:   [address] [place]     Z12.11    Location:  [facility]    Surgeon:  Richard Graf INFORMATION:                          Date:   2021   Time:  10:00              Anesthesia:  MAC/TIVA                                                       Status:  Outpatient        Special Comments:         Electronically signed by Miguel Roberson MA on 2021 at 8:14 AM

## 2021-07-07 ENCOUNTER — HOSPITAL ENCOUNTER (OUTPATIENT)
Age: 61
Discharge: HOME | End: 2021-07-09
Payer: MEDICARE

## 2021-07-07 PROCEDURE — U0003 INFECTIOUS AGENT DETECTION BY NUCLEIC ACID (DNA OR RNA); SEVERE ACUTE RESPIRATORY SYNDROME CORONAVIRUS 2 (SARS-COV-2) (CORONAVIRUS DISEASE [COVID-19]), AMPLIFIED PROBE TECHNIQUE, MAKING USE OF HIGH THROUGHPUT TECHNOLOGIES AS DESCRIBED BY CMS-2020-01-R: HCPCS

## 2021-07-07 PROCEDURE — U0005 INFEC AGEN DETEC AMPLI PROBE: HCPCS

## 2021-07-07 NOTE — PROGRESS NOTES
Pt with history of Cardiac and Pulmonary Sarcoidosis and AICD. F/U with Dr. Jose Blue note in [address] from 4/5/21. AICD interrogation 5/2921. Pt denies any cardiac symptoms at this time.

## 2021-07-07 NOTE — PROGRESS NOTES
[facility] PRE-ADMISSION TESTING INSTRUCTIONS    The Preadmission Testing patient is instructed accordingly using the following criteria (check applicable):    ARRIVAL INSTRUCTIONS:  [x] Parking the day of Surgery is located in the Main Entrance lot. Upon entering the door, make an immediate right to the surgery reception desk    [x] Bring photo ID and insurance card    [] Bring in a copy of Living will or Durable Power of  papers. [x] Please be sure to arrange for responsible adult to provide transportation to and from the hospital    [] Please arrange for responsible adult to be with you for the 24 hour period post procedure due to having anesthesia      GENERAL INSTRUCTIONS:    [x] Nothing by mouth after midnight, including gum, candy, mints or water    [x] You may brush your teeth, but do not swallow any water    [x] Take medications as instructed with 1-2 oz of water    [x] Stop herbal supplements and vitamins 5 days prior to procedure    [x] Follow preop dosing of blood thinners per physician instructions    [] Take 1/2 dose of evening insulin, but no insulin after midnight    [] No oral diabetic medications after midnight    [] If diabetic and have low blood sugar or feel symptomatic, take 1-2oz apple juice only    [] Bring inhalers day of surgery    [] Bring C-PAP/ Bi-Pap day of surgery    [] Bring urine specimen day of surgery    [x] Shower or bath with soap, lather and rinse well, AM of Surgery, no lotion, powders or creams to surgical site    [x] Follow bowel prep as instructed per surgeon    [] No tobacco products within 24 hours of surgery     [] No alcohol or illegal drug use within 24 hours of surgery.     [x] Jewelry, body piercing's, eyeglasses, contact lenses and dentures are not permitted into surgery (bring cases)      [] Please do not wear any nail polish, make up or hair products on the day of surgery    [x] You can expect a call the business day prior to procedure to notify you if your arrival time changes    [] If you receive a survey after surgery we would greatly appreciate your comments    [] Parent/guardian of a minor must accompany their child and remain on the premises  the entire time they are under our care     [] Pediatric patients may bring favorite toy, blanket or comfort item with them    [] A caregiver or family member must remain with the patient during their stay if they are mentally handicapped, have dementia, disoriented or unable to use a call light or would be a safety concern if left unattended    [x] Please notify surgeon if you develop any illness between now and time of surgery (cold, cough, sore throat, fever, nausea, vomiting) or any signs of infections  including skin, wounds, and dental.    []  The Outpatient Pharmacy is available to fill your prescription here on your day of surgery, ask your preop nurse for details    [] Other instructions    EDUCATIONAL MATERIALS PROVIDED:    [] PAT Preoperative Education Packet/Booklet     [] Medication List    [] Transfusion bracelet applied with instructions    [] Shower with soap, lather and rinse well, and use CHG wipes provided the evening before surgery as instructed    [] Incentive spirometer with instructions        Have you been tested for COVID  Yes           Have you been told you were positive for COVID No  Have you had any known exposure to someone that is positive for COVID No  Do you have a cough                   No              Do you have shortness of breath No                 Do you have a sore throat            No                Are you having chills                    No                Are you having muscle aches. No                    Please come to the hospital wearing a mask and have your significant other wear a mask as well. Both of you should check your temperature before leaving to come here,  if it is 100 or higher please call [phone] for instruction.

## 2021-07-08 DIAGNOSIS — K21.00 GASTROESOPHAGEAL REFLUX DISEASE WITH ESOPHAGITIS: ICD-10-CM

## 2021-07-08 DIAGNOSIS — E78.5 HYPERLIPIDEMIA, UNSPECIFIED HYPERLIPIDEMIA TYPE: ICD-10-CM

## 2021-07-08 DIAGNOSIS — I10 ESSENTIAL HYPERTENSION: ICD-10-CM

## 2021-07-08 DIAGNOSIS — Z01.818 PREOP TESTING: ICD-10-CM

## 2021-07-08 DIAGNOSIS — F41.1 GAD (GENERALIZED ANXIETY DISORDER): ICD-10-CM

## 2021-07-09 LAB
SARS-COV-2: NOT DETECTED
SOURCE: NORMAL

## 2021-07-09 RX ORDER — BUPROPION HYDROCHLORIDE 150 MG/1
TABLET ORAL
Qty: 90 TABLET | Refills: 0 | Status: SHIPPED
Start: 2021-07-09 | End: 2021-10-01 | Stop reason: SDUPTHER

## 2021-07-09 RX ORDER — PANTOPRAZOLE SODIUM 20 MG/1
TABLET, DELAYED RELEASE ORAL
Qty: 90 TABLET | Refills: 0 | Status: SHIPPED
Start: 2021-07-09 | End: 2021-10-01 | Stop reason: SDUPTHER

## 2021-07-09 RX ORDER — LISINOPRIL 20 MG/1
TABLET ORAL
Qty: 90 TABLET | Refills: 0 | Status: SHIPPED
Start: 2021-07-09 | End: 2021-10-01 | Stop reason: SDUPTHER

## 2021-07-09 RX ORDER — ATORVASTATIN CALCIUM 10 MG/1
TABLET, FILM COATED ORAL
Qty: 90 TABLET | Refills: 0 | OUTPATIENT
Start: 2021-07-09

## 2021-07-12 ENCOUNTER — ANESTHESIA (OUTPATIENT)
Dept: ENDOSCOPY | Age: 61
End: 2021-07-12
Payer: MEDICARE

## 2021-07-12 ENCOUNTER — HOSPITAL ENCOUNTER (OUTPATIENT)
Age: 61
Setting detail: OUTPATIENT SURGERY
Discharge: HOME | End: 2021-07-12
Attending: SURGERY | Admitting: SURGERY
Payer: MEDICARE

## 2021-07-12 ENCOUNTER — ANESTHESIA EVENT (OUTPATIENT)
Dept: ENDOSCOPY | Age: 61
End: 2021-07-12
Payer: MEDICARE

## 2021-07-12 VITALS
RESPIRATION RATE: 14 BRPM | OXYGEN SATURATION: 99 % | DIASTOLIC BLOOD PRESSURE: 75 MMHG | SYSTOLIC BLOOD PRESSURE: 116 MMHG

## 2021-07-12 VITALS
SYSTOLIC BLOOD PRESSURE: 152 MMHG | WEIGHT: 215 LBS | TEMPERATURE: 97.6 F | DIASTOLIC BLOOD PRESSURE: 95 MMHG | HEART RATE: 55 BPM | BODY MASS INDEX: 30.78 KG/M2 | HEIGHT: 70 IN | OXYGEN SATURATION: 96 % | RESPIRATION RATE: 18 BRPM

## 2021-07-12 DIAGNOSIS — Z01.818 PREOP TESTING: Primary | ICD-10-CM

## 2021-07-12 PROCEDURE — 45380 COLONOSCOPY AND BIOPSY: CPT | Performed by: SURGERY

## 2021-07-12 PROCEDURE — 7100000011 HC PHASE II RECOVERY - ADDTL 15 MIN: Performed by: SURGERY

## 2021-07-12 PROCEDURE — 3609012400 HC EGD TRANSORAL BIOPSY SINGLE/MULTIPLE: Performed by: SURGERY

## 2021-07-12 PROCEDURE — 88305 TISSUE EXAM BY PATHOLOGIST: CPT

## 2021-07-12 PROCEDURE — 6360000002 HC RX W HCPCS: Performed by: NURSE ANESTHETIST, CERTIFIED REGISTERED

## 2021-07-12 PROCEDURE — 2709999900 HC NON-CHARGEABLE SUPPLY: Performed by: SURGERY

## 2021-07-12 PROCEDURE — 7100000010 HC PHASE II RECOVERY - FIRST 15 MIN: Performed by: SURGERY

## 2021-07-12 PROCEDURE — 3700000000 HC ANESTHESIA ATTENDED CARE: Performed by: SURGERY

## 2021-07-12 PROCEDURE — 43239 EGD BIOPSY SINGLE/MULTIPLE: CPT | Performed by: SURGERY

## 2021-07-12 PROCEDURE — 88342 IMHCHEM/IMCYTCHM 1ST ANTB: CPT

## 2021-07-12 PROCEDURE — 2580000003 HC RX 258: Performed by: NURSE ANESTHETIST, CERTIFIED REGISTERED

## 2021-07-12 PROCEDURE — 3700000001 HC ADD 15 MINUTES (ANESTHESIA): Performed by: SURGERY

## 2021-07-12 PROCEDURE — 2500000003 HC RX 250 WO HCPCS: Performed by: NURSE ANESTHETIST, CERTIFIED REGISTERED

## 2021-07-12 PROCEDURE — 3609010300 HC COLONOSCOPY W/BIOPSY SINGLE/MULTIPLE: Performed by: SURGERY

## 2021-07-12 RX ORDER — PROPOFOL 10 MG/ML
INJECTION, EMULSION INTRAVENOUS PRN
Status: DISCONTINUED | OUTPATIENT
Start: 2021-07-12 | End: 2021-07-12 | Stop reason: SDUPTHER

## 2021-07-12 RX ORDER — LABETALOL HYDROCHLORIDE 5 MG/ML
INJECTION, SOLUTION INTRAVENOUS PRN
Status: DISCONTINUED | OUTPATIENT
Start: 2021-07-12 | End: 2021-07-12 | Stop reason: SDUPTHER

## 2021-07-12 RX ORDER — SODIUM CHLORIDE 9 MG/ML
INJECTION, SOLUTION INTRAVENOUS CONTINUOUS PRN
Status: DISCONTINUED | OUTPATIENT
Start: 2021-07-12 | End: 2021-07-12 | Stop reason: SDUPTHER

## 2021-07-12 RX ORDER — LIDOCAINE HYDROCHLORIDE 10 MG/ML
INJECTION, SOLUTION INFILTRATION; PERINEURAL PRN
Status: DISCONTINUED | OUTPATIENT
Start: 2021-07-12 | End: 2021-07-12 | Stop reason: SDUPTHER

## 2021-07-12 RX ADMIN — PROPOFOL 30 MG: 10 INJECTION, EMULSION INTRAVENOUS at 10:26

## 2021-07-12 RX ADMIN — LIDOCAINE HYDROCHLORIDE 20 MG: 10 INJECTION, SOLUTION INFILTRATION; PERINEURAL at 10:02

## 2021-07-12 RX ADMIN — PROPOFOL 200 MG: 10 INJECTION, EMULSION INTRAVENOUS at 10:02

## 2021-07-12 RX ADMIN — PROPOFOL 50 MG: 10 INJECTION, EMULSION INTRAVENOUS at 10:13

## 2021-07-12 RX ADMIN — PROPOFOL 50 MG: 10 INJECTION, EMULSION INTRAVENOUS at 10:09

## 2021-07-12 RX ADMIN — PROPOFOL 50 MG: 10 INJECTION, EMULSION INTRAVENOUS at 10:20

## 2021-07-12 RX ADMIN — LABETALOL HYDROCHLORIDE 5 MG: 5 INJECTION INTRAVENOUS at 10:06

## 2021-07-12 RX ADMIN — SODIUM CHLORIDE: 9 INJECTION, SOLUTION INTRAVENOUS at 09:55

## 2021-07-12 ASSESSMENT — PAIN SCALES - GENERAL
PAINLEVEL_OUTOF10: 0
PAINLEVEL_OUTOF10: 0

## 2021-07-12 ASSESSMENT — PAIN DESCRIPTION - PAIN TYPE
TYPE: SURGICAL PAIN
TYPE: SURGICAL PAIN

## 2021-07-12 NOTE — ANESTHESIA PRE PROCEDURE
Department of Anesthesiology  Preprocedure Note       Name:  Tommy Dozier   Age:  59 y.o.  :  1960                                          MRN:  [id number]         Date:  2021      Surgeon: Rosita Banks):  Debbie De Leon MD    Procedure: COLORECTAL CANCER SCREENING, NOT HIGH RISK (N/A )  EGD ESOPHAGOGASTRODUODENOSCOPY (N/A )    Medications prior to admission:   Prior to Admission medications    Medication Sig Start Date End Date Taking? Authorizing Provider   pantoprazole (PROTONIX) 20 MG tablet TAKE ONE TABLET BY MOUTH DAILY 21   Liban Berger, DO   lisinopril (PRINIVIL;ZESTRIL) 20 MG tablet TAKE ONE TABLET BY MOUTH DAILY 21   Liban Berger, DO   buPROPion (WELLBUTRIN XL) 150 MG extended release tablet TAKE ONE TABLET BY MOUTH EVERY MORNING 21   Colie Brett Ab, DO   zinc gluconate 50 MG tablet Take 100 mg by mouth daily    Historical Provider, MD   ibuprofen (ADVIL;MOTRIN) 800 MG tablet Take 1 tablet by mouth every 8 hours as needed for Pain With food 5/15/20   Liban Berger,    NIACIN PO Take 2 capsules by mouth daily    Historical Provider, MD   aspirin 81 MG tablet Take 81 mg by mouth daily    Historical Provider, MD   TURMERIC PO Take by mouth nightly     Historical Provider, MD       Current medications:    No current outpatient medications on file. No current facility-administered medications for this visit. Allergies: Allergies   Allergen Reactions    Alcohol Other (See Comments)     Rum causes stomach pain and cramping.           Problem List:    Patient Active Problem List   Diagnosis Code    PAC (premature atrial contraction) I49.1    Sarcoidosis D86.9    Syncope R55    HTN (hypertension) I10    Dual implantable cardioverter-defibrillator in situ Z95.810    Hyperlipemia E78.5    Degenerative disc disease, cervical M50.30    Ureteral calculus, right N20.1    Gastroesophageal reflux disease with esophagitis K21.00    Type 2 diabetes mellitus without complication, without long-term current use of insulin (HCC) E11.9       Past Medical History:        Diagnosis Date    Cardiac defibrillator in place     Cardiac sarcoidosis ([address]     Diverticulitis     Encounter for screening colonoscopy     7/12/21    Gout     HTN (hypertension)     Neuroma of foot     PAC (premature atrial contraction)     Pulmonary sarcoidosis (HCC)     Squamous cell skin cancer     joslyn    VT (ventricular tachycardia) (HCC)        Past Surgical History:        Procedure Laterality Date    BRONCHOSCOPY      CARDIAC DEFIBRILLATOR PLACEMENT  08/07/2008    CARDIAC DEFIBRILLATOR PLACEMENT  07/24/2013    D-ICD GENT CHANGE   ([facility])    CERVICAL DISC SURGERY      nov 21 2017, C5--7 disc placement  Dr. Austin Harris LITHOTRIPSY Right 1/25/2019    CYSTOSCOPY RETROGRADE PYELOGRAM  URETEROSCOPY LASER LITHOTRIPSY RIGHT J-STENT performed by Yessica Denise MD at [address] [facility] [address]  07/24/2013    ICD genertor change       Social History:    Social History     Tobacco Use    Smoking status: Never Smoker    Smokeless tobacco: Never Used   Substance Use Topics    Alcohol use: Yes     Comment: rarely                                Counseling given: Not Answered      Vital Signs (Current): There were no vitals filed for this visit.                                            BP Readings from Last 3 Encounters:   06/14/21 132/86   05/28/21 120/80   04/05/21 128/76       NPO Status:                                                                                 BMI:   Wt Readings from Last 3 Encounters:   07/07/21 215 lb (97.5 kg)   06/14/21 223 lb (101.2 kg)   05/28/21 227 lb (103 kg)     There is no height or weight on file to calculate BMI.    CBC:   Lab Results   Component Value Date    WBC 5.7 12/16/2020    RBC 4.99 12/16/2020    HGB 14.2 12/16/2020    HCT 43.5 12/16/2020    MCV 87.2 12/16/2020    RDW 13.2 12/16/2020     12/16/2020 CMP:   Lab Results   Component Value Date     12/16/2020    K 4.8 12/16/2020     12/16/2020    CO2 22 12/16/2020    BUN 15 12/16/2020    CREATININE 1.0 12/16/2020    GFRAA >60 12/16/2020    LABGLOM >60 12/16/2020    GLUCOSE 90 12/16/2020    PROT 6.8 12/16/2020    CALCIUM 9.6 12/16/2020    BILITOT 0.5 12/16/2020    ALKPHOS 88 12/16/2020    AST 31 12/16/2020    ALT 32 12/16/2020       POC Tests: No results for input(s): POCGLU, POCNA, POCK, POCCL, POCBUN, POCHEMO, POCHCT in the last 72 hours. Coags:   Lab Results   Component Value Date    PROTIME 10.5 02/07/2017    INR 1.0 02/07/2017    APTT 25.6 09/15/2016       HCG (If Applicable): No results found for: PREGTESTUR, PREGSERUM, HCG, HCGQUANT     ABGs: No results found for: PHART, PO2ART, GKL4ROH, KTC8VQD, BEART, W0JGIIGD     Type & Screen (If Applicable):  No results found for: [facility]    Anesthesia Evaluation  Patient summary reviewed and Nursing notes reviewed no history of anesthetic complications:   Airway: Mallampati: II  TM distance: >3 FB   Neck ROM: full  Mouth opening: > = 3 FB Dental:      Comment: None loose    Pulmonary: breath sounds clear to auscultation                            ROS comment: Sarcoidosis of the lung   Cardiovascular:    (+) hypertension:, pacemaker: AICD, dysrhythmias: ventricular tachycardia, hyperlipidemia      ECG reviewed  Rhythm: regular  Rate: normal  Echocardiogram reviewed               ROS comment: Cardiac sarcoidosis ([facility] [address]       Neuro/Psych:                ROS comment: Degenerative disc disease, cervical GI/Hepatic/Renal:   (+) renal disease: kidney stones,          ROS comment: Kidney syone  Hydronephrosis. Endo/Other:        (-) diabetes mellitus                ROS comment:  Abdominal:             Vascular: negative vascular ROS.          Other Findings:                Initial report created on 1/24/2019 12:45:26 AM EST   EXAM:     CT Abdomen and Pelvis With Contrast        EXAM DATE/TIME: 1/23/2019 10:15 PM        CLINICAL HISTORY:     62years old, male; Pain; Abdominal pain; Additional info: Right groin pain        TECHNIQUE:     Axial computed tomography images of the abdomen and pelvis with intravenous    contrast.     All CT scans at this facility use at least one of these dose optimization    techniques: automated exposure control; mA and/or kV adjustment per patient    size (includes targeted exams where dose is matched to clinical indication); or    iterative reconstruction. Coronal and sagittal reformatted images were created and reviewed. CONTRAST:     110 ml of [id number] administered intravenously. COMPARISON:     CT ABDOMEN PELVIS WO CONTRAST 1/18/2019 6:47 PM        FINDINGS:     Lower thorax: The lung bases are clean. ABDOMEN:     Liver: Normal. No mass. Gallbladder and bile ducts: Normal. No calcified stones. No ductal dilation. Pancreas: Normal. No ductal dilation. Spleen: The spleen is normal.     Adrenals: Normal. No mass. Kidneys and ureters: There is an 8 x 4 mm calculus in the right ureter    approximately 7 centimeters distal to the UPJ. There is mild/moderate proximal    hydronephrosis and ureterectasis. There are at least 3 additional    nonobstructing right renal stones. There is no obstructive uropathy on the    left. The presence or absence of stones on the left is difficult to determine    because this study is contrast-enhanced. Stomach and bowel: There is no acute GI tract pathology. Appendix:  The appendix is normal.        PELVIS:     Bladder:  Bladder is normal.     Reproductive:  No prostate enlargement. ABDOMEN and PELVIS:     Intraperitoneal space: There is no ascites or free intraperitoneal air. Bones/joints: No acute osseous findings. Soft tissues: Unremarkable. Vasculature: The aorta and IVC are normal.     Lymph nodes: Normal. No enlarged lymph nodes.             Impression   1.  8 x 4 mm obstructing calculus in the proximal right ureter. Mild to    moderate proximal hydronephrosis. There are at least 3 additional    nonobstructing right renal stones. 2.  No obstructive uropathy on the left. This report has been electronically signed by Lissette Butler MD.               Anesthesia Plan      MAC     ASA 4       Induction: intravenous. MIPS: Prophylactic antiemetics administered. Anesthetic plan and risks discussed with patient and spouse. Plan discussed with CRNA.                   Ying Pimentel MD   7/12/2021

## 2021-07-12 NOTE — ANESTHESIA POSTPROCEDURE EVALUATION
Department of Anesthesiology  Postprocedure Note    Patient: Kylee Bullard  MRN: [id number]  YOB: 1960  Date of evaluation: 7/12/2021  Time:  3:06 PM     Procedure Summary     Date: 07/12/21 Room / Location: [facility] / [facility]    Anesthesia Start: 6718 Anesthesia Stop: 1030    Procedures:       COLONOSCOPY WITH BIOPSY (N/A )      EGD BIOPSY (N/A ) Diagnosis: (HEART BURN SCREENING)    Surgeons: Lincoln Victoria MD Responsible Provider: Alva Taylor MD    Anesthesia Type: MAC ASA Status: 4          Anesthesia Type: MAC    Aicha Phase I: Aicha Score: 10    Aicha Phase II: Aicha Score: 10    Last vitals: Reviewed and per EMR flowsheets.        Anesthesia Post Evaluation    Patient location during evaluation: PACU  Patient participation: complete - patient participated  Level of consciousness: awake and alert  Airway patency: patent  Nausea & Vomiting: no vomiting and no nausea  Complications: no  Cardiovascular status: hemodynamically stable  Respiratory status: acceptable  Hydration status: stable

## 2021-07-12 NOTE — OP NOTE
Operative Note: EGD and Colonoscopy    Patti Ureña     DATE OF PROCEDURE: 7/12/2021  SURGEON: Dr. Troy Lu MD, M.D. PREOPERATIVE DIAGNOSES:   Heartburn     Screening colonoscopy    POSTOPERATIVE DIAGNOSES:   Moderate gastroduodenitis  GERD     Cecal polyp  Mild sigmoid diverticulosis    OPERATION:    EGD esophagogastroduodenoscopy With duodenal, antral, distal esophageal biopsies                   Colonoscopy to the cecum with forceps polypectomy    SPECIMENS:  ID Type Source Tests Collected by Time Destination   A : r/o celiac Tissue Duodenum SURGICAL PATHOLOGY Sarah Jara MD 7/12/2021 1015    B : antral biopsy r/o hpylori Tissue Stomach SURGICAL PATHOLOGY Sarah Jara MD 7/12/2021 1016    C : Distal esophagus biopsy r/o esophagitis Tissue Esophagus SURGICAL PATHOLOGY Sarah Jara MD 7/12/2021 1016    D : cecal polyp biopsy Tissue Colon SURGICAL PATHOLOGY Sarah Jara MD 7/12/2021 1027        BLOOD LOSS: Minimal    ANESTHESIA: LMAC    CONSENT AND INDICATIONS:  This is a 59y.o. year old male who is having the above issues. I have discussed with the patient and/or the patient representative the indication, alternatives, and the possible risks and/or complications of the planned procedure and the anesthesia methods. The patient and/or patient representative appear to understand and agree to proceed. OPERATIONS: The patient was placed on the table and sedated via LMAC. Bite block was placed. A lubricated scope was easily passed into the upper esophagus which looked normal. The distal esophagus looked abnormal: GERD and biopsies were taken . The gastroesophageal junction was at 45 cm. The scope was passed into the stomach and retroflexed. There was no hiatal hernia. The scope was passed down toward the pylorus. The antral mucosa all looked abnormal: moderate gastritis. Biopsy was taken to check for H. pylori.  The scope was then passed through the pylorus into the duodenal bulb which looked abnormal: moderate duodenitis, then around to the distal duodenum which looked normal and biopsies were taken, and the scope was then withdrawn. The patient was then placed in left lateral decubitus position. A rectal exam was done and no masses were felt. A lubricated scope was passed into the rectum which looked normal.  The scope was passed all the way around to the cecum. Mild sigmoid diverticuosis was found. The bowel prep was clear. The TI and appendiceal orifice were identified. There was a small cecal polyp that was removed via forceps polypectomy. The scope was then slowly withdrawn, each area was examined again on the way out. The scope was retroflexed in the rectum and it was normal. The patient tolerated the procedure well. PLAN: Follow up biopsies. Continue Protonix    Repeat colonoscopy in 5 years. Physician Signature: Electronically signed by Dr. Charmaine Rubinstein copy of H&P to PCP, Aminata Jenkins DO and referring physician, No ref.  provider found

## 2021-07-12 NOTE — H&P
Patient's office history and physical was reviewed. Patient examined. There has been no change in the patient's history and physical.      Physician Signature: Electronically signed by Dr. Berenice Ornelas Surgery History and Physical    Patient's Name/Date of Birth: Tom Ryan / 1960    Date: 6/17/2021    PCP: Troy Harris DO    Referring Physician:   No ref. provider found  N/A    CHIEF COMPLAINT:    No chief complaint on file. HISTORY OF PRESENT ILLNESS:    Tom Ryan is an 59 y.o. male who presents for a colonoscopy. The patient denies any symptoms. No nausea, vomiting, diarrhea, constipation. No changes in stool caliber. No bloody or black stools. No abdominal pain. No unintentional weight loss. No family history of colon cancer. The patient has a known history of: colon polyps. The patient has had a colonoscopy before - 9 years ago and he had a polyp removed. The patient said he has been on nexium for about 15 years. He said recently he has been needing to take a second nexium with certain foods. He had an EGD in 2012.      Past Medical History:   Past Medical History:   Diagnosis Date    Cardiac defibrillator in place     Cardiac sarcoidosis ([facility] [address]     Diverticulitis     Encounter for screening colonoscopy     7/12/21    Gout     HTN (hypertension)     Neuroma of foot     PAC (premature atrial contraction)     Pulmonary sarcoidosis (HCC)     Squamous cell skin cancer     joslyn    VT (ventricular tachycardia) (HCC)         Past Surgical History:   Past Surgical History:   Procedure Laterality Date    BRONCHOSCOPY      CARDIAC DEFIBRILLATOR PLACEMENT  08/07/2008    CARDIAC DEFIBRILLATOR PLACEMENT  07/24/2013    D-ICD GENT CHANGE   ([facility])    CERVICAL DISC SURGERY      nov 21 2017, C5--7 disc placement  Dr. Austin Harris LITHOTRIPSY Right 1/25/2019    CYSTOSCOPY RETROGRADE PYELOGRAM  URETEROSCOPY LASER LITHOTRIPSY RIGHT J-STENT performed by Shira Mclaughlin MD Gurinder [facility] 6  07/24/2013    ICD genertor change        Allergies: Alcohol     Medications:   No current facility-administered medications for this encounter. Facility-Administered Medications Ordered in Other Encounters   Medication Dose Route Frequency Provider Last Rate Last Admin    0.9 % sodium chloride infusion   Intravenous Continuous PRN MARY aBcon - CRNA   New Bag at 07/12/21 6946         Social History:   Social History     Tobacco Use    Smoking status: Never Smoker    Smokeless tobacco: Never Used   Substance Use Topics    Alcohol use: Yes     Comment: rarely        Family History:   Family History   Problem Relation Age of Onset    Other Father         pneumonia    Prostate Cancer Father     Diabetes Sister     Other Brother         AML    Diabetes Brother        REVIEW OF SYSTEMS:    Constitutional: negative  Eyes: negative  Ears, nose, mouth, throat, and face: negative  Respiratory: negative  Cardiovascular: negative  Gastrointestinal: as in HPI  Genitourinary:negative  Integument/breast: negative  Hematologic/lymphatic: negative  Musculoskeletal:negative  Neurological: negative  Allergic/Immunologic: negative    PHYSICAL EXAM   Ht 5' 10\" (1.778 m)   Wt 215 lb (97.5 kg)   BMI 30.85 kg/m²     General appearance: alert, cooperative and in no acute distress. Eyes: Grossly normal   Lungs: normal work of breathing  Heart: regular rate  Abdomen:  soft, non-tender, non-distended  Skin: No skin abnormalities  Neurologic: Alert and oriented x 3. Grossly normal  Musculoskeletal: No edema. ASSESSMENT AND PLAN:     Norma Johnson is an 59 y.o. male who presents for a heartburn, EGD colonoscopy for screening     I will set the patient up for an EGD and colonoscopy, possible biopsy, possible polypectomy. I explained the risks including but not limited to bleeding, perforation leading to possible surgery, or infection.  The benefits, alternatives, and potential complications associated with the above procedure to be performed and transfusions when applicable with the patient/responsible person prior to the procedure. I discussed the risk of bowel peroration, postoperative bleeding, post-polypectomy syndrome, as well as the possibility of needing emergency surgery or another colonoscopy. All of the patient's questions were answered. The patient understands and agrees to the procedure. Physician Signature: Electronically signed by Tone Sheriff MD, General Surgery    Send copy of H&P to PCP, Raina Wang DO and referring physician, No ref.  provider found

## 2021-07-26 ENCOUNTER — OFFICE VISIT (OUTPATIENT)
Dept: SURGERY | Age: 61
End: 2021-07-26
Payer: MEDICARE

## 2021-07-26 VITALS
DIASTOLIC BLOOD PRESSURE: 92 MMHG | WEIGHT: 224 LBS | RESPIRATION RATE: 16 BRPM | SYSTOLIC BLOOD PRESSURE: 139 MMHG | BODY MASS INDEX: 32.07 KG/M2 | HEART RATE: 64 BPM | HEIGHT: 70 IN

## 2021-07-26 DIAGNOSIS — K21.00 GASTROESOPHAGEAL REFLUX DISEASE WITH ESOPHAGITIS WITHOUT HEMORRHAGE: ICD-10-CM

## 2021-07-26 DIAGNOSIS — D12.6 TUBULAR ADENOMA OF COLON: Primary | ICD-10-CM

## 2021-07-26 PROCEDURE — 1036F TOBACCO NON-USER: CPT | Performed by: SURGERY

## 2021-07-26 PROCEDURE — G8417 CALC BMI ABV UP PARAM F/U: HCPCS | Performed by: SURGERY

## 2021-07-26 PROCEDURE — 3017F COLORECTAL CA SCREEN DOC REV: CPT | Performed by: SURGERY

## 2021-07-26 PROCEDURE — 99212 OFFICE O/P EST SF 10 MIN: CPT | Performed by: SURGERY

## 2021-07-26 PROCEDURE — G8427 DOCREV CUR MEDS BY ELIG CLIN: HCPCS | Performed by: SURGERY

## 2021-07-26 NOTE — PROGRESS NOTES
Progress Note - Follow up    Patient's Name/Date of Birth: Mary Barboza / 1960    Date: 7/26/2021    PCP: Xenia Knight DO    Referring Physician:   No ref. provider found  N/A    Chief Complaint   Patient presents with    Results     colonoscopy       HPI:    The patient is doing well. He has been on protonix for years and gets good releif from it. He said sometimes he has breakthrough symptoms but it is when he eats the wrong thing and he takes Pepto bismol with good relief. Patient's medications, allergies, past medical, surgical, social and family histories were reviewed and updated as appropriate. Review of Systems  Constitutional: negative  Respiratory: negative  Cardiovascular: negative  Gastrointestinal: as in hpi  Genitourinary:negative  Integument/breast: negative    Physical Exam:  BP (!) 139/92   Pulse 64   Resp 16   Ht 5' 10\" (1.778 m)   Wt 224 lb (101.6 kg)   BMI 32.14 kg/m²   General appearance: alert, cooperative and in no acute distress. Lungs: normal work of breathing  Heart: regular rate  Abdomen: soft, nontender, nondistended  Musculoskeletal: symmetrical without edema. Skin: marquis     Data Reviewed:   Pathology: Diagnosis:   A.  Duodenum, biopsy: No pathologic alterations     B.  Stomach, biopsy: Mild chronic gastritis; negative for intestinal   metaplasia   Immunostain negative for Helicobacter pylori organisms     C.  Distal esophagus, biopsy: Squamous mucosa with features of mild   reflux esophagitis     D.  Cecum, biopsy: Tubular adenoma     Impression/Plan:  59y.o. year old male with GERD with esophagitis, mild chronic gastritis      Continue Protonix     Tubular adenoma - Repeat colonoscopy in 5 years. Electronically by Melva Webber MD, General Surgery  on 7/26/2021 at 3:44 PM      Send copy of H&P to PCP, Xenia Knight DO and referring physician, No ref.  provider found      7/26/2021

## 2021-09-08 ENCOUNTER — OFFICE VISIT (OUTPATIENT)
Dept: PRIMARY CARE CLINIC | Age: 61
End: 2021-09-08
Payer: MEDICARE

## 2021-09-08 VITALS
TEMPERATURE: 96.8 F | BODY MASS INDEX: 32.35 KG/M2 | WEIGHT: 226 LBS | OXYGEN SATURATION: 97 % | DIASTOLIC BLOOD PRESSURE: 80 MMHG | HEIGHT: 70 IN | RESPIRATION RATE: 16 BRPM | SYSTOLIC BLOOD PRESSURE: 130 MMHG | HEART RATE: 57 BPM

## 2021-09-08 DIAGNOSIS — I10 ESSENTIAL HYPERTENSION: Primary | ICD-10-CM

## 2021-09-08 DIAGNOSIS — S56.911A STRAIN OF RIGHT FOREARM, INITIAL ENCOUNTER: ICD-10-CM

## 2021-09-08 DIAGNOSIS — E11.9 TYPE 2 DIABETES MELLITUS WITHOUT COMPLICATION, WITHOUT LONG-TERM CURRENT USE OF INSULIN (HCC): ICD-10-CM

## 2021-09-08 DIAGNOSIS — Z23 NEED FOR INFLUENZA VACCINATION: ICD-10-CM

## 2021-09-08 DIAGNOSIS — M79.604 PAIN OF RIGHT LOWER EXTREMITY: ICD-10-CM

## 2021-09-08 PROCEDURE — 90674 CCIIV4 VAC NO PRSV 0.5 ML IM: CPT | Performed by: FAMILY MEDICINE

## 2021-09-08 PROCEDURE — 2022F DILAT RTA XM EVC RTNOPTHY: CPT | Performed by: FAMILY MEDICINE

## 2021-09-08 PROCEDURE — 3044F HG A1C LEVEL LT 7.0%: CPT | Performed by: FAMILY MEDICINE

## 2021-09-08 PROCEDURE — 1036F TOBACCO NON-USER: CPT | Performed by: FAMILY MEDICINE

## 2021-09-08 PROCEDURE — 3017F COLORECTAL CA SCREEN DOC REV: CPT | Performed by: FAMILY MEDICINE

## 2021-09-08 PROCEDURE — G8427 DOCREV CUR MEDS BY ELIG CLIN: HCPCS | Performed by: FAMILY MEDICINE

## 2021-09-08 PROCEDURE — G0008 ADMIN INFLUENZA VIRUS VAC: HCPCS | Performed by: FAMILY MEDICINE

## 2021-09-08 PROCEDURE — 99214 OFFICE O/P EST MOD 30 MIN: CPT | Performed by: FAMILY MEDICINE

## 2021-09-08 PROCEDURE — G8417 CALC BMI ABV UP PARAM F/U: HCPCS | Performed by: FAMILY MEDICINE

## 2021-09-08 SDOH — ECONOMIC STABILITY: FOOD INSECURITY: WITHIN THE PAST 12 MONTHS, THE FOOD YOU BOUGHT JUST DIDN'T LAST AND YOU DIDN'T HAVE MONEY TO GET MORE.: NEVER TRUE

## 2021-09-08 SDOH — ECONOMIC STABILITY: FOOD INSECURITY: WITHIN THE PAST 12 MONTHS, YOU WORRIED THAT YOUR FOOD WOULD RUN OUT BEFORE YOU GOT MONEY TO BUY MORE.: NEVER TRUE

## 2021-09-08 ASSESSMENT — ENCOUNTER SYMPTOMS
CONSTIPATION: 0
DIARRHEA: 0
BLOOD IN STOOL: 0
SHORTNESS OF BREATH: 0
BLURRED VISION: 0
VISUAL CHANGE: 0

## 2021-09-08 ASSESSMENT — SOCIAL DETERMINANTS OF HEALTH (SDOH): HOW HARD IS IT FOR YOU TO PAY FOR THE VERY BASICS LIKE FOOD, HOUSING, MEDICAL CARE, AND HEATING?: NOT HARD AT ALL

## 2021-09-08 NOTE — PROGRESS NOTES
Norma Johnson, a male of 59 y.o. came to the office 9/8/2021. Patient Active Problem List   Diagnosis    PAC (premature atrial contraction)    Sarcoidosis    Syncope    HTN (hypertension)    Dual implantable cardioverter-defibrillator in situ    Hyperlipemia    Degenerative disc disease, cervical    Ureteral calculus, right    Gastroesophageal reflux disease with esophagitis    Type 2 diabetes mellitus without complication, without long-term current use of insulin (HCC)          Leg Pain   There was no injury mechanism (but was doing heavy lifting and pushing a rocket ship and artifacts from [employer]. ). The pain is present in the right leg. The quality of the pain is described as aching. Pain course: resolved. Pertinent negatives include no numbness or tingling. He has tried nothing for the symptoms. Arm Pain   There was no injury mechanism. The pain is present in the right forearm. The quality of the pain is described as aching. The pain does not radiate. The pain is mild. The pain has been improving since the incident. Pertinent negatives include no chest pain, numbness or tingling. The symptoms are aggravated by lifting (pushing and squeezing.). He has tried nothing for the symptoms. Hypertension  This is a chronic problem. The current episode started more than 1 year ago. The problem is controlled. Pertinent negatives include no blurred vision, chest pain, headaches, malaise/fatigue, palpitations, peripheral edema or shortness of breath. Diabetes  He presents for his follow-up diabetic visit. He has type 2 diabetes mellitus. His disease course has been stable. Pertinent negatives for hypoglycemia include no headaches. Associated symptoms include polydipsia. Pertinent negatives for diabetes include no blurred vision, no chest pain, no foot paresthesias, no polyuria and no visual change. Current diabetic treatment includes diet. He is compliant with treatment most of the time. Allergies   Allergen Reactions    Alcohol Other (See Comments)     Rum causes stomach pain and cramping. Current Outpatient Medications on File Prior to Visit   Medication Sig Dispense Refill    pantoprazole (PROTONIX) 20 MG tablet TAKE ONE TABLET BY MOUTH DAILY 90 tablet 0    lisinopril (PRINIVIL;ZESTRIL) 20 MG tablet TAKE ONE TABLET BY MOUTH DAILY 90 tablet 0    buPROPion (WELLBUTRIN XL) 150 MG extended release tablet TAKE ONE TABLET BY MOUTH EVERY MORNING 90 tablet 0    zinc gluconate 50 MG tablet Take 100 mg by mouth daily      ibuprofen (ADVIL;MOTRIN) 800 MG tablet Take 1 tablet by mouth every 8 hours as needed for Pain With food 30 tablet 1    NIACIN PO Take 2 capsules by mouth daily      aspirin 81 MG tablet Take 81 mg by mouth daily      TURMERIC PO Take by mouth nightly        No current facility-administered medications on file prior to visit. Review of Systems   Constitutional: Negative for malaise/fatigue. Eyes: Negative for blurred vision. Respiratory: Negative for shortness of breath. Cardiovascular: Negative for chest pain, palpitations and leg swelling. Gastrointestinal: Negative for blood in stool, constipation and diarrhea. Endocrine: Positive for polydipsia. Negative for polyuria. Musculoskeletal:        See hpi     Neurological: Negative for tingling, numbness and headaches. other review of systems reviewed and are negative    OBJECTIVE:  /80   Pulse 57   Temp 96.8 °F (36 °C)   Resp 16   Ht 5' 10\" (1.778 m)   Wt 226 lb (102.5 kg)   SpO2 97%   BMI 32.43 kg/m²      Physical Exam  Vitals reviewed. Eyes:      General: No scleral icterus. Conjunctiva/sclera: Conjunctivae normal.   Neck:      Thyroid: No thyromegaly. Vascular: No carotid bruit. Cardiovascular:      Rate and Rhythm: Normal rate and regular rhythm. Heart sounds: No murmur heard.      Pulmonary:      Effort: Pulmonary effort is normal.      Breath sounds: Normal breath sounds. No wheezing or rales. Abdominal:      General: Bowel sounds are normal.      Palpations: Abdomen is soft. There is no mass. Tenderness: There is no abdominal tenderness. There is no guarding or rebound. Musculoskeletal:         General: Normal range of motion. Right forearm: Tenderness (slight in brachioradialis. Pain with pronation against resistance. ) present. Cervical back: Neck supple. Right lower leg: No deformity. Lymphadenopathy:      Cervical: No cervical adenopathy. Skin:     General: Skin is warm and dry. Neurological:      Mental Status: He is alert and oriented to person, place, and time. Psychiatric:         Mood and Affect: Mood normal.         ASSESSMENT AND PLAN:    Victorino Keyes was seen today for 6 month follow-up, leg pain, numbness and arm pain. Diagnoses and all orders for this visit:    Essential hypertension    Type 2 diabetes mellitus without complication, without long-term current use of insulin (HCC)  -     Comprehensive Metabolic Panel; Future  -     Hemoglobin A1C; Future  -     Lipid Panel; Future    Need for influenza vaccination  -     INFLUENZA, MDCK QUADV, 2 YRS AND OLDER, IM, PF, PREFILL SYR OR SDV, 0.5ML (FLUCELVAX QUADV, PF)    Strain of right forearm, initial encounter    Pain of right lower extremity    - bp stable stay on med  - low carb diet. - advil prn pain. - recommend Covid vaccine. - leg pain due to recent activity. Return in about 6 months (around 3/8/2022) for medicare pe, or for acute problem.     Kavin Berger, DO

## 2022-05-15 DIAGNOSIS — K21.00 GASTROESOPHAGEAL REFLUX DISEASE WITH ESOPHAGITIS: ICD-10-CM

## 2022-05-15 DIAGNOSIS — F41.1 GAD (GENERALIZED ANXIETY DISORDER): ICD-10-CM

## 2022-05-15 DIAGNOSIS — I10 ESSENTIAL HYPERTENSION: ICD-10-CM

## 2022-05-16 RX ORDER — LISINOPRIL 20 MG/1
20 TABLET ORAL DAILY
Qty: 90 TABLET | Refills: 1 | OUTPATIENT
Start: 2022-05-16

## 2022-05-16 RX ORDER — BUPROPION HYDROCHLORIDE 150 MG/1
150 TABLET ORAL EVERY MORNING
Qty: 90 TABLET | Refills: 1 | OUTPATIENT
Start: 2022-05-16

## 2022-05-16 RX ORDER — PANTOPRAZOLE SODIUM 20 MG/1
20 TABLET, DELAYED RELEASE ORAL DAILY
Qty: 90 TABLET | Refills: 1 | OUTPATIENT
Start: 2022-05-16

## 2022-05-18 ENCOUNTER — OFFICE VISIT (OUTPATIENT)
Dept: PRIMARY CARE CLINIC | Age: 62
End: 2022-05-18
Payer: MEDICARE

## 2022-05-18 VITALS
TEMPERATURE: 97.2 F | WEIGHT: 230 LBS | SYSTOLIC BLOOD PRESSURE: 120 MMHG | DIASTOLIC BLOOD PRESSURE: 80 MMHG | OXYGEN SATURATION: 98 % | HEART RATE: 67 BPM | BODY MASS INDEX: 33 KG/M2

## 2022-05-18 DIAGNOSIS — I10 ESSENTIAL HYPERTENSION: ICD-10-CM

## 2022-05-18 DIAGNOSIS — F41.1 GAD (GENERALIZED ANXIETY DISORDER): ICD-10-CM

## 2022-05-18 DIAGNOSIS — E11.9 TYPE 2 DIABETES MELLITUS WITHOUT COMPLICATION, WITHOUT LONG-TERM CURRENT USE OF INSULIN (HCC): Primary | ICD-10-CM

## 2022-05-18 DIAGNOSIS — M53.3 SACROILIAC JOINT DYSFUNCTION OF LEFT SIDE: ICD-10-CM

## 2022-05-18 PROCEDURE — G8417 CALC BMI ABV UP PARAM F/U: HCPCS | Performed by: FAMILY MEDICINE

## 2022-05-18 PROCEDURE — 3046F HEMOGLOBIN A1C LEVEL >9.0%: CPT | Performed by: FAMILY MEDICINE

## 2022-05-18 PROCEDURE — 3017F COLORECTAL CA SCREEN DOC REV: CPT | Performed by: FAMILY MEDICINE

## 2022-05-18 PROCEDURE — 2022F DILAT RTA XM EVC RTNOPTHY: CPT | Performed by: FAMILY MEDICINE

## 2022-05-18 PROCEDURE — G8427 DOCREV CUR MEDS BY ELIG CLIN: HCPCS | Performed by: FAMILY MEDICINE

## 2022-05-18 PROCEDURE — 99214 OFFICE O/P EST MOD 30 MIN: CPT | Performed by: FAMILY MEDICINE

## 2022-05-18 PROCEDURE — 1036F TOBACCO NON-USER: CPT | Performed by: FAMILY MEDICINE

## 2022-05-18 RX ORDER — PANTOPRAZOLE SODIUM 20 MG/1
20 TABLET, DELAYED RELEASE ORAL DAILY
Qty: 90 TABLET | Refills: 1 | Status: SHIPPED | OUTPATIENT
Start: 2022-05-18

## 2022-05-18 RX ORDER — LISINOPRIL 20 MG/1
20 TABLET ORAL DAILY
Qty: 90 TABLET | Refills: 1 | Status: SHIPPED | OUTPATIENT
Start: 2022-05-18

## 2022-05-18 RX ORDER — BUPROPION HYDROCHLORIDE 150 MG/1
150 TABLET ORAL EVERY MORNING
Qty: 90 TABLET | Refills: 1 | Status: CANCELLED | OUTPATIENT
Start: 2022-05-18

## 2022-05-18 ASSESSMENT — ENCOUNTER SYMPTOMS
BACK PAIN: 1
CONSTIPATION: 0
DIARRHEA: 0
VISUAL CHANGE: 0
SHORTNESS OF BREATH: 0

## 2022-05-18 ASSESSMENT — PATIENT HEALTH QUESTIONNAIRE - PHQ9
2. FEELING DOWN, DEPRESSED OR HOPELESS: 0
SUM OF ALL RESPONSES TO PHQ QUESTIONS 1-9: 0
SUM OF ALL RESPONSES TO PHQ9 QUESTIONS 1 & 2: 0
1. LITTLE INTEREST OR PLEASURE IN DOING THINGS: 0
SUM OF ALL RESPONSES TO PHQ QUESTIONS 1-9: 0

## 2022-05-18 NOTE — PROGRESS NOTES
Dulce Shaffer, a male of 57 y.o. came to the office 5/18/2022. Patient Active Problem List   Diagnosis    PAC (premature atrial contraction)    Sarcoidosis    Syncope    HTN (hypertension)    Dual implantable cardioverter-defibrillator in situ    Hyperlipemia    Degenerative disc disease, cervical    Ureteral calculus, right    Gastroesophageal reflux disease with esophagitis    Type 2 diabetes mellitus without complication, without long-term current use of insulin (HCC)          Hypertension  This is a chronic problem. The current episode started more than 1 year ago. The problem is controlled. Pertinent negatives include no chest pain, headaches, palpitations, peripheral edema or shortness of breath. There are no compliance problems. Diabetes  He presents for his follow-up diabetic visit. He has type 2 diabetes mellitus. Pertinent negatives for hypoglycemia include no headaches. Associated symptoms include weakness. Pertinent negatives for diabetes include no chest pain, no foot paresthesias, no foot ulcerations, no polydipsia, no polyuria and no visual change. Symptoms are stable. Current diabetic treatment includes diet. His weight is stable. He is following a generally healthy diet. He participates in exercise intermittently. An ACE inhibitor/angiotensin II receptor blocker is being taken. Back Pain  This is a chronic problem. The pain is present in the lumbar spine. The quality of the pain is described as aching. The pain radiates to the left knee. Associated symptoms include tingling and weakness. Pertinent negatives include no chest pain, headaches or numbness. Treatments tried: PT, steroids. The treatment provided moderate relief. Allergies   Allergen Reactions    Alcohol Other (See Comments)     Rum causes stomach pain and cramping.           Current Outpatient Medications on File Prior to Visit   Medication Sig Dispense Refill    zinc gluconate 50 MG tablet Take 100 mg by mouth daily      NIACIN PO Take 2 capsules by mouth daily      aspirin 81 MG tablet Take 81 mg by mouth daily      TURMERIC PO Take by mouth nightly       ibuprofen (ADVIL;MOTRIN) 800 MG tablet Take 1 tablet by mouth every 8 hours as needed for Pain With food (Patient not taking: Reported on 5/18/2022) 30 tablet 1     No current facility-administered medications on file prior to visit. Review of Systems   Eyes: Negative for visual disturbance. Respiratory: Negative for shortness of breath. Cardiovascular: Negative for chest pain, palpitations and leg swelling. Gastrointestinal: Negative for constipation and diarrhea. Endocrine: Negative for polydipsia and polyuria. Musculoskeletal: Positive for back pain and gait problem. Neurological: Positive for tingling and weakness. Negative for numbness and headaches. other review of systems reviewed and are negative    OBJECTIVE:  /80   Pulse 67   Temp 97.2 °F (36.2 °C)   Wt 230 lb (104.3 kg)   SpO2 98%   BMI 33.00 kg/m²      Physical Exam  Vitals reviewed. Eyes:      General: No scleral icterus. Conjunctiva/sclera: Conjunctivae normal.   Neck:      Thyroid: No thyromegaly. Vascular: No carotid bruit. Cardiovascular:      Rate and Rhythm: Normal rate and regular rhythm. Heart sounds: No murmur heard. Pulmonary:      Effort: Pulmonary effort is normal.      Breath sounds: Normal breath sounds. No wheezing or rales. Abdominal:      General: Bowel sounds are normal.      Palpations: Abdomen is soft. There is no mass. Tenderness: There is no abdominal tenderness. There is no guarding or rebound. Musculoskeletal:         General: Normal range of motion. Cervical back: Neck supple. Lumbar back: No spasms or tenderness. Normal range of motion.       Comments: Negative standing flexion test  Negative straight leg raising seated  No paravertebral muscular hypertonicity  Muscular strength 5/5 in lower extremities  Neurological 2+/4 L4 & S1 reflexes   Lymphadenopathy:      Cervical: No cervical adenopathy. Skin:     General: Skin is warm and dry. Neurological:      Mental Status: He is alert and oriented to person, place, and time. Psychiatric:         Mood and Affect: Mood normal.         ASSESSMENT AND PLAN:    Demarco Obrien was seen today for hypertension and lower back pain. Diagnoses and all orders for this visit:    Type 2 diabetes mellitus without complication, without long-term current use of insulin ([facility] [address]  -     Lipid Panel; Future  -     Comprehensive Metabolic Panel; Future  -     Microalbumin / Creatinine Urine Ratio; Future  -     Hemoglobin A1C; Future    Essential hypertension  -     lisinopril (PRINIVIL;ZESTRIL) 20 MG tablet; Take 1 tablet by mouth daily    SONU (generalized anxiety disorder)    Gastroesophageal reflux disease with esophagitis  -     pantoprazole (PROTONIX) 20 MG tablet; Take 1 tablet by mouth daily    Sacroiliac joint dysfunction of left side  -     [facility] [place] [place]    - bp stable continue current med  - ok to stay off Wellbutrin  - low carb diet and exercise    Return in about 6 months (around 11/18/2022) for medicare pe.     Bryson Berger,

## 2022-05-25 DIAGNOSIS — E11.9 TYPE 2 DIABETES MELLITUS WITHOUT COMPLICATION, WITHOUT LONG-TERM CURRENT USE OF INSULIN (HCC): ICD-10-CM

## 2022-05-25 LAB
ALBUMIN SERPL-MCNC: 4.5 G/DL (ref 3.5–5.2)
ALP BLD-CCNC: 106 U/L (ref 40–129)
ALT SERPL-CCNC: 47 U/L (ref 0–40)
ANION GAP SERPL CALCULATED.3IONS-SCNC: 17 MMOL/L (ref 7–16)
AST SERPL-CCNC: 70 U/L (ref 0–39)
BILIRUB SERPL-MCNC: 0.8 MG/DL (ref 0–1.2)
BUN BLDV-MCNC: 13 MG/DL (ref 6–23)
CALCIUM SERPL-MCNC: 9.4 MG/DL (ref 8.6–10.2)
CHLORIDE BLD-SCNC: 101 MMOL/L (ref 98–107)
CHOLESTEROL, TOTAL: 215 MG/DL (ref 0–199)
CO2: 21 MMOL/L (ref 22–29)
CREAT SERPL-MCNC: 0.9 MG/DL (ref 0.7–1.2)
CREATININE URINE: 103 MG/DL (ref 40–278)
GFR AFRICAN AMERICAN: >60
GFR NON-AFRICAN AMERICAN: >60 ML/MIN/1.73
GLUCOSE BLD-MCNC: 142 MG/DL (ref 74–99)
HBA1C MFR BLD: 7.5 % (ref 4–5.6)
HDLC SERPL-MCNC: 40 MG/DL
LDL CHOLESTEROL CALCULATED: 127 MG/DL (ref 0–99)
MICROALBUMIN UR-MCNC: <12 MG/L
MICROALBUMIN/CREAT UR-RTO: ABNORMAL (ref 0–30)
POTASSIUM SERPL-SCNC: 4.7 MMOL/L (ref 3.5–5)
SODIUM BLD-SCNC: 139 MMOL/L (ref 132–146)
TOTAL PROTEIN: 6.8 G/DL (ref 6.4–8.3)
TRIGL SERPL-MCNC: 239 MG/DL (ref 0–149)
VLDLC SERPL CALC-MCNC: 48 MG/DL

## 2022-05-27 ENCOUNTER — TELEPHONE (OUTPATIENT)
Dept: PRIMARY CARE CLINIC | Age: 62
End: 2022-05-27

## 2022-05-27 NOTE — TELEPHONE ENCOUNTER
Patient discussed elevated cholesterol and hemoglobin A1c. He admits to not eating well over the past 6 months. At the present time he would like to follow a more strict low carb and cholesterol diet and follow-up in 3 months before going on any medication. Therefore we will see him in the office in 3 months for reevaluation of his type 2 diabetes and hyperlipidemia.

## 2022-06-23 ENCOUNTER — HOSPITAL ENCOUNTER (OUTPATIENT)
Dept: PHYSICAL THERAPY | Age: 62
Setting detail: THERAPIES SERIES
Discharge: HOME | End: 2022-06-23
Payer: MEDICARE

## 2022-06-23 PROCEDURE — 97161 PT EVAL LOW COMPLEX 20 MIN: CPT | Performed by: PHYSICAL THERAPIST

## 2022-06-23 ASSESSMENT — PAIN SCALES - GENERAL: PAINLEVEL_OUTOF10: 5

## 2022-06-23 ASSESSMENT — PAIN DESCRIPTION - PAIN TYPE: TYPE: CHRONIC PAIN

## 2022-06-23 ASSESSMENT — PAIN DESCRIPTION - ORIENTATION: ORIENTATION: LEFT

## 2022-06-23 ASSESSMENT — PAIN DESCRIPTION - DESCRIPTORS: DESCRIPTORS: TINGLING;NUMBNESS;ACHING

## 2022-06-23 ASSESSMENT — PAIN DESCRIPTION - LOCATION: LOCATION: BACK;LEG

## 2022-06-23 NOTE — PROGRESS NOTES
Physical Therapy    Physical Therapy: Initial Evaluation    Patient: Gibran Harper (51 y.o. male)   Examination Date:   Plan of Care Certification Period: 2022 to 22      :  1960 ;    Confirmed: Yes MRN: [id number]  CSN: [id number]   Insurance: Payor: Zheng Cole / Plan: [facility] SOLUTIONS / Product Type: *No Product type* /   Insurance ID: [id number] - (Medicare Managed) Secondary Insurance (if applicable): LEOPOLDO   Referring Physician: Shant Estrada*     PCP: Paty Strickland, DO Visits to Date/Visits Approved: 1 /      No Show/Cancelled Appts:   /       Medical Diagnosis: Sacrococcygeal disorders, not elsewhere classified [M53.3] M53.3 (ICD-10-CM) - Sacroiliac joint dysfunction of left side  Treatment Diagnosis:       PERTINENT MEDICAL HISTORY           Medical History: Chart Reviewed: Yes   Past Medical History:   Diagnosis Date    Cardiac defibrillator in place     [facility] [address]     Cecal polyp 2021    tubular adenoma    Diverticulitis     Encounter for screening colonoscopy     21    Gout     HTN (hypertension)     Neuroma of foot     [facility]monary [facility]oidosis [facility] [address]     Squamous cell skin cancer     joslyn    VT (ventricular tachycardia) ([facility] [address]      Surgical History:   Past Surgical History:   Procedure Laterality Date    BRONCHOSCOPY      CARDIAC DEFIBRILLATOR PLACEMENT  2008    CARDIAC DEFIBRILLATOR PLACEMENT  2013    D-ICD GENT CHANGE   ([facility])    CERVICAL DISC SURGERY      2017, C5--7 disc placement  Dr. Vania Mehta COLONOSCOPY N/A 2021    COLONOSCOPY WITH BIOPSY performed by Vani Patton MD at [address] [facility] LITHOTRIPSY Right 2019    CYSTOSCOPY RETROGRADE PYELOGRAM  URETEROSCOPY LASER LITHOTRIPSY RIGHT J-STENT performed by Nathalie Cardenas MD at [address] [facility]  2013    ICD genertor change    UPPER GASTROINTESTINAL ENDOSCOPY N/A 2021    EGD BIOPSY performed by Charles Julien MD at [facility] ENDOSCOPY       Medications:   Current Outpatient Medications:     lisinopril (PRINIVIL;ZESTRIL) 20 MG tablet, Take 1 tablet by mouth daily, Disp: 90 tablet, Rfl: 1    pantoprazole (PROTONIX) 20 MG tablet, Take 1 tablet by mouth daily, Disp: 90 tablet, Rfl: 1    zinc gluconate 50 MG tablet, Take 100 mg by mouth daily, Disp: , Rfl:     ibuprofen (ADVIL;MOTRIN) 800 MG tablet, Take 1 tablet by mouth every 8 hours as needed for Pain With food (Patient not taking: Reported on 5/18/2022), Disp: 30 tablet, Rfl: 1    NIACIN PO, Take 2 capsules by mouth daily, Disp: , Rfl:     aspirin 81 MG tablet, Take 81 mg by mouth daily, Disp: , Rfl:     TURMERIC PO, Take by mouth nightly , Disp: , Rfl:   Allergies: Alcohol      SUBJECTIVE EXAMINATION      ,           Subjective History:    Subjective: Patient presents to PT with c/o left sided low back pain. Pain present for the past few years. Admits to radicular symptoms across LLE. Feels like a bandage is wrapped across LLE. Patient has atttempted PT with minimal relief. Feels heavy lifting and bending tend to aggravate the back. Patient is not taking any medication for pain relief. He ambulates without AD. No buckling of knee. Admits to occasional weakness across LLE.   Additional Pertinent Hx (if applicable):     Previous treatments prior to current episode?: Outpatient PT      Learning/Language: Learning  Does the patient/guardian have any barriers to learning?: No barriers  What is the preferred language of the patient/guardian?: English     Pain Screening    Pain Screening  Patient Currently in Pain: Yes  Pain Assessment: 0-10  Pain Level: 5  Best Pain Level: 0  Worst Pain Level: 10  Pain Type: Chronic pain  Pain Location: Back,Leg  Pain Orientation: Left  Pain Descriptors: Tingling,Numbness,Aching  Aggravating factors: Reaching,Lifting,Carrying    Functional Status    Dominant Hand: : Right      OBJECTIVE EXAMINATION   Restrictions: Review of Systems:  Follows Commands: Within Functional Limits    Ambulation/Gait (if applicable):   Ambulation  Surface: level tile  Device: No Device  Assistance: Independent  Quality of Gait: stable heel-toe mechanics- slight fwd head/rounded shoulders    Balance Screen:   Balance  Posture: Fair (fwd head/rounded shoulders)  Sitting - Static: Good  Sitting - Dynamic: Good  Standing - Static: Good  Standing - Dynamic: Good    Left AROM  Right AROM       LLE- WFL     RLE- WFL        Left Strength  Right Strength         Strength LLE  L Hip Flexion: 4+/5  L Hip Extension: 4+/5  L Hip ABduction: 4+/5  L Hip ADduction: 4+/5  L Knee Flexion: 4+/5  L Knee Extension: 4+/5    Strength RLE  R Hip Flexion: 4+/5  R Hip Extension: 4+/5  R Hip ABduction: 4+/5  R Hip ADduction: 4+/5  R Knee Flexion: 4+/5  R Knee Extension: 4+/5       Lumbar Assessment     AROM Lumbar Spine   Lumbar spine general AROM: [facility]  Repeated movements completed: Yes  Standing flexion: Worse  Standing extension: Better  Prone extension: Better     Trunk Strength     Trunk Strength  Trunk Flexion: 4-/5  Trunk Extension: 4-/5          ASSESSMENT     Impression: Assessment: pt presents to PT with c/o back/LLE pain; limited strength noted across trunk with hindered posturing; repeated extension motions reduced low back symptoms however LLE symptoms remain    Body Structures, Functions, Activity Limitations Requiring Skilled Therapeutic Intervention: Decreased strength,Decreased posture,Increased pain    Statement of Medical Necessity: Physical Therapy is both indicated and medically necessary as outlined in the POC to increase the likelihood of meeting the functionally related goals stated below.      Patient's Activity Tolerance:        Patient's rehabilitation potential/prognosis is considered to be: Good    Factors which may impact rehabilitation potential include:          GOALS   Patient Goal(s):    Short Term Goals Completed by 2-3 weeks Goal Status   increase strength of trunk to grossly 4/5 improving upright posture to FAIR+     decrease pain to < 5/10 across back/LLE with activity                           Long Term Goals Completed by 4-6 weeks Goal Status   increase strength of trunk to grossly 4+/5 improving upright posture to GOOD-     decrease pain to < 3/10 across back/LLE with activity     pt demonstrates independence with HEP                      TREATMENT PLAN       Requires PT Follow-Up: Yes    Pt. actively involved in establishing Plan of Care and Goals: Yes  Patient/ Caregiver education and instruction:   pt educated on importance of erect posturing; instructed on use of lumbar roll for proper sitting posture           Treatment may include any combination of the following: Strengthening,ROM,Manual Therapy - Joint Manipulation,Manual Therapy - Soft Tissue Mobilization,Home exercise program,Safety education & training,Patient/Caregiver education & training     Frequency / Duration:  Patient to be seen 2 for 4-6 weeks      Eval Complexity:    Decision Making: Low Complexity    PT Treatment Completed:  N/A - Evaluation Only    Therapy Time  Individual Time In: 0805       Individual Time Out: 9442  Minutes: 45        Therapist Signature: Qamar Grant, PT    Date: 3/86/1126     I certify that the above Therapy Services are being furnished while the patient is under my care. I agree with the treatment plan and certify that this therapy is necessary. [de-identified] Signature:  ___________________________   Date:_______                                                                   Terrie Marino*        Physician Comments: _______________________________________________    Please sign and return to [facility] PHYSICAL THERAP[facility]. Please fax to the location listed below.  Maryann Condon for this referral!    [address] [facility] PHYSICAL THERAPY  [address] [facility] [address]  Dept: [phone]  Fax: [phone]       POC NOTE

## 2022-06-29 ENCOUNTER — HOSPITAL ENCOUNTER (OUTPATIENT)
Dept: PHYSICAL THERAPY | Age: 62
Setting detail: THERAPIES SERIES
Discharge: HOME | End: 2022-06-29
Payer: MEDICARE

## 2022-06-29 PROCEDURE — 97110 THERAPEUTIC EXERCISES: CPT | Performed by: PHYSICAL THERAPIST

## 2022-06-29 NOTE — PROGRESS NOTES
[facility]  Phone: [phone] Fax: [phone]       Physical Therapy Daily Treatment Note  Date:  2022    Patient Name:  Jose Alfredo Pepper    :  1960  MRN: [id number]    Patient: Jose Alfredo Pepper (20 y.o. male)   Examination Date:   Plan of Care Certification Period: 2022 to 22     :  1960 ;    Confirmed: Yes MRN: [id number]  CSN: [id number]   Insurance: Payor: Debra Proctor / Plan: [facility] SOLUTIONS / Product Type: *No Product type* /   Insurance ID: [id number] - (Medicare Managed) Secondary Insurance (if applicable): [facility]   Referring Physician: Ryan Carlos*     PCP: Raina Wang,  Visits to Date/Visits Approved: 2 /      No Show/Cancelled Appts:   /       Medical Diagnosis: Sacrococcygeal disorders, not elsewhere classified [M53.3] M53.3 (ICD-10-CM) - Sacroiliac joint dysfunction of left side  Treatment Diagnosis:         Time In:      0830  Time Out:     0850     Subjective:  Pt reports having severe back pain due to lifting heavy objects at home     Exercises:  Exercise/Equipment Resistance/Repetitions Other comments   Prone    x3mins     Prone on elbows   X3mins; x2mins    Prone press ups    2x5    Standing ext    2x10                                                                                                                  Other Therapeutic Activities:      Home Exercise Program:      Manual Treatments:      Modalities:  Unable due to defibrillator     Timed Code Treatment Minutes: Total Treatment Minutes:      Treatment/Activity Tolerance:  [x] Patient tolerated treatment well [] Patient limited by fatigue  [] Patient limited by pain  [] Patient limited by other medical complications  [] Other:  Performed extension based exercise to reduce symptoms. Symptoms subsided with prone exercise however did increase initially with standing.  Performed standing extensions following with significant reduction in symptoms. Gait improved with minimal antalgic mechanics following exercises.      Plan:   [x] Continue per plan of care [] Alter current plan (see comments)  [] Plan of care initiated [] Hold pending MD visit [] Discharge  Plan for Next Session:         Treatment Charges: Mins Units   Initial Evaluation     Re-Evaluation     Ther Exercise         TE 20 1   Manual Therapy     MT     Ther Activities        TA     Gait Training          GT     Neuro Re-education NR     Modalities     Non-Billable Service Time     Other     Total Time/Units 20 1     Electronically signed by:  Héctor Collins PT

## 2022-07-05 ENCOUNTER — HOSPITAL ENCOUNTER (OUTPATIENT)
Dept: PHYSICAL THERAPY | Age: 62
Setting detail: THERAPIES SERIES
Discharge: HOME | End: 2022-07-05
Payer: MEDICARE

## 2022-07-05 ENCOUNTER — TELEPHONE (OUTPATIENT)
Dept: PRIMARY CARE CLINIC | Age: 62
End: 2022-07-05

## 2022-07-05 PROCEDURE — 97110 THERAPEUTIC EXERCISES: CPT | Performed by: PHYSICAL THERAPIST

## 2022-07-05 NOTE — PROGRESS NOTES
[facility]  Phone: [phone] Fax: [phone]       Physical Therapy Daily Treatment Note  Date:  2022    Patient Name:  Deisy Harris    :  1960  MRN: [id number]    Patient: Deisy Harris (36 y.o. male)   Examination Date:   Plan of Care Certification Period: 2022 to 22     :  1960 ;    Confirmed: Yes MRN: [id number]  CSN: [id number]   Insurance: Payor: Radha Ruano / Plan: [facility] [employer] SOLUTIONS / Product Type: *No Product type* /   Insurance ID: [id number] - (Medicare Managed) Secondary Insurance (if applicable): [employer]   Referring Physician: Nahomi Irvin*     PCP: Elissa Verde DO Visits to Date/Visits Approved: 2 /      No Show/Cancelled Appts:   /       Medical Diagnosis: Sacrococcygeal disorders, not elsewhere classified [M53.3] M53.3 (ICD-10-CM) - Sacroiliac joint dysfunction of left side  Treatment Diagnosis:         Time In:      830  Time Out:     910     Subjective:  Pt reports being on feet this weekend for final showing of [place]. States limited his lifting     Exercises:  Exercise/Equipment Resistance/Repetitions Other comments   Prone    x3mins     Prone on elbows   X3mins    Prone press ups    3x5  1 set straight  2 sets off set    Standing ext    1x10  2x5 off set                                                                                                                   Other Therapeutic Activities:      Home Exercise Program:      Manual Treatments:      Modalities:  MH [id number]     Timed Code Treatment Minutes: Total Treatment Minutes:      Treatment/Activity Tolerance:  [x] Patient tolerated treatment well [] Patient limited by fatigue  [] Patient limited by pain  [] Patient limited by other medical complications  [] Other:  Performed extension based exercise to reduce symptoms. Patient would have little to no pain in prone position however symptoms would increase with transition to stand. Attempted standing extension however no relief. Placed MH on following in prone position which again reduced symptom. Gait remains guarded/antalgic.      Plan:   [x] Continue per plan of care [] Alter current plan (see comments)  [] Plan of care initiated [] Hold pending MD visit [] Discharge  Plan for Next Session:         Treatment Charges: Mins Units   Initial Evaluation     Re-Evaluation     Ther Exercise         TE 30 2   Manual Therapy     MT     Ther Activities        TA     Gait Training          GT     Neuro Re-education NR     Modalities 10 -   Non-Billable Service Time     Other     Total Time/Units 40 2     Electronically signed by:  Manuel Rojas PT

## 2022-07-05 NOTE — TELEPHONE ENCOUNTER
----- Message from [facility] sent at 7/5/2022 10:45 AM EDT -----  Subject: Message to Provider    QUESTIONS  Information for Provider? Pt is calling in stating that he is still having   back pain. He said he has recently seen his pcp for this and was wondering   if he could prescribe him a steroid pack because it has helped in the   past.   ---------------------------------------------------------------------------  --------------  Melissa De Los Santos [facility]  [phone]; OK to leave message on voicemail  ---------------------------------------------------------------------------  --------------  SCRIPT ANSWERS  Relationship to Patient?  Self

## 2022-07-11 ENCOUNTER — HOSPITAL ENCOUNTER (OUTPATIENT)
Dept: PHYSICAL THERAPY | Age: 62
Setting detail: THERAPIES SERIES
Discharge: HOME | End: 2022-07-11
Payer: MEDICARE

## 2022-07-11 PROCEDURE — 97110 THERAPEUTIC EXERCISES: CPT | Performed by: PHYSICAL THERAPIST

## 2022-07-11 NOTE — PROGRESS NOTES
[facility]  Phone: [phone] Fax: [phone]       Physical Therapy Daily Treatment Note  Date:  2022    Patient Name:  Sue West    :  1960  MRN: [id number]    Patient: Sue West (49 y.o. male)   Examination Date:   Plan of Care Certification Period: 2022 to 22     :  1960 ;    Confirmed: Yes MRN: [id number]  CSN: [id number]   Insurance: Payor: Yanick Tee / Plan: [facility] SOLUTIONS / Product Type: *No Product type* /   Insurance ID: [id number] - (Medicare Managed) Secondary Insurance (if applicable): [employer]   Referring Physician: Yaya Wall*     PCP: Veronica Lewis DO Visits to Date/Visits Approved: 4 /      No Show/Cancelled Appts:   /       Medical Diagnosis: Sacrococcygeal disorders, not elsewhere classified [M53.3] M53.3 (ICD-10-CM) - Sacroiliac joint dysfunction of left side  Treatment Diagnosis:         Time In:      830  Time Out:     905     Subjective:  Pt reports that he began prednisone. Reduction in back symptoms however cont to have symptoms across LLE. Admits to some knee weakness     Exercises:  Exercise/Equipment Resistance/Repetitions Other comments   Prone    x3mins     Prone on elbows   [id number]    Prone press ups    2x10     Standing ext    2x10    SLR    x10ea     sit to stand   x10                                                                                                    Other Therapeutic Activities:      Home Exercise Program:      Manual Treatments:      Modalities:      Timed Code Treatment Minutes: Total Treatment Minutes:      Treatment/Activity Tolerance:  [x] Patient tolerated treatment well [] Patient limited by fatigue  [] Patient limited by pain  [] Patient limited by other medical complications  [] Other:  Performed extension based exercise to reduce symptoms. Overall symptoms have been reduced.  Pt c/o L knee weakness therefore added SLR and sit to stand to promote LE strength. Also SLR and bridging to promote trunk stabilization. No significant pain following.      Plan:   [x] Continue per plan of care [] Alter current plan (see comments)  [] Plan of care initiated [] Hold pending MD visit [] Discharge  Plan for Next Session:         Treatment Charges: Mins Units   Initial Evaluation     Re-Evaluation     Ther Exercise         TE 30 2   Manual Therapy     MT     Ther Activities        TA     Gait Training          GT     Neuro Re-education NR     Modalities     Non-Billable Service Time 5    Other     Total Time/Units 30 2     Electronically signed by:  Carroll Myrick PT

## 2022-07-19 ENCOUNTER — HOSPITAL ENCOUNTER (OUTPATIENT)
Dept: PHYSICAL THERAPY | Age: 62
Setting detail: THERAPIES SERIES
Discharge: HOME | End: 2022-07-19
Payer: MEDICARE

## 2022-07-19 PROCEDURE — 97110 THERAPEUTIC EXERCISES: CPT | Performed by: PHYSICAL THERAPIST

## 2022-07-19 NOTE — PROGRESS NOTES
[facility]  Phone: [phone] Fax: [phone]       Physical Therapy Daily Treatment Note  Date:  2022    Patient Name:  Mary Jamison    :  1960  MRN: [id number]    Patient: Mary Jamison (13 y.o. male)   Examination Date:   Plan of Care Certification Period: 2022 to 22     :  1960 ;    Confirmed: Yes MRN: [id number]  CSN: [id number]   Insurance: Payor: Rowan Adame / Plan: [facility] SOLUTIONS / Product Type: *No Product type* /   Insurance ID: [id number] - (Medicare Managed) Secondary Insurance (if applicable): [employer]   Referring Physician: Colby Kasper*     PCP: Bob Cespedes DO Visits to Date/Visits Approved: 5 /      No Show/Cancelled Appts:   /       Medical Diagnosis: Sacrococcygeal disorders, not elsewhere classified [M53.3] M53.3 (ICD-10-CM) - Sacroiliac joint dysfunction of left side  Treatment Diagnosis:         Time In:      830  Time Out:     920     Subjective:  Pt reports wanting to see a physician about the pain that goes down his left leg. Patient reported that the pain in his back continues to get better everyday. He reported not doing HEP as often as he should. Patient reported symptoms being better after performing all exercises. Exercises:  Exercise/Equipment Resistance/Repetitions Other comments   Prone    x3mins     Prone on elbows   [id number]    Prone press ups    2x10     Standing ext    2x10    SLR    2x10ea    sit to stand   x10    Bridges x10                                                                                             Other Therapeutic Activities:      Home Exercise Program:      Manual Treatments:      Modalities:      Timed Code Treatment Minutes:       Total Treatment Minutes:      Treatment/Activity Tolerance:  [x] Patient tolerated treatment well [] Patient limited by fatigue  [] Patient limited by pain  [] Patient limited by other medical complications  [] Other:  Patient tolerated all exercises well today. Patient performed back extension exercises to reduce back symptoms. Added bridges to for trunk control. Patient was told to do HEP more often for back symptom reduction.     Plan:   [x] Continue per plan of care [] Alter current plan (see comments)  [] Plan of care initiated [] Hold pending MD visit [] Discharge  Plan for Next Session:         Treatment Charges: Mins Units   Initial Evaluation     Re-Evaluation     Ther Exercise         TE 40 2   Manual Therapy     MT     Ther Activities        TA     Gait Training          GT     Neuro Re-education NR     Modalities     Non-Billable Service Time 10    Other     Total Time/Units 50 2     Sully Spencer, SPT    Electronically signed by:  Karen Moreno, PT

## 2022-07-25 ENCOUNTER — TELEPHONE (OUTPATIENT)
Dept: PRIMARY CARE CLINIC | Age: 62
End: 2022-07-25

## 2022-07-25 ENCOUNTER — HOSPITAL ENCOUNTER (OUTPATIENT)
Dept: PHYSICAL THERAPY | Age: 62
Setting detail: THERAPIES SERIES
Discharge: HOME | End: 2022-07-25
Payer: MEDICARE

## 2022-07-25 DIAGNOSIS — M53.3 SACROILIAC JOINT DYSFUNCTION OF LEFT SIDE: Primary | ICD-10-CM

## 2022-07-25 PROCEDURE — 97110 THERAPEUTIC EXERCISES: CPT | Performed by: PHYSICAL THERAPIST

## 2022-07-25 NOTE — PROGRESS NOTES
[facility]  Phone: [phone] Fax: [phone]       Physical Therapy Daily Treatment Note  Date:  2022    Patient Name:  Geovani Matias    :  1960  MRN: [id number]    Patient: Geovani Matias (50 y.o. male)   Examination Date:   Plan of Care Certification Period: 2022 to 22     :  1960 ;    Confirmed: Yes MRN: [id number]  CSN: [id number]   Insurance: Payor: Bela Zavala / Plan: Karl[facility] Barnes / Product Type: *No Product type* /   Insurance ID: [id number] - (Medicare Managed) Secondary Insurance (if applicable): [employer]   Referring Physician: Diamond Braswell*     PCP: Haile De Luna DO Visits to Date/Visits Approved: 6/      No Show/Cancelled Appts:   /       Medical Diagnosis: Sacrococcygeal disorders, not elsewhere classified [M53.3] M53.3 (ICD-10-CM) - Sacroiliac joint dysfunction of left side  Treatment Diagnosis:         Time In:      845  Time Out:     920     Subjective:  Patient reported that symptoms have stayed the same since last session. He was in a car for 20 hour total within two days and reported symptoms were worse because of it. Exercises:  Exercise/Equipment Resistance/Repetitions Other comments   Prone    [id number]     Prone on elbows   [id number]    Prone press ups    3x10     Standing ext    2x10    SLR    3x10ea 2lbs       Bridges 3x10                                                                                             Other Therapeutic Activities:      Home Exercise Program:      Manual Treatments:      Modalities:      Timed Code Treatment Minutes: Total Treatment Minutes:      Treatment/Activity Tolerance:  [x] Patient tolerated treatment well [] Patient limited by fatigue  [] Patient limited by pain  [] Patient limited by other medical complications  [] Other:  Patient was able to complete all exercises without cueing. 2 lb ankle weights were added to SLR and pt was able to complete 3 sets. Pt was told HEP should be completed a few times a day for symptom reduction.       Plan:   [x] Continue per plan of care [] Alter current plan (see comments)  [] Plan of care initiated [] Hold pending MD visit [] Discharge  Plan for Next Session:         Treatment Charges: Mins Units   Initial Evaluation     Re-Evaluation     Ther Exercise         TE 30 2   Manual Therapy     MT     Ther Activities        TA     Gait Training          GT     Neuro Re-education NR     Modalities     Non-Billable Service Time 5    Other     Total Time/Units 35 2     Sully Spencer, SPT    Electronically signed by:  Jeannie Major, PT

## 2022-07-25 NOTE — TELEPHONE ENCOUNTER
Patient would like referral back to his orthopedic spine surgeon due to chronic low back pain. We will set him up with Dr. Alma Odom who he has seen before.

## 2022-07-25 NOTE — TELEPHONE ENCOUNTER
----- Message from Marquis Buck sent at 7/25/2022  8:08 AM EDT -----  Subject: Referral Request    Reason for referral request? Pt wants a referral for a surgeon, that has   already operated on him, he does not know if he needs a new referral, does   not have phone number, said provider is at [facility] dr. Kiran Dean,   lower back,   Provider patient wants to be referred to(if known):     Provider Phone Number(if known):[phone]    Additional Information for Provider?   ---------------------------------------------------------------------------  --------------  [address] In[address]Patient    [phone]; OK to leave message on voicemail  ---------------------------------------------------------------------------  --------------

## 2022-08-08 ENCOUNTER — APPOINTMENT (OUTPATIENT)
Dept: PHYSICAL THERAPY | Age: 62
End: 2022-08-08
Payer: MEDICARE

## 2022-08-15 ENCOUNTER — HOSPITAL ENCOUNTER (OUTPATIENT)
Dept: PHYSICAL THERAPY | Age: 62
Setting detail: THERAPIES SERIES
Discharge: HOME | End: 2022-08-15
Payer: MEDICARE

## 2022-08-15 PROCEDURE — 97110 THERAPEUTIC EXERCISES: CPT | Performed by: PHYSICAL THERAPIST

## 2022-08-15 NOTE — PROGRESS NOTES
[facility]  Phone: [phone] Fax: [phone]       Physical Therapy Daily Treatment Note  Date:  8/15/2022    Patient Name:  Gladys Adair    :  1960  MRN: [id number]    Patient: Gladys Adair (86 y.o. male)   Examination Date:   Plan of Care Certification Period: 2022 to 22     :  1960 ;    Confirmed: Yes MRN: [id number]  CSN: [id number]   Insurance: Payor: [facility] / Plan: [place] [employer] / Product Type: *No Product type* /   Insurance ID: [id number] - (Medicare Managed) Secondary Insurance (if applicable): LEOPOLDO   Referring Physician: Polly Neville*     PCP: Cheryl Avila DO Visits to Date/Visits Approved: 6/      No Show/Cancelled Appts:   /       Medical Diagnosis: Sacrococcygeal disorders, not elsewhere classified [M53.3] M53.3 (ICD-10-CM) - Sacroiliac joint dysfunction of left side  Treatment Diagnosis:         Time In:      830  Time Out:     915     Subjective:  Patient reported symptoms staying the same last session. He went on vacation and states symptoms did not improve in that time. He reported he is seeing the  in a few days. Exercises:  Exercise/Equipment Resistance/Repetitions Other comments   Prone    [id number]     Prone on elbows   [id number]    Prone press ups    2x10     Standing ext    2x10    SLR    2x10ea        Bridges 2x10                                                                                             Other Therapeutic Activities:      Home Exercise Program:      Manual Treatments:      Modalities:      Timed Code Treatment Minutes: Total Treatment Minutes:      Treatment/Activity Tolerance:  [x] Patient tolerated treatment well [] Patient limited by fatigue  [] Patient limited by pain  [] Patient limited by other medical complications  [] Other:  Patient was able to complete all exercises with some symptom reproduction.  He experienced symptoms in the first few reps of prone press up but it went away towards the end of each set.     Plan:   [x] Continue per plan of care [] Alter current plan (see comments)  [] Plan of care initiated [] Hold pending MD visit [] Discharge  Plan for Next Session:         Treatment Charges: Mins Units   Initial Evaluation     Re-Evaluation     Ther Exercise         TE 40 2   Manual Therapy     MT     Ther Activities        TA     Gait Training          GT     Neuro Re-education NR     Modalities     Non-Billable Service Time 5    Other     Total Time/Units 45 2     Sully Spencer, SPT    Electronically signed by:  Kya Durand PT

## 2022-08-16 ENCOUNTER — HOSPITAL ENCOUNTER (OUTPATIENT)
Dept: PHYSICAL THERAPY | Age: 62
Setting detail: THERAPIES SERIES
End: 2022-08-16
Payer: MEDICARE

## 2022-08-22 ENCOUNTER — HOSPITAL ENCOUNTER (OUTPATIENT)
Dept: PHYSICAL THERAPY | Age: 62
Setting detail: THERAPIES SERIES
Discharge: HOME | End: 2022-08-22
Payer: MEDICARE

## 2022-08-22 PROCEDURE — 97110 THERAPEUTIC EXERCISES: CPT | Performed by: PHYSICAL THERAPIST

## 2022-08-22 NOTE — PROGRESS NOTES
[facility]  Phone: [phone] Fax: [phone]       Physical Therapy Daily Treatment Note  Date:  2022    Patient Name:  Deisy Harris    :  1960  MRN: [id number]    Patient: Deisy Harris (89 y.o. male)   Examination Date:   Plan of Care Certification Period: 2022 to 22     :  1960 ;    Confirmed: Yes MRN: [id number]  CSN: [id number]   Insurance: Payor: Radha Ruano / Plan: [facility] [employer]UTIONS / Product Type: *No Product type* /   Insurance ID: [id number] - (Medicare Managed) Secondary Insurance (if applicable): LEOPOLDO   Referring Physician: Nahomi Irvin*     PCP: Elissa Verde DO Visits to Date/Visits Approved: 7/      No Show/Cancelled Appts:   /       Medical Diagnosis: Sacrococcygeal disorders, not elsewhere classified [M53.3] M53.3 (ICD-10-CM) - Sacroiliac joint dysfunction of left side  Treatment Diagnosis:         Time In:      840  Time Out:     15     Subjective:  Patient reports waking up stiff this morning. States that he is going to have surgery. Waiting to hear from NS. Exercises:  Exercise/Equipment Resistance/Repetitions Other comments   Prone    [id number]     Prone on elbows   [id number]    Prone press ups    2x10     Standing ext    2x10    SLR    2x10ea        Bridges 2x10                                                                                             Other Therapeutic Activities:      Home Exercise Program:      Manual Treatments:      Modalities:      Timed Code Treatment Minutes: Total Treatment Minutes:      Treatment/Activity Tolerance:  [x] Patient tolerated treatment well [] Patient limited by fatigue  [] Patient limited by pain  [] Patient limited by other medical complications  [] Other:  Patient was able to complete all exercises with reduction in back pain/tightness. Overall movements remain slightly slow/guarded.  Gait remains stable without AD    Plan:   [x] Continue per plan of care [] Alter current plan (see comments)  [] Plan of care initiated [] Hold pending MD visit [] Discharge  Plan for Next Session:         Treatment Charges: Mins Units   Initial Evaluation     Re-Evaluation     Ther Exercise         TE 30 2   Manual Therapy     MT     Ther Activities        TA     Gait Training          GT     Neuro Re-education NR     Modalities     Non-Billable Service Time 5    Other     Total Time/Units 35 2         Electronically signed by:  Marcy Painting PT

## 2022-09-16 ENCOUNTER — HOSPITAL ENCOUNTER (OUTPATIENT)
Dept: CT IMAGING | Age: 62
Discharge: HOME | End: 2022-09-18
Payer: MEDICARE

## 2022-09-16 ENCOUNTER — HOSPITAL ENCOUNTER (OUTPATIENT)
Age: 62
Discharge: HOME | End: 2022-09-16
Payer: MEDICARE

## 2022-09-16 ENCOUNTER — HOSPITAL ENCOUNTER (OUTPATIENT)
Dept: GENERAL RADIOLOGY | Age: 62
Discharge: HOME | End: 2022-09-18
Payer: MEDICARE

## 2022-09-16 VITALS
HEART RATE: 68 BPM | SYSTOLIC BLOOD PRESSURE: 150 MMHG | DIASTOLIC BLOOD PRESSURE: 90 MMHG | RESPIRATION RATE: 19 BRPM | OXYGEN SATURATION: 98 %

## 2022-09-16 DIAGNOSIS — M43.16 SPONDYLOLISTHESIS OF LUMBAR REGION: ICD-10-CM

## 2022-09-16 DIAGNOSIS — M51.37 OTHER INTERVERTEBRAL DISC DEGENERATION, LUMBOSACRAL REGION: ICD-10-CM

## 2022-09-16 DIAGNOSIS — M54.16 LUMBAR RADICULOPATHY: ICD-10-CM

## 2022-09-16 DIAGNOSIS — M54.50 LOW BACK PAIN, UNSPECIFIED BACK PAIN LATERALITY, UNSPECIFIED CHRONICITY, UNSPECIFIED WHETHER SCIATICA PRESENT: ICD-10-CM

## 2022-09-16 LAB
INR BLD: 1
PLATELET # BLD: 222 E9/L (ref 130–450)
PROTHROMBIN TIME: 11.2 SEC (ref 9.3–12.4)

## 2022-09-16 PROCEDURE — 7100000010 HC PHASE II RECOVERY - FIRST 15 MIN

## 2022-09-16 PROCEDURE — 6370000000 HC RX 637 (ALT 250 FOR IP): Performed by: RADIOLOGY

## 2022-09-16 PROCEDURE — 72132 CT LUMBAR SPINE W/DYE: CPT

## 2022-09-16 PROCEDURE — 7100000011 HC PHASE II RECOVERY - ADDTL 15 MIN

## 2022-09-16 PROCEDURE — 85610 PROTHROMBIN TIME: CPT

## 2022-09-16 PROCEDURE — 36415 COLL VENOUS BLD VENIPUNCTURE: CPT

## 2022-09-16 PROCEDURE — 6360000004 HC RX CONTRAST MEDICATION: Performed by: RADIOLOGY

## 2022-09-16 PROCEDURE — 85049 AUTOMATED PLATELET COUNT: CPT

## 2022-09-16 PROCEDURE — 62304 MYELOGRAPHY LUMBAR INJECTION: CPT

## 2022-09-16 PROCEDURE — 2500000003 HC RX 250 WO HCPCS: Performed by: RADIOLOGY

## 2022-09-16 RX ORDER — LIDOCAINE HYDROCHLORIDE 10 MG/ML
INJECTION, SOLUTION INFILTRATION; PERINEURAL
Status: COMPLETED | OUTPATIENT
Start: 2022-09-16 | End: 2022-09-16

## 2022-09-16 RX ORDER — ACETAMINOPHEN 325 MG/1
650 TABLET ORAL EVERY 4 HOURS PRN
Status: DISCONTINUED | OUTPATIENT
Start: 2022-09-16 | End: 2022-09-19 | Stop reason: HOSPADM

## 2022-09-16 RX ADMIN — IOPAMIDOL 14 ML: 408 INJECTION, SOLUTION INTRATHECAL at 11:53

## 2022-09-16 RX ADMIN — LIDOCAINE HYDROCHLORIDE 10 ML: 10 INJECTION, SOLUTION INFILTRATION; PERINEURAL at 11:33

## 2022-09-16 NOTE — DISCHARGE INSTRUCTIONS
understand exactly what your doctor wants you to do. Be sure you have someone to take you home. Anesthesia and pain medicine will make it unsafe for you to drive or get home on your own. How is the test done? The dye is put into your spinal canal with a thin needle. This is called a lumbar puncture. The dye moves through the space so the nerve roots and spinal cord can be seen more clearly. After the dye is put in, you will lie still while the X-ray pictures are taken. How does it feel? You will feel a quick sting from a small needle that has medicine to numb the skin on your back. You will also feel some pressure as the long, thin spinal needle is put into your spinal canal. You may feel a quick, sharp pain down your buttock or leg when the needle is moved in your spine. You may find it hard to lie on your stomach or side during this test.  The dye may make you feel warm and flushed and have a metallic taste in your mouth. Some people feel sick to their stomach or have a headache. Tell your doctor how you are feeling. How long does the test take? A myelogram usually takes 30 minutes to 1 hour. What happens after the test?  You will probably be able to go home 30 minutes to 2 hours after the test.  You may need to lie in bed with your head raised for 4 to 24 hours after the test. To prevent seizures, which are a rare side effect, do not bend over or lie down with your head lower than your body. Keeping your head higher than your body after a myelogram also may help prevent or reduce other side effects, such as headache, nausea, or vomiting. Avoid strenuous activity, such as running or heavy lifting, for at least 1 day after your myelogram.  Drink plenty of water after the myelogram. Your doctor will give you instructions on taking your regular medicines. When should you call for help? Call 911 anytime you think you may need emergency care. For example, call if:  You have a seizure.   Call your doctor now or seek immediate medical care if:  You have any increase in pain, weakness, or numbness in your legs. You have a severe headache or stiff neck, or your eyes become very sensitive to light. You have a headache that lasts longer than 24 hours. You have problems urinating or having a bowel movement. You have a fever. Follow-up care is a key part of your treatment and safety. Be sure to make and go to all appointments, and call your doctor if you are having problems. It's also a good idea to keep a list of the medicines you take. Ask your doctor when you can expect to have your test results. Where can you learn more? Go to https://OffScalepeFPSI.[employer]. org and sign in to your [employer] account. Enter A462 in the Exie box to learn more about \"Myelogram: About This Test.\"     If you do not have an account, please click on the \"Sign Up Now\" link. Current as of: April 5, 2022               Content Version: 13.4  © 2006-2022 Healthwise, Incorporated. Care instructions adapted under license by [facility] ([facility]). If you have questions about a medical condition or this instruction, always ask your healthcare professional. [place] [address] any warranty or liability for your use of this information.

## 2022-09-16 NOTE — OR NURSING
Patient arrival from home to radiology for lumbar myelogram. Vitals taken, consent signed, and Dr. Edel Fernandez in to speak with the patient about the procedure. Patient placed prone on Fluoroscopy table, patient prepped and draped. Using fluoroscopy imaging, needle placed to lower back by Dr. Edel Fernandez @ [address]. Contrast injected. Needle removed, site cleansed, and band aid applied to site. Patient placed on cart and taken to CT. Report called to stage II recovery.

## 2022-09-21 NOTE — PROGRESS NOTES
[address]  [place] [address] [facility] Electrophysiology  Outpatient Progress Note  Kodi Lozano  1960  Date of Service: 9/22/2022  PCP: Cam Dong DO  Electrophysiologist: Dr Victoria Haile         Subjective: Kodi Lozano is seen for follow-up and management of: ICD    Last seen in the office with Dr Victoria Haile on 4/5/2021    PMH as noted below significant for cardiac/pulmonary sarcoidosis, VT status post dual-chamber ICD, syncope, hypertension, diabetes, obesity, gout, cervical spine surgery and GERD. He was previously managed in [place] and had his ICD implant done in [place] in August 2008. He has been doing overall well. Denies any near-syncope or syncope. He did have some episodes of lightheadedness when he gets up too fast or bends over and stands up but has not had this feeling on a regular basis. Does have palpitations at times that do not last longer than a few seconds. Denies any shortness of breath or swelling. Prior cardiac testing:  TTE (5/21/20): LVEF = 65-70%, mild concentric LVH. TTE (12/15/10): LVEF = 65%, normal.  TTE (11/20/09): LVEF = 65%, normal.  EP Study (8/7/08): polymorphic VT at S1-S2 of 400 msec and 230 msec. TTE (7/24/08): LVEF = 65%, borderline LAE. Event monitor (7/22/08): sinus rhythm with HR = 70-80 bpm, single isolated PAC, and no ventricular dysrhythmias. Cardiac MRI (7/7/08): abnormal LGE of entire endocardial and myocardial RV, abnormal LGE of septum and apex of LV predominately endocardial; prominence of left hilum which may represent adenopathy; suggestive of cardiac sarcoidosis. Exercise Nuclear Stress Test (7/7/08): LVEF = 58% with normal perfusion imaging at 95% APMHR, 14.4 METS. Event monitor (3/2008):  Normal       Patient Active Problem List   Diagnosis    PAC (premature atrial contraction)    Sarcoidosis    Syncope    HTN (hypertension)    Dual implantable cardioverter-defibrillator in situ    Hyperlipemia    Degenerative disc disease, cervical    Ureteral calculus, right    Gastroesophageal reflux disease with esophagitis    Type 2 diabetes mellitus without complication, without long-term current use of insulin (HCC)       Current Outpatient Medications   Medication Sig Dispense Refill    lisinopril (PRINIVIL;ZESTRIL) 20 MG tablet Take 1 tablet by mouth daily 90 tablet 1    pantoprazole (PROTONIX) 20 MG tablet Take 1 tablet by mouth daily 90 tablet 1    zinc gluconate 50 MG tablet Take 100 mg by mouth daily      ibuprofen (ADVIL;MOTRIN) 800 MG tablet Take 1 tablet by mouth every 8 hours as needed for Pain With food 30 tablet 1    NIACIN PO Take 2 capsules by mouth daily      aspirin 81 MG tablet Take 81 mg by mouth daily      TURMERIC PO Take by mouth nightly       dexamethasone (DECADRON) 0.75 MG tablet One pill 3 times a day for 3 days then one twice a day for 2 days then once a day for 1 day (Patient not taking: No sig reported) 14 tablet 0     No current facility-administered medications for this visit. Allergies   Allergen Reactions    Alcohol Other (See Comments)     Rum causes stomach pain and cramping. ROS:   Constitutional: Negative for fever, activity change and appetite change. HENT: Negative for epistaxis. Eyes: Negative for diploplia, blurred vision. Respiratory: Negative for cough, chest tightness, shortness of breath and wheezing. Cardiovascular: pertinent positives in HPI  Gastrointestinal: Negative for abdominal pain and blood in stool. All other review of systems are negative     PHYSICAL EXAM:      Vitals:    09/22/22 0803   BP: (!) 140/80   Pulse: 52   Weight: 225 lb (102.1 kg)   Height: 5' 10\" (1.778 m)     Constitutional: well-developed, no acute distress  Eyes: conjunctivae normal, no xanthelasma   Ears, Nose, Throat: oral mucosa moist, no cyanosis   CV: no JVD. Regular rate and rhythm. Normal S1S2 and no S3. No murmurs, rubs, or gallops.  PMI is nondisplaced  Lungs: clear to history of pulmonary sarcoidosis (diagnosed on endobronchial biopsy-per OSH note; but test result not available). -Patient is not interested in seeing Cardiac Sarcoid specialist at the [facility] clinic at this time, would consider it if any issues arise or further treatment needed    3. Hypertension    Recommendations:    1. Remote monitoring every 91 days  2. Office visit in 1 year or sooner with questions, issues    Re-education on importance of well controlled HTN (goal BP < 130/80), adequate weight control (goal BMI of < 27), physical activity consisting of moderate cardiopulmonary exercise up to a goal of 250 min/wk, smoking/tobacco abstinence and limited ETOH intake. I have spent a total of 30 minutes with the patient and the family reviewing the above stated recommendations. And a total of >50% of that time involved face-to-face time providing counseling and or coordination of care with the other providers. Thank you for allowing me to participate in your patient's care. Please call me if there are any questions or concerns.       Ander Lino, APRN - CNP  Cardiac Electrophysiology  [facility]) Physicians  [facility]  8:21 AM  9/22/2022

## 2022-09-22 ENCOUNTER — OFFICE VISIT (OUTPATIENT)
Dept: NON INVASIVE DIAGNOSTICS | Age: 62
End: 2022-09-22
Payer: MEDICARE

## 2022-09-22 VITALS
BODY MASS INDEX: 32.21 KG/M2 | SYSTOLIC BLOOD PRESSURE: 140 MMHG | DIASTOLIC BLOOD PRESSURE: 80 MMHG | WEIGHT: 225 LBS | HEIGHT: 70 IN | HEART RATE: 52 BPM

## 2022-09-22 DIAGNOSIS — I10 PRIMARY HYPERTENSION: ICD-10-CM

## 2022-09-22 DIAGNOSIS — Z95.810 DUAL IMPLANTABLE CARDIOVERTER-DEFIBRILLATOR IN SITU: Primary | ICD-10-CM

## 2022-09-22 DIAGNOSIS — I47.20 VT (VENTRICULAR TACHYCARDIA): ICD-10-CM

## 2022-09-22 DIAGNOSIS — D86.85 CARDIAC SARCOIDOSIS (HCC): ICD-10-CM

## 2022-09-22 PROCEDURE — 99214 OFFICE O/P EST MOD 30 MIN: CPT | Performed by: NURSE PRACTITIONER

## 2022-09-22 PROCEDURE — 93000 ELECTROCARDIOGRAM COMPLETE: CPT | Performed by: STUDENT IN AN ORGANIZED HEALTH CARE EDUCATION/TRAINING PROGRAM

## 2022-09-28 ENCOUNTER — HOSPITAL ENCOUNTER (OUTPATIENT)
Dept: PHYSICAL THERAPY | Age: 62
Setting detail: THERAPIES SERIES
Discharge: HOME | End: 2022-09-28
Payer: MEDICARE

## 2022-09-28 PROCEDURE — 97110 THERAPEUTIC EXERCISES: CPT | Performed by: PHYSICAL THERAPIST

## 2022-09-28 NOTE — PROGRESS NOTES
[facility]    Phone: [phone] Fax: [phone]     To: Dr. Tran Wakulla        From: Vianney Edwards, PT, DPT     Patient: Corina Kirkpatrick          : 1960  Diagnosis:  Sacrococcygeal disorders, not elsewhere classified [M53.3] M53.3 (ICD-10-CM) - Sacroiliac joint dysfunction of left side     Date: 2022      Physical Therapy Progress Note    Total Visits to date:   9 Cancels/No-shows to date:      Plan of Care/Treatment to date:  [x] Therapeutic Exercise    [] Modalities:  [] Therapeutic Activity     [] Ultrasound  [] Electrical Stimulation  [] Gait Training      [] Cervical Traction   [] Lumbar Traction  [] Neuromuscular Re-education  [] Cold/hotpack [] Iontophoresis  [] Instruction in HEP      Other:  [x] Manual Therapy       []    [] Aquatic Therapy       []                          Significant Findings At Last Visit/Comments:    Patient had held from therapy for approx 1 month. Was hoping to see surgeon (Dr. Veena Nash)  to determine next step for reducing symptoms. Unfortunately, the surgeon is moving offices and will be taking some time off. Pt returned to PT today. Cont to have soreness across back region. Posture remains slightly hindered with rounded shoulders noted (FAIR+). ROM of trunk has improved with extension of spine WFL. Trunk strength remains reduced at 4/5. PT recommends cont with PT 1x/week x 6 weeks to improve trunk strength allowing for improved overall posture and reduction in pain symptoms.            Frequency/Duration:  # Days per week: [x] 1 day # Weeks: [] 1 week [] 4 weeks      [] 2 days   [] 2 weeks [] 5 weeks      [] 3 days   [] 3 weeks [x] 6 weeks     Rehab Potential: [] Excellent [] Good [] Fair  [] Poor     Goal Status:  [] Achieved [x] Partially Achieved  [] Not Achieved     Patient Status: [] Continue per initial plan of Care     [] Patient now discharged     [x] Additional visits requested, Please re-certify for additional visits:      Requested frequency/duration:  1X/week for 6 weeks      Medicare Recert 5/85/95 to 07/26/57    Electronically signed by:  Mary Castillo, PT ,DPT    If you have any questions or concerns, please don't hesitate to call.   Thank you for your referral.    Physician Signature:________________________________Date:__________________  By signing above, therapists plan is approved by physician

## 2022-09-28 NOTE — PROGRESS NOTES
[facility]  Phone: [phone] Fax: [phone]       Physical Therapy Daily Treatment Note  Date:  2022    Patient Name:  Marissa Lau    :  1960  MRN: [id number]    Patient: Marissa Lau (77 y.o. male)   Examination Date:   Plan of Care Certification Period: 2022 to 22     :  1960 ;    Confirmed: Yes MRN: [id number]  CSN: [id number]   Insurance: Payor: Beau Rasher / Plan: [facility] [employer] SOLUTIONS / Product Type: *No Product type* /   Insurance ID: [id number] - (Medicare Managed) Secondary Insurance (if applicable): [employer]DO   Referring Physician: Janine Arauz     PCP: Rio Herbert, DO Visits to Date/Visits Approved:  Show/Cancelled Appts:   /       Medical Diagnosis: Sacrococcygeal disorders, not elsewhere classified [M53.3] M53.3 (ICD-10-CM) - Sacroiliac joint dysfunction of left side  Treatment Diagnosis:         Time In:      840  Time Out:     910     Subjective:  Patient reports having some generalized back pain    Exercises:  Exercise/Equipment Resistance/Repetitions Other comments   Prone    [id number]     Prone on elbows   [id number]    Prone press ups    2x10     Standing ext    2x10    SLR    2x10ea        Bridges 2x10                                                                                             Other Therapeutic Activities:      Home Exercise Program:      Manual Treatments:      Modalities:      Timed Code Treatment Minutes: Total Treatment Minutes:      Treatment/Activity Tolerance:  [x] Patient tolerated treatment well [] Patient limited by fatigue  [] Patient limited by pain  [] Patient limited by other medical complications  [] Other:  Patient had held from therapy for approx 1 month. Was hoping to see surgeon (Dr. Viki Ospina)  to determine next step for reducing symptoms. Unfortunately, the surgeon is moving offices and will be taking some time off. Pt returned to PT today.  Cont to have soreness across back region. Posture remains slightly hindered with rounded shoulders noted (FAIR+). ROM of trunk has improved with extension of spine WFL. Trunk strength remains reduced at 4/5. PT recommends cont with PT 1x/week x 6 weeks to improve trunk strength allowing for improved overall posture and reduction in pain symptoms.        Plan:   [x] Continue per plan of care [] Alter current plan (see comments)  [] Plan of care initiated [] Hold pending MD visit [] Discharge  Plan for Next Session:         Treatment Charges: Mins Units   Initial Evaluation     Re-Evaluation     Ther Exercise         TE 30 2   Manual Therapy     MT     Ther Activities        TA     Gait Training          GT     Neuro Re-education NR     Modalities     Non-Billable Service Time     Other     Total Time/Units 30 2         Electronically signed by:  Nita Buckley PT

## 2022-10-03 ENCOUNTER — HOSPITAL ENCOUNTER (OUTPATIENT)
Dept: PHYSICAL THERAPY | Age: 62
Setting detail: THERAPIES SERIES
Discharge: HOME | End: 2022-10-03
Payer: MEDICARE

## 2022-10-03 PROCEDURE — 97110 THERAPEUTIC EXERCISES: CPT | Performed by: PHYSICAL THERAPIST

## 2022-10-03 NOTE — PROGRESS NOTES
[facility]  Phone: [phone] Fax: [phone]       Physical Therapy Daily Treatment Note  Date:  10/3/2022    Patient Name:  Dylan Lugo    :  1960  MRN: [id number]    Patient: Dylan Lugo (84 y.o. male)   Examination Date:   Plan of Care Certification Period: 2022 to 22     :  1960 ;    Confirmed: Yes MRN: [id number]  CSN: [id number]   Insurance: Payor: [employer] / Plan: [facility] SOLUTIONS / Product Type: *No Product type* /   Insurance ID: [id number] - (Medicare Managed) Secondary Insurance (if applicable):    Referring Physician: Joseluis Puente*     PCP: Sarwat Werner DO Visits to Date/Visits Approved: 10      No Show/Cancelled Appts:   /       Medical Diagnosis: Sacrococcygeal disorders, not elsewhere classified [M53.3] M53.3 (ICD-10-CM) - Sacroiliac joint dysfunction of left side  Treatment Diagnosis:         Time In:      830  Time Out:     910     Subjective:  Patient reports having some generalized back pain    Exercises:  Exercise/Equipment Resistance/Repetitions Other comments   Prone    [id number]     Prone on elbows   [id number]    Prone press ups    2x10     Standing ext    2x10    SLR    2x10ea        Bridges 2x10           Seated on ball tband rows                                  Pulls                               rotations   1x15; 2x15 single leg  1x15; 2x15 single leg  2x15 ea  BTB                                                                               Other Therapeutic Activities:      Home Exercise Program:      Manual Treatments:      Modalities:      Timed Code Treatment Minutes: Total Treatment Minutes:      Treatment/Activity Tolerance:  [x] Patient tolerated treatment well [] Patient limited by fatigue  [] Patient limited by pain  [] Patient limited by other medical complications  [] Other:  Cont there ex to increase AROM/strength across trunk region.  Added core stabilization exercises. Good technique/form throughout. Patient has tband at home. - instructed to begin exercises at home daily per pt tolerance       Plan:   [x] Continue per plan of care [] Alter current plan (see comments)  [] Plan of care initiated [] Hold pending MD visit [] Discharge  Plan for Next Session:         Treatment Charges: Mins Units   Initial Evaluation     Re-Evaluation     Ther Exercise         TE 40 2   Manual Therapy     MT     Ther Activities        TA     Gait Training          GT     Neuro Re-education NR     Modalities     Non-Billable Service Time     Other     Total Time/Units 40 2         Electronically signed by:  Jovani Foster PT

## 2022-10-10 ENCOUNTER — HOSPITAL ENCOUNTER (OUTPATIENT)
Dept: PHYSICAL THERAPY | Age: 62
Setting detail: THERAPIES SERIES
Discharge: HOME | End: 2022-10-10
Payer: MEDICARE

## 2022-10-10 PROCEDURE — 97110 THERAPEUTIC EXERCISES: CPT | Performed by: PHYSICAL THERAPIST

## 2022-10-10 NOTE — PROGRESS NOTES
[facility]  Phone: [phone] Fax: [phone]       Physical Therapy Daily Treatment Note  Date:  10/10/2022    Patient Name:  Yobany Carrillo    :  1960  MRN: [id number]    Patient: Yobany Carrillo (52 y.o. male)   Examination Date:   Plan of Care Certification Period: 2022 to 22     :  1960 ;    Confirmed: Yes MRN: [id number]  CSN: [id number]   Insurance: Payor: Mora Guerragm / Plan: [facility] [employer]UT[employer] / Product Type: *No Product type* /   Insurance ID: [id number] - (Medicare Managed) Secondary Insurance (if applicable): LEOPOLDO   Referring Physician: Dee Arreola*     PCP: Iam Robledo DO Visits to Date/Visits Approved: 11/      No Show/Cancelled Appts:   /       Medical Diagnosis: Sacrococcygeal disorders, not elsewhere classified [M53.3] M53.3 (ICD-10-CM) - Sacroiliac joint dysfunction of left side  Treatment Diagnosis:         Time In:      830  Time Out:     910     Subjective:  Patient reports that he cont to have back pain. States pain has not limited his function as it did in early summer     Exercises:  Exercise/Equipment Resistance/Repetitions Other comments   Prone    [id number]     Prone on elbows   [id number]    Prone press ups    2x10     Standing ext    2x10    SLR    2x10ea        Bridges 2x10           Seated on ball tband rows                                  Pulls                               rotations   1x15; 2x15 single leg  1x15; 2x15 single leg  2x15 ea  Black JS band    Horizontal abd      2x15  Black JS band                                                                      Other Therapeutic Activities:      Home Exercise Program:      Manual Treatments:      Modalities:      Timed Code Treatment Minutes:       Total Treatment Minutes:      Treatment/Activity Tolerance:  [x] Patient tolerated treatment well [] Patient limited by fatigue  [] Patient limited by pain  [] Patient limited by other medical complications  [] Other:  Cont there ex to increase AROM/strength across trunk region. Cont core stabilization exercise- did increase band resistance per pt tolerance. Added horizontal abd to promote erect posturing. Good technique/form throughout.        Plan:   [x] Continue per plan of care [] Alter current plan (see comments)  [] Plan of care initiated [] Hold pending MD visit [] Discharge  Plan for Next Session:         Treatment Charges: Mins Units   Initial Evaluation     Re-Evaluation     Ther Exercise         TE 40 2   Manual Therapy     MT     Ther Activities        TA     Gait Training          GT     Neuro Re-education NR     Modalities     Non-Billable Service Time     Other     Total Time/Units 40 2         Electronically signed by:  Candia Fleischer, PT

## 2022-11-21 ENCOUNTER — OFFICE VISIT (OUTPATIENT)
Dept: PRIMARY CARE CLINIC | Age: 62
End: 2022-11-21
Payer: MEDICARE

## 2022-11-21 VITALS
WEIGHT: 224 LBS | TEMPERATURE: 97.5 F | SYSTOLIC BLOOD PRESSURE: 132 MMHG | BODY MASS INDEX: 32.14 KG/M2 | HEART RATE: 57 BPM | OXYGEN SATURATION: 97 % | DIASTOLIC BLOOD PRESSURE: 86 MMHG

## 2022-11-21 DIAGNOSIS — I10 ESSENTIAL HYPERTENSION: ICD-10-CM

## 2022-11-21 DIAGNOSIS — E11.9 TYPE 2 DIABETES MELLITUS WITHOUT COMPLICATION, WITHOUT LONG-TERM CURRENT USE OF INSULIN (HCC): ICD-10-CM

## 2022-11-21 DIAGNOSIS — Z12.5 SCREENING PSA (PROSTATE SPECIFIC ANTIGEN): ICD-10-CM

## 2022-11-21 DIAGNOSIS — Z23 NEED FOR INFLUENZA VACCINATION: ICD-10-CM

## 2022-11-21 DIAGNOSIS — Z00.00 MEDICARE ANNUAL WELLNESS VISIT, SUBSEQUENT: Primary | ICD-10-CM

## 2022-11-21 LAB
ALBUMIN SERPL-MCNC: 4.6 G/DL (ref 3.5–5.2)
ALP BLD-CCNC: 102 U/L (ref 40–129)
ALT SERPL-CCNC: 43 U/L (ref 0–40)
ANION GAP SERPL CALCULATED.3IONS-SCNC: 8 MMOL/L (ref 7–16)
AST SERPL-CCNC: 43 U/L (ref 0–39)
BILIRUB SERPL-MCNC: 0.9 MG/DL (ref 0–1.2)
BUN BLDV-MCNC: 12 MG/DL (ref 6–23)
CALCIUM SERPL-MCNC: 10 MG/DL (ref 8.6–10.2)
CHLORIDE BLD-SCNC: 103 MMOL/L (ref 98–107)
CO2: 28 MMOL/L (ref 22–29)
CREAT SERPL-MCNC: 1 MG/DL (ref 0.7–1.2)
GFR SERPL CREATININE-BSD FRML MDRD: >60 ML/MIN/1.73
GLUCOSE BLD-MCNC: 120 MG/DL (ref 74–99)
HBA1C MFR BLD: 6.9 % (ref 4–5.6)
HCT VFR BLD CALC: 45.7 % (ref 37–54)
HEMOGLOBIN: 15.2 G/DL (ref 12.5–16.5)
MCH RBC QN AUTO: 29.5 PG (ref 26–35)
MCHC RBC AUTO-ENTMCNC: 33.3 % (ref 32–34.5)
MCV RBC AUTO: 88.6 FL (ref 80–99.9)
PDW BLD-RTO: 13.2 FL (ref 11.5–15)
PLATELET # BLD: 227 E9/L (ref 130–450)
PMV BLD AUTO: 10.3 FL (ref 7–12)
POTASSIUM SERPL-SCNC: 5 MMOL/L (ref 3.5–5)
PROSTATE SPECIFIC ANTIGEN: 0.84 NG/ML (ref 0–4)
RBC # BLD: 5.16 E12/L (ref 3.8–5.8)
SODIUM BLD-SCNC: 139 MMOL/L (ref 132–146)
TOTAL PROTEIN: 7.4 G/DL (ref 6.4–8.3)
WBC # BLD: 7.1 E9/L (ref 4.5–11.5)

## 2022-11-21 PROCEDURE — 3078F DIAST BP <80 MM HG: CPT | Performed by: FAMILY MEDICINE

## 2022-11-21 PROCEDURE — 3074F SYST BP LT 130 MM HG: CPT | Performed by: FAMILY MEDICINE

## 2022-11-21 PROCEDURE — 3017F COLORECTAL CA SCREEN DOC REV: CPT | Performed by: FAMILY MEDICINE

## 2022-11-21 PROCEDURE — 2022F DILAT RTA XM EVC RTNOPTHY: CPT | Performed by: FAMILY MEDICINE

## 2022-11-21 PROCEDURE — 1036F TOBACCO NON-USER: CPT | Performed by: FAMILY MEDICINE

## 2022-11-21 PROCEDURE — 3051F HG A1C>EQUAL 7.0%<8.0%: CPT | Performed by: FAMILY MEDICINE

## 2022-11-21 PROCEDURE — G0008 ADMIN INFLUENZA VIRUS VAC: HCPCS | Performed by: FAMILY MEDICINE

## 2022-11-21 PROCEDURE — 90674 CCIIV4 VAC NO PRSV 0.5 ML IM: CPT | Performed by: FAMILY MEDICINE

## 2022-11-21 PROCEDURE — G8427 DOCREV CUR MEDS BY ELIG CLIN: HCPCS | Performed by: FAMILY MEDICINE

## 2022-11-21 PROCEDURE — G8417 CALC BMI ABV UP PARAM F/U: HCPCS | Performed by: FAMILY MEDICINE

## 2022-11-21 PROCEDURE — G0439 PPPS, SUBSEQ VISIT: HCPCS | Performed by: FAMILY MEDICINE

## 2022-11-21 PROCEDURE — 99213 OFFICE O/P EST LOW 20 MIN: CPT | Performed by: FAMILY MEDICINE

## 2022-11-21 PROCEDURE — G8482 FLU IMMUNIZE ORDER/ADMIN: HCPCS | Performed by: FAMILY MEDICINE

## 2022-11-21 ASSESSMENT — ENCOUNTER SYMPTOMS
BLURRED VISION: 0
DIARRHEA: 0
VISUAL CHANGE: 0
CONSTIPATION: 0
SHORTNESS OF BREATH: 0
BLOOD IN STOOL: 0

## 2022-11-21 ASSESSMENT — PATIENT HEALTH QUESTIONNAIRE - PHQ9
1. LITTLE INTEREST OR PLEASURE IN DOING THINGS: 0
2. FEELING DOWN, DEPRESSED OR HOPELESS: 0
SUM OF ALL RESPONSES TO PHQ QUESTIONS 1-9: 0
SUM OF ALL RESPONSES TO PHQ9 QUESTIONS 1 & 2: 0
SUM OF ALL RESPONSES TO PHQ QUESTIONS 1-9: 0

## 2022-11-21 ASSESSMENT — LIFESTYLE VARIABLES
HOW MANY STANDARD DRINKS CONTAINING ALCOHOL DO YOU HAVE ON A TYPICAL DAY: 1 OR 2
HOW OFTEN DO YOU HAVE A DRINK CONTAINING ALCOHOL: MONTHLY OR LESS

## 2022-11-21 NOTE — PATIENT INSTRUCTIONS
Personalized Preventive Plan for Benton David - 11/21/2022  Medicare offers a range of preventive health benefits. Some of the tests and screenings are paid in full while other may be subject to a deductible, co-insurance, and/or copay. Some of these benefits include a comprehensive review of your medical history including lifestyle, illnesses that may run in your family, and various assessments and screenings as appropriate. After reviewing your medical record and screening and assessments performed today your provider may have ordered immunizations, labs, imaging, and/or referrals for you. A list of these orders (if applicable) as well as your Preventive Care list are included within your After Visit Summary for your review. Other Preventive Recommendations:    A preventive eye exam performed by an eye specialist is recommended every 1-2 years to screen for glaucoma; cataracts, macular degeneration, and other eye disorders. A preventive dental visit is recommended every 6 months. Try to get at least 150 minutes of exercise per week or 10,000 steps per day on a pedometer . Order or download the FREE \"Exercise & Physical Activity: Your Everyday Guide\" from [employer]. Call 2-265.304.4900 or search [employer] online. You need 5243-0349 mg of calcium and 6856-5497 IU of vitamin D per day. It is possible to meet your calcium requirement with diet alone, but a vitamin D supplement is usually necessary to meet this goal.  When exposed to the sun, use a sunscreen that protects against both UVA and UVB radiation with an SPF of 30 or greater. Reapply every 2 to 3 hours or after sweating, drying off with a towel, or swimming. Always wear a seat belt when traveling in a car. Always wear a helmet when riding a bicycle or motorcycle.

## 2022-11-21 NOTE — PROGRESS NOTES
Medicare Annual Wellness Visit    Kellie Henriquez is here for Medicare AWV    Assessment & Plan   Medicare annual wellness visit, subsequent  Essential hypertension  -     CBC; Future  Type 2 diabetes mellitus without complication, without long-term current use of insulin ([facility])  -     Comprehensive Metabolic Panel; Future  -     Hemoglobin A1C; Future  Screening PSA (prostate specific antigen)  -     PSA Screening; Future  Need for influenza vaccination  -     Influenza, FLUCELVAX, (age 10 mo+), IM, PF, 0.5 mL    Recommendations for Preventive Services Due: see orders and patient instructions/AVS.  Recommended screening schedule for the next 5-10 years is provided to the patient in written form: see Patient Instructions/AVS.     Return for Medicare Annual Wellness Visit in 1 year, based on lab results. Subjective   The following acute and/or chronic problems were also addressed today:    Kellie Henriquez, a male of 57 y.o. came to the office 11/21/2022. Patient Active Problem List   Diagnosis    PAC (premature atrial contraction)    Sarcoidosis    Syncope    HTN (hypertension)    Dual implantable cardioverter-defibrillator in situ    Hyperlipemia    Degenerative disc disease, cervical    Ureteral calculus, right    Gastroesophageal reflux disease with esophagitis    Type 2 diabetes mellitus without complication, without long-term current use of insulin ([facility] [address]          Diabetes  He presents for his follow-up diabetic visit. He has type 2 diabetes mellitus. His disease course has been stable. Pertinent negatives for hypoglycemia include no headaches. Pertinent negatives for diabetes include no blurred vision, no chest pain, no fatigue, no foot paresthesias, no foot ulcerations, no polydipsia, no polyuria, no visual change and no weight loss. Symptoms are stable. When asked about current treatments, none were reported. He is following a generally healthy diet. He participates in exercise intermittently.  An ACE inhibitor/angiotensin II receptor blocker is being taken. Hypertension  This is a chronic problem. The problem is controlled. Pertinent negatives include no blurred vision, chest pain, headaches, malaise/fatigue, palpitations, peripheral edema or shortness of breath. There are no compliance problems. Allergies   Allergen Reactions    Alcohol Other (See Comments)     Rum causes stomach pain and cramping. Current Outpatient Medications on File Prior to Visit   Medication Sig Dispense Refill    lisinopril (PRINIVIL;ZESTRIL) 20 MG tablet Take 1 tablet by mouth daily 90 tablet 1    pantoprazole (PROTONIX) 20 MG tablet Take 1 tablet by mouth daily 90 tablet 1    zinc gluconate 50 MG tablet Take 100 mg by mouth daily      ibuprofen (ADVIL;MOTRIN) 800 MG tablet Take 1 tablet by mouth every 8 hours as needed for Pain With food 30 tablet 1    NIACIN PO Take 2 capsules by mouth daily      aspirin 81 MG tablet Take 81 mg by mouth daily      TURMERIC PO Take by mouth nightly        No current facility-administered medications on file prior to visit. Review of Systems   Constitutional:  Negative for fatigue, malaise/fatigue and weight loss. Eyes:  Negative for blurred vision. Respiratory:  Negative for shortness of breath. Cardiovascular:  Negative for chest pain, palpitations and leg swelling. Gastrointestinal:  Negative for blood in stool, constipation and diarrhea. Endocrine: Negative for polydipsia and polyuria. Genitourinary: Negative. Neurological:  Negative for headaches. other review of systems reviewed and are negative    OBJECTIVE:  /86   Pulse 57   Temp 97.5 °F (36.4 °C)   Wt 224 lb (101.6 kg)   SpO2 97%   BMI 32.14 kg/m²      Physical Exam see below    ASSESSMENT AND PLAN:    Adriel Ashraf was seen today for medicare aw. Diagnoses and all orders for this visit:    Medicare annual wellness visit, subsequent    Essential hypertension  -     CBC;  Future  - bp stable on med, if cough bothersome call     Type 2 diabetes mellitus without complication, without long-term current use of insulin ([facility])  -     Comprehensive Metabolic Panel; Future  -     Hemoglobin A1C; Future  - low carb diet but may need med if a1c not at goal.    Screening PSA (prostate specific antigen)  -     PSA Screening; Future    Other orders  -     Influenza, FLUCELVAX, (age 10 mo+), IM, PF, 0.5 mL      Return for Medicare Annual Wellness Visit in 1 year, based on lab results. Andrea Berger,         Patient's complete Health Risk Assessment and screening values have been reviewed and are found in Flowsheets. The following problems were reviewed today and where indicated follow up appointments were made and/or referrals ordered. Positive Risk Factor Screenings with Interventions:    Fall Risk:  Do you feel unsteady or are you worried about falling? : no  2 or more falls in past year?: no  Fall with injury in past year?: (!) yes   Fall Risk Interventions:    Due to having flu while in [place] due to dehydration.              General Health and ACP:  General  In general, how would you say your health is?: Fair  In the past 7 days, have you experienced any of the following: New or Increased Pain, New or Increased Fatigue, Loneliness, Social Isolation, Stress or Anger?: No  Do you get the social and emotional support that you need?: Yes  Do you have a Living Will?: Yes    Advance Directives       Power of [address] [place] [address] Will ACP-Advance Directive ACP-Power of     Not on File Not on File Not on File Not on File        General Health Risk Interventions:      Health Habits/Nutrition:  Physical Activity: Insufficiently Active    Days of Exercise per Week: 1 day    Minutes of Exercise per Session: 10 min     Have you lost any weight without trying in the past 3 months?: No     Have you seen the dentist within the past year?: Yes  Health Habits/Nutrition Interventions:      Hearing/Vision:  Do you or your family notice any trouble with your hearing that hasn't been managed with hearing aids?: No  Do you have difficulty driving, watching TV, or doing any of your daily activities because of your eyesight?: No  Have you had an eye exam within the past year?: (!) No  No results found. Hearing/Vision Interventions:  Vision concerns:  patient encouraged to make appointment with his/her eye specialist            Objective   Vitals:    11/21/22 0902   BP: 132/86   Pulse: 57   Temp: 97.5 °F (36.4 °C)   SpO2: 97%   Weight: 224 lb (101.6 kg)      Body mass index is 32.14 kg/m². General Appearance: alert and oriented to person, place and time, well developed and well- nourished, in no acute distress  Skin: warm and dry, no rash or erythema  Head: normocephalic and atraumatic  Eyes: pupils equal, round, and reactive to light, extraocular eye movements intact, conjunctivae normal  ENT: tympanic membrane, external ear and ear canal normal bilaterally, nose without deformity, nasal mucosa and turbinates normal without polyps  Neck: supple and non-tender without mass, no thyromegaly or thyroid nodules, no cervical lymphadenopathy  Pulmonary/Chest: clear to auscultation bilaterally- no wheezes, rales or rhonchi, normal air movement, no respiratory distress  Cardiovascular: normal rate, regular rhythm, normal S1 and S2, no murmurs, rubs, clicks, or gallops, distal pulses intact, no carotid bruits  Abdomen: soft, non-tender, non-distended, normal bowel sounds, no masses or organomegaly  Extremities: no cyanosis, clubbing or edema  Musculoskeletal: normal range of motion, no joint swelling, deformity or tenderness  Neurologic: reflexes normal and symmetric, no cranial nerve deficit, gait, coordination and speech normal       Allergies   Allergen Reactions    Alcohol Other (See Comments)     Rum causes stomach pain and cramping. Prior to Visit Medications    Medication Sig Taking?  Authorizing Provider   dexamethasone (DECADRON) 0.75 MG tablet One pill 3 times a day for 3 days then one twice a day for 2 days then once a day for 1 day Yes Liban Berger DO   lisinopril (PRINIVIL;ZESTRIL) 20 MG tablet Take 1 tablet by mouth daily  Liban Berger DO   pantoprazole (PROTONIX) 20 MG tablet Take 1 tablet by mouth daily  Liban Berger DO   zinc gluconate 50 MG tablet Take 100 mg by mouth daily  Historical Provider, MD   ibuprofen (ADVIL;MOTRIN) 800 MG tablet Take 1 tablet by mouth every 8 hours as needed for Pain With food  Liban Berger DO   NIACIN PO Take 2 capsules by mouth daily  Historical Provider, MD   aspirin 81 MG tablet Take 81 mg by mouth daily  Historical Provider, MD   TURMERIC PO Take by mouth nightly   Historical Provider, MD Michele (Including outside providers/suppliers regularly involved in providing care):   Patient Care Team:  Juliet Gordon DO as PCP - General (Family Medicine)  Slickmontran Gordon DO as PCP - REHABILITATION [facility] Empaneled Provider  Bro Alanis DO as Consulting Physician (Electrophysiology)  Arminda Rolle DO as Surgeon (Orthopedic Surgery)  Jovany Bruner DO as Consulting Physician (Electrophysiology)     Reviewed and updated this visit:  Tobacco  Allergies  Meds  Problems  Med Hx  Surg Hx  Soc Hx  Fam Hx

## 2022-12-27 DIAGNOSIS — I10 ESSENTIAL HYPERTENSION: ICD-10-CM

## 2022-12-28 RX ORDER — LISINOPRIL 20 MG/1
20 TABLET ORAL DAILY
Qty: 90 TABLET | Refills: 1 | Status: SHIPPED | OUTPATIENT
Start: 2022-12-28

## 2022-12-28 NOTE — TELEPHONE ENCOUNTER
[facility] CLINICAL PHARMACY: ADHERENCE REVIEW  Identified care gap per [place]: fills at [facility]: ACE/ARB adherence    Last Visit: 11/21/22    ASSESSMENT  STATIN ADHERENCE    FAIL DATE 12/28/22  LISINOPRIL TAB 20MG last filled on 12/28/22 for 90 day supply. Next refill due: 11/18/22    Per  Pharmacy:   LISINOPRIL TAB 20MG last picked up on 08/20/22 for 90 day supply. 0 refills remaining. Billed through [employer] Value Date    CHOL 215 (H) 05/25/2022    TRIG 239 (H) 05/25/2022    HDL 40 05/25/2022    LDLCALC 127 (H) 05/25/2022     ALT   Date Value Ref Range Status   11/21/2022 43 (H) 0 - 40 U/L Final     AST   Date Value Ref Range Status   11/21/2022 43 (H) 0 - 39 U/L Final     The 10-year ASCVD risk score (Kelley BECKWITH, et al., 2019) is: 30.7%    Values used to calculate the score:      Age: 57 years      Sex: Male      Is Non- : No      Diabetic: Yes      Tobacco smoker: No      Systolic Blood Pressure: 351 mmHg      Is BP treated: Yes      HDL Cholesterol: 40 mg/dL      Total Cholesterol: 215 mg/dL     PLAN  The following are interventions that have been identified:   - Patient overdue refilling LISINOPRIL TAB 20MG and active on home medication list.     Attempting to reach patient to review. Left message asking for return call. Called pt to discuss adherence for Lisinopril.  LF in August      Future Appointments   Date Time Provider Shawnee Pan   11/21/2023  8:15 AM DO Juan Antonio Ontiveros [address] [place] [address]   [address] [place] [address]  // Department, toll free [phone], Option 2
Dr. Roxy Hoyos, DO,     Pharmacy out of lisinopril refills. Order pended for your convenience. Last visit: 2022, Next visit: 2023    See encounter note(s) below for complete details. Please let me know if you have any questions. Thank you,  Abida Padilla PharmD, BCACP, [address] [place] [address], toll free: [phone], option 1    =======================================================    POPULATION HEALTH CLINICAL PHARMACY REVIEW: [facility]  Records claims through 2022 (YTD Angelo Sonandra =  81%; Potential Fail Date: 2022 ):   Lisinopril 20mg daily last filled on 2022 for 90 day supply. Next refill due: 2022    Per [employer] Portal: same as above    Last Rx sent on 2022 for 90ds with 1 refill    Per Reconciled Dispense Report as of 2022:  Filled for 90ds on 2022, 2022 - needs refill     BP Readings from Last 3 Encounters:   22 132/86   22 (!) 140/80   22 (!) 150/90     Lab Results   Component Value Date    CREATININE 1.0 2022     Estimated Creatinine Clearance: 89 mL/min (based on SCr of 1 mg/dL). Lab Results   Component Value Date/Time    LABGLOM >60 2022 12:00 PM     Will send refill request to PCP.      Abida Padilla PharmD, [address], [address] [place] [address], toll free: [phone], option 1    =======================================================    For Pharmacy Admin Tracking Only  Program: [address] in place:  No  Recommendation Provided To: Provider: 1 via Note to Provider  Intervention Detail: Adherence Monitorin and Refill(s) Provided  Intervention Accepted By: Provider: 1  Gap Closed?: No   Time Spent (min): 30
Never smoker

## 2023-01-09 RX ORDER — PANTOPRAZOLE SODIUM 20 MG/1
20 TABLET, DELAYED RELEASE ORAL DAILY
Qty: 90 TABLET | Refills: 1 | Status: SHIPPED | OUTPATIENT
Start: 2023-01-09

## 2023-02-14 DIAGNOSIS — I10 ESSENTIAL HYPERTENSION: ICD-10-CM

## 2023-02-16 RX ORDER — LISINOPRIL 20 MG/1
20 TABLET ORAL DAILY
Qty: 90 TABLET | Refills: 1 | Status: SHIPPED | OUTPATIENT
Start: 2023-02-16

## 2023-02-16 RX ORDER — PANTOPRAZOLE SODIUM 20 MG/1
20 TABLET, DELAYED RELEASE ORAL DAILY
Qty: 90 TABLET | Refills: 1 | Status: SHIPPED | OUTPATIENT
Start: 2023-02-16

## 2023-02-20 DIAGNOSIS — I10 ESSENTIAL HYPERTENSION: ICD-10-CM

## 2023-02-20 RX ORDER — LISINOPRIL 20 MG/1
20 TABLET ORAL DAILY
Qty: 90 TABLET | Refills: 1 | Status: SHIPPED | OUTPATIENT
Start: 2023-02-20

## 2023-02-23 DIAGNOSIS — I10 ESSENTIAL HYPERTENSION: ICD-10-CM

## 2023-02-23 RX ORDER — LISINOPRIL 20 MG/1
20 TABLET ORAL DAILY
Qty: 90 TABLET | Refills: 1 | OUTPATIENT
Start: 2023-02-23

## 2023-03-09 DIAGNOSIS — I10 ESSENTIAL HYPERTENSION: ICD-10-CM

## 2023-03-09 RX ORDER — LISINOPRIL 20 MG/1
20 TABLET ORAL DAILY
Qty: 90 TABLET | Refills: 1 | Status: SHIPPED | OUTPATIENT
Start: 2023-03-09

## 2023-09-29 PROCEDURE — 93296 REM INTERROG EVL PM/IDS: CPT | Performed by: STUDENT IN AN ORGANIZED HEALTH CARE EDUCATION/TRAINING PROGRAM

## 2023-09-29 PROCEDURE — 93295 DEV INTERROG REMOTE 1/2/MLT: CPT | Performed by: STUDENT IN AN ORGANIZED HEALTH CARE EDUCATION/TRAINING PROGRAM

## 2023-10-22 ENCOUNTER — PATIENT MESSAGE (OUTPATIENT)
Dept: PRIMARY CARE CLINIC | Age: 63
End: 2023-10-22

## 2023-10-22 DIAGNOSIS — I65.22 STENOSIS OF LEFT CAROTID ARTERY: Primary | ICD-10-CM

## 2023-10-23 NOTE — TELEPHONE ENCOUNTER
From: Gautam Polo  To: Dr. Mcgraw Army: 10/22/2023 10:17 AM EDT  Subject: Vascular Surgery referral    Good morning Jules Medina. Arleen Sanz here. To update you. .. Reza Alejandro ended up back in the hospital via ambulance at about 2:00 on Saturday morning. He lost full movement in his left leg and arm. The care team here at [facility] said they will be calling you again on Monday. I'm trying to navigate my way through all the next steps but I thought it best to start with you since you are the core of it all. They told us they are arranging for referrals for vascular surgery on the Left Side Carotid Artery, Neurology and PT. I'd really like for him to go to [facility] for Vascular and Neurological. Can you do that for us? We don't have any names or knowledge of anyone specific however two family doctors have said to go there and no where else. So I believe them. As far a PT goes. I'd like that to be local if possible just for ease and consistency. But we want aggressive, good and stroke rehab focused. Reza Alejandro worked with a daniel named Claudia Hayes who was out at the [facility] in [facility] and he liked him. .. I'm just not sure if stroke rehab is his world. Any chance you can point us in the right direction there too. I just don't want waste any time trying to figure out the logistics or interviewing potentials that may not be right. I know I'm being a bit bossy here but you should know me by now and know that this is the most important thing in my life right now and I just want to get it started right. Thanks Jules Medina!     Arleen Sanz - [phone]

## 2023-10-31 ENCOUNTER — TELEPHONE (OUTPATIENT)
Dept: PHARMACY | Facility: CLINIC | Age: 63
End: 2023-10-31

## 2023-10-31 NOTE — TELEPHONE ENCOUNTER
[facility] CLINICAL PHARMACY: ADHERENCE REVIEW  Identified care gap per [employer]: fills at [facility]: ACE/ARB adherence    ASSESSMENT    ACE/ARB ADHERENCE    Insurance Records claims through 10/23/2023 (Prior Year  [address] = not reported; YTD  [address] = 78%; Potential Fail Date: 23): Lisinopril 20mg last filled on 23 for 90 day supply. Next refill due: 23    Prescribed si tablet/capsule daily    Per Reconcile Dispense Historysame as above    Per  Pharmacy:  not contacted    BP Readings from Last 3 Encounters:   22 132/86   22 (!) 140/80   22 (!) 150/90     CrCl cannot be calculated (Patient's most recent lab result is older than the maximum 180 days allowed. ). Lab Results   Component Value Date    CREATININE 1.0 2022     Lab Results   Component Value Date    K 5.0 2022       PLAN  Per insurer report, LIS-0 - co-pays are based on tiers and patient is subject to coverage gap. The following are interventions that have been identified:   Admitted for TIA 10/19, received asa and plavix (without loading dose) and was discharged home on 10/20 with instructions to hold lisinopril. Last Visit: 22  Next Visit: 23      Cori Rico CPhT.    [facility] free:  [address] Only    Program: Jennifer in place:  No  Time Spent (min): 15

## 2023-11-14 ENCOUNTER — TELEPHONE (OUTPATIENT)
Dept: NON INVASIVE DIAGNOSTICS | Age: 63
End: 2023-11-14

## 2023-11-14 NOTE — TELEPHONE ENCOUNTER
----- Message from Kathy Verdugo RN sent at 11/14/2023 11:48 AM EST -----  Regarding: RE: STEM question  Just spoke with adonis. Have physical therapy contact [facility] services 1-800 cardiac. Since there are so many stem stimulation equipment.   ----- Message -----  From: Bakari Polo, [address]  Sent: 11/14/2023   8:39 AM EST  To: Kathy Verdugo RN; Helen Iniguez RN  Subject: STEM question                                    \"ptn had a stroke. said they want to perform stems stimulation on ptn arm and leg but ptn has a defibrillator. wants to know if they can still use stems device even without having defibrillator or can trhey just put magnet on defibrillator to turn it off while they do stems process\". This came over through [facility] and is a TB patient. Can you assist with this or does it need to go to [facility]?  Thanks,    Electronically signed by Bakari Polo MA on 11/14/2023 at 8:38 AM

## 2023-11-14 NOTE — TELEPHONE ENCOUNTER
LM to call office with [employer] recommendation.     Electronically signed by Dequan Campbell MA on 11/14/2023 at 1:21 PM

## 2023-11-21 ENCOUNTER — OFFICE VISIT (OUTPATIENT)
Dept: PRIMARY CARE CLINIC | Age: 63
End: 2023-11-21
Payer: MEDICARE

## 2023-11-21 VITALS
TEMPERATURE: 97.8 F | WEIGHT: 206 LBS | OXYGEN SATURATION: 96 % | HEART RATE: 58 BPM | SYSTOLIC BLOOD PRESSURE: 130 MMHG | BODY MASS INDEX: 29.49 KG/M2 | RESPIRATION RATE: 14 BRPM | HEIGHT: 70 IN | DIASTOLIC BLOOD PRESSURE: 86 MMHG

## 2023-11-21 DIAGNOSIS — I65.22 STENOSIS OF LEFT CAROTID ARTERY: ICD-10-CM

## 2023-11-21 DIAGNOSIS — E11.9 TYPE 2 DIABETES MELLITUS WITHOUT COMPLICATION, WITHOUT LONG-TERM CURRENT USE OF INSULIN (HCC): ICD-10-CM

## 2023-11-21 DIAGNOSIS — I63.20 CEREBROVASCULAR ACCIDENT (CVA) DUE TO OCCLUSION OF PRECEREBRAL ARTERY (HCC): Primary | ICD-10-CM

## 2023-11-21 PROCEDURE — 3017F COLORECTAL CA SCREEN DOC REV: CPT | Performed by: FAMILY MEDICINE

## 2023-11-21 PROCEDURE — G8417 CALC BMI ABV UP PARAM F/U: HCPCS | Performed by: FAMILY MEDICINE

## 2023-11-21 PROCEDURE — 1123F ACP DISCUSS/DSCN MKR DOCD: CPT | Performed by: FAMILY MEDICINE

## 2023-11-21 PROCEDURE — 3075F SYST BP GE 130 - 139MM HG: CPT | Performed by: FAMILY MEDICINE

## 2023-11-21 PROCEDURE — G8427 DOCREV CUR MEDS BY ELIG CLIN: HCPCS | Performed by: FAMILY MEDICINE

## 2023-11-21 PROCEDURE — 2022F DILAT RTA XM EVC RTNOPTHY: CPT | Performed by: FAMILY MEDICINE

## 2023-11-21 PROCEDURE — 3046F HEMOGLOBIN A1C LEVEL >9.0%: CPT | Performed by: FAMILY MEDICINE

## 2023-11-21 PROCEDURE — 3079F DIAST BP 80-89 MM HG: CPT | Performed by: FAMILY MEDICINE

## 2023-11-21 PROCEDURE — 99214 OFFICE O/P EST MOD 30 MIN: CPT | Performed by: FAMILY MEDICINE

## 2023-11-21 PROCEDURE — G8484 FLU IMMUNIZE NO ADMIN: HCPCS | Performed by: FAMILY MEDICINE

## 2023-11-21 PROCEDURE — 1036F TOBACCO NON-USER: CPT | Performed by: FAMILY MEDICINE

## 2023-11-21 RX ORDER — LANOLIN ALCOHOL/MO/W.PET/CERES
3 CREAM (GRAM) TOPICAL DAILY
COMMUNITY

## 2023-11-21 RX ORDER — SENNOSIDES 8.6 MG
650 CAPSULE ORAL EVERY 8 HOURS PRN
COMMUNITY

## 2023-11-21 RX ORDER — SEMAGLUTIDE 1.34 MG/ML
INJECTION, SOLUTION SUBCUTANEOUS
Qty: 3 ML | Refills: 2 | Status: SHIPPED | OUTPATIENT
Start: 2023-11-21 | End: 2024-02-15

## 2023-11-21 RX ORDER — CLOPIDOGREL BISULFATE 75 MG/1
TABLET ORAL
COMMUNITY
Start: 2023-11-20

## 2023-11-21 RX ORDER — LIDOCAINE 4 G/G
1 PATCH TOPICAL DAILY
COMMUNITY

## 2023-11-21 RX ORDER — LANOLIN ALCOHOL/MO/W.PET/CERES
1000 CREAM (GRAM) TOPICAL DAILY
COMMUNITY

## 2023-11-21 RX ORDER — ATORVASTATIN CALCIUM 80 MG/1
TABLET, FILM COATED ORAL
COMMUNITY
Start: 2023-11-20

## 2023-11-21 SDOH — ECONOMIC STABILITY: FOOD INSECURITY: WITHIN THE PAST 12 MONTHS, YOU WORRIED THAT YOUR FOOD WOULD RUN OUT BEFORE YOU GOT MONEY TO BUY MORE.: NEVER TRUE

## 2023-11-21 SDOH — ECONOMIC STABILITY: FOOD INSECURITY: WITHIN THE PAST 12 MONTHS, THE FOOD YOU BOUGHT JUST DIDN'T LAST AND YOU DIDN'T HAVE MONEY TO GET MORE.: NEVER TRUE

## 2023-11-21 SDOH — ECONOMIC STABILITY: HOUSING INSECURITY
IN THE LAST 12 MONTHS, WAS THERE A TIME WHEN YOU DID NOT HAVE A STEADY PLACE TO SLEEP OR SLEPT IN A SHELTER (INCLUDING NOW)?: NO

## 2023-11-21 SDOH — ECONOMIC STABILITY: INCOME INSECURITY: HOW HARD IS IT FOR YOU TO PAY FOR THE VERY BASICS LIKE FOOD, HOUSING, MEDICAL CARE, AND HEATING?: NOT HARD AT ALL

## 2023-11-21 ASSESSMENT — PATIENT HEALTH QUESTIONNAIRE - PHQ9
SUM OF ALL RESPONSES TO PHQ QUESTIONS 1-9: 0
1. LITTLE INTEREST OR PLEASURE IN DOING THINGS: 0
2. FEELING DOWN, DEPRESSED OR HOPELESS: 0
SUM OF ALL RESPONSES TO PHQ QUESTIONS 1-9: 0
SUM OF ALL RESPONSES TO PHQ9 QUESTIONS 1 & 2: 0
SUM OF ALL RESPONSES TO PHQ QUESTIONS 1-9: 0
SUM OF ALL RESPONSES TO PHQ QUESTIONS 1-9: 0

## 2023-11-21 ASSESSMENT — ENCOUNTER SYMPTOMS
DIARRHEA: 0
VISUAL CHANGE: 0
SHORTNESS OF BREATH: 0
CHEST TIGHTNESS: 0
CONSTIPATION: 0
BLURRED VISION: 0

## 2024-01-01 PROCEDURE — 93296 REM INTERROG EVL PM/IDS: CPT | Performed by: STUDENT IN AN ORGANIZED HEALTH CARE EDUCATION/TRAINING PROGRAM

## 2024-01-01 PROCEDURE — 93295 DEV INTERROG REMOTE 1/2/MLT: CPT | Performed by: STUDENT IN AN ORGANIZED HEALTH CARE EDUCATION/TRAINING PROGRAM

## 2024-01-02 ENCOUNTER — TELEPHONE (OUTPATIENT)
Dept: NON INVASIVE DIAGNOSTICS | Age: 64
End: 2024-01-02

## 2024-01-15 DIAGNOSIS — I63.20 CEREBROVASCULAR ACCIDENT (CVA) DUE TO OCCLUSION OF PRECEREBRAL ARTERY (HCC): ICD-10-CM

## 2024-01-15 RX ORDER — CLOPIDOGREL BISULFATE 75 MG/1
75 TABLET ORAL DAILY
Qty: 30 TABLET | Refills: 3 | Status: SHIPPED | OUTPATIENT
Start: 2024-01-15

## 2024-01-18 ENCOUNTER — OFFICE VISIT (OUTPATIENT)
Dept: NON INVASIVE DIAGNOSTICS | Age: 64
End: 2024-01-18
Payer: MEDICARE

## 2024-01-18 VITALS
RESPIRATION RATE: 16 BRPM | SYSTOLIC BLOOD PRESSURE: 120 MMHG | DIASTOLIC BLOOD PRESSURE: 76 MMHG | HEIGHT: 70 IN | HEART RATE: 57 BPM | WEIGHT: 202 LBS | OXYGEN SATURATION: 97 % | BODY MASS INDEX: 28.92 KG/M2

## 2024-01-18 DIAGNOSIS — R94.31 EKG ABNORMALITIES: Primary | ICD-10-CM

## 2024-01-18 DIAGNOSIS — I10 HYPERTENSION, UNSPECIFIED TYPE: ICD-10-CM

## 2024-01-18 DIAGNOSIS — Z86.73 HISTORY OF CVA IN ADULTHOOD: ICD-10-CM

## 2024-01-18 DIAGNOSIS — D86.9 SARCOIDOSIS: ICD-10-CM

## 2024-01-18 DIAGNOSIS — I47.19 ATRIAL TACHYCARDIA: ICD-10-CM

## 2024-01-18 PROCEDURE — 1123F ACP DISCUSS/DSCN MKR DOCD: CPT | Performed by: NURSE PRACTITIONER

## 2024-01-18 PROCEDURE — 3074F SYST BP LT 130 MM HG: CPT | Performed by: NURSE PRACTITIONER

## 2024-01-18 PROCEDURE — 99215 OFFICE O/P EST HI 40 MIN: CPT | Performed by: NURSE PRACTITIONER

## 2024-01-18 PROCEDURE — G8427 DOCREV CUR MEDS BY ELIG CLIN: HCPCS | Performed by: NURSE PRACTITIONER

## 2024-01-18 PROCEDURE — 3017F COLORECTAL CA SCREEN DOC REV: CPT | Performed by: NURSE PRACTITIONER

## 2024-01-18 PROCEDURE — 93000 ELECTROCARDIOGRAM COMPLETE: CPT | Performed by: STUDENT IN AN ORGANIZED HEALTH CARE EDUCATION/TRAINING PROGRAM

## 2024-01-18 PROCEDURE — G8484 FLU IMMUNIZE NO ADMIN: HCPCS | Performed by: NURSE PRACTITIONER

## 2024-01-18 PROCEDURE — G8417 CALC BMI ABV UP PARAM F/U: HCPCS | Performed by: NURSE PRACTITIONER

## 2024-01-18 PROCEDURE — 3078F DIAST BP <80 MM HG: CPT | Performed by: NURSE PRACTITIONER

## 2024-01-18 PROCEDURE — 1036F TOBACCO NON-USER: CPT | Performed by: NURSE PRACTITIONER

## 2024-01-18 RX ORDER — METAPROTERENOL SULFATE 10 MG
500 TABLET ORAL DAILY
COMMUNITY

## 2024-01-18 NOTE — PROGRESS NOTES
[facility] Electrophysiology  Outpatient Progress Note  Jaciel Ureña  1960  Date of Service: 1/18/2024  PCP: Liban Berger DO  Electrophysiologist: Dr Hernandez         Subjective: Jaciel Ureña is seen for follow-up and management of: ICD    Last seen in the office with Oriana Tello, MARY - CNP on 9/22/2022  Dr Hernandez on 4/5/2021    PMH as noted below significant for cardiac/pulmonary sarcoidosis, VT status post dual-chamber ICD, syncope, hypertension, diabetes, obesity, gout, cervical spine surgery and GERD.  He was previously managed in [place] and had his ICD implant done in [place] in August 2008.  He has never had syncope or shocks in the past from his ICD.  He does not recall being told he has ever had a ventricular arrhythmia on his device either.  From a device standpoint, he has been doing well.  He follows remotely.  In October of last year, he was in [place] on vacation and suffered a CVA.  Reviewed records from [place] charts.  He was started on plavix. Caoritd stenosis and plan for vascular - seen [facility].  He had stroke affecting his left side and had to go to rehab for about a month to rehabilitate his strength.  He followed up with vascular [facility] and was noted to have carotid stenosis on the left side which is unrelated to stroke.      Prior cardiac testing:  TTE (5/21/20): LVEF = 65-70%, mild concentric LVH.  TTE (12/15/10): LVEF = 65%, normal.  TTE (11/20/09): LVEF = 65%, normal.  EP Study (8/7/08): polymorphic VT at S1-S2 of 400 msec and 230 msec.  TTE (7/24/08): LVEF = 65%, borderline LAE.  Event monitor (7/22/08): sinus rhythm with HR = 70-80 bpm, single isolated PAC, and no ventricular dysrhythmias.  Cardiac MRI (7/7/08): abnormal LGE of entire endocardial and myocardial RV, abnormal LGE of septum and apex of LV predominately endocardial; prominence of left hilum which may represent adenopathy; suggestive of cardiac

## 2024-01-19 PROBLEM — I47.19 ATRIAL TACHYCARDIA: Status: ACTIVE | Noted: 2024-01-19

## 2024-01-22 ENCOUNTER — OFFICE VISIT (OUTPATIENT)
Dept: PRIMARY CARE CLINIC | Age: 64
End: 2024-01-22
Payer: MEDICARE

## 2024-01-22 VITALS
TEMPERATURE: 97.2 F | HEART RATE: 72 BPM | HEIGHT: 70 IN | WEIGHT: 202 LBS | BODY MASS INDEX: 28.92 KG/M2 | OXYGEN SATURATION: 98 % | SYSTOLIC BLOOD PRESSURE: 110 MMHG | DIASTOLIC BLOOD PRESSURE: 70 MMHG

## 2024-01-22 DIAGNOSIS — M25.512 LEFT SHOULDER PAIN, UNSPECIFIED CHRONICITY: ICD-10-CM

## 2024-01-22 DIAGNOSIS — E11.9 TYPE 2 DIABETES MELLITUS WITHOUT COMPLICATION, WITHOUT LONG-TERM CURRENT USE OF INSULIN (HCC): ICD-10-CM

## 2024-01-22 DIAGNOSIS — I63.20 CEREBROVASCULAR ACCIDENT (CVA) DUE TO OCCLUSION OF PRECEREBRAL ARTERY (HCC): ICD-10-CM

## 2024-01-22 DIAGNOSIS — Z12.5 SCREENING PSA (PROSTATE SPECIFIC ANTIGEN): ICD-10-CM

## 2024-01-22 DIAGNOSIS — Z00.00 MEDICARE ANNUAL WELLNESS VISIT, SUBSEQUENT: Primary | ICD-10-CM

## 2024-01-22 LAB
ALBUMIN SERPL-MCNC: 4.9 G/DL (ref 3.5–5.2)
ALP BLD-CCNC: 96 U/L (ref 40–129)
ALT SERPL-CCNC: 28 U/L (ref 0–40)
ANION GAP SERPL CALCULATED.3IONS-SCNC: 18 MMOL/L (ref 7–16)
AST SERPL-CCNC: 27 U/L (ref 0–39)
BILIRUB SERPL-MCNC: 1 MG/DL (ref 0–1.2)
BUN BLDV-MCNC: 16 MG/DL (ref 6–23)
CALCIUM SERPL-MCNC: 10.3 MG/DL (ref 8.6–10.2)
CHLORIDE BLD-SCNC: 101 MMOL/L (ref 98–107)
CHOLESTEROL: 201 MG/DL
CO2: 21 MMOL/L (ref 22–29)
CREAT SERPL-MCNC: 0.9 MG/DL (ref 0.7–1.2)
CREATININE URINE: 258.9 MG/DL (ref 40–278)
GFR SERPL CREATININE-BSD FRML MDRD: >60 ML/MIN/1.73M2
GLUCOSE BLD-MCNC: 128 MG/DL (ref 74–99)
HBA1C MFR BLD: 6.5 % (ref 4–5.6)
HDLC SERPL-MCNC: 56 MG/DL
LDL CHOLESTEROL: 116 MG/DL
POTASSIUM SERPL-SCNC: 5.2 MMOL/L (ref 3.5–5)
PROSTATE SPECIFIC ANTIGEN: 0.63 NG/ML (ref 0–4)
SODIUM BLD-SCNC: 140 MMOL/L (ref 132–146)
TOTAL PROTEIN, URINE: 14 MG/DL (ref 0–12)
TOTAL PROTEIN: 7.6 G/DL (ref 6.4–8.3)
TRIGL SERPL-MCNC: 147 MG/DL
URINE TOTAL PROTEIN CREATININE RATIO: 0.05 (ref 0–0.2)
VLDLC SERPL CALC-MCNC: 29 MG/DL

## 2024-01-22 PROCEDURE — 3017F COLORECTAL CA SCREEN DOC REV: CPT | Performed by: FAMILY MEDICINE

## 2024-01-22 PROCEDURE — 3078F DIAST BP <80 MM HG: CPT | Performed by: FAMILY MEDICINE

## 2024-01-22 PROCEDURE — 1036F TOBACCO NON-USER: CPT | Performed by: FAMILY MEDICINE

## 2024-01-22 PROCEDURE — 99213 OFFICE O/P EST LOW 20 MIN: CPT | Performed by: FAMILY MEDICINE

## 2024-01-22 PROCEDURE — 2022F DILAT RTA XM EVC RTNOPTHY: CPT | Performed by: FAMILY MEDICINE

## 2024-01-22 PROCEDURE — 1123F ACP DISCUSS/DSCN MKR DOCD: CPT | Performed by: FAMILY MEDICINE

## 2024-01-22 PROCEDURE — 3046F HEMOGLOBIN A1C LEVEL >9.0%: CPT | Performed by: FAMILY MEDICINE

## 2024-01-22 PROCEDURE — 3074F SYST BP LT 130 MM HG: CPT | Performed by: FAMILY MEDICINE

## 2024-01-22 PROCEDURE — G8427 DOCREV CUR MEDS BY ELIG CLIN: HCPCS | Performed by: FAMILY MEDICINE

## 2024-01-22 PROCEDURE — G8417 CALC BMI ABV UP PARAM F/U: HCPCS | Performed by: FAMILY MEDICINE

## 2024-01-22 PROCEDURE — G8484 FLU IMMUNIZE NO ADMIN: HCPCS | Performed by: FAMILY MEDICINE

## 2024-01-22 PROCEDURE — G0439 PPPS, SUBSEQ VISIT: HCPCS | Performed by: FAMILY MEDICINE

## 2024-01-22 RX ORDER — MELOXICAM 7.5 MG/1
7.5 TABLET ORAL DAILY
Qty: 30 TABLET | Refills: 2 | Status: SHIPPED | OUTPATIENT
Start: 2024-01-22

## 2024-01-22 ASSESSMENT — PATIENT HEALTH QUESTIONNAIRE - PHQ9
SUM OF ALL RESPONSES TO PHQ QUESTIONS 1-9: 0
SUM OF ALL RESPONSES TO PHQ QUESTIONS 1-9: 0
1. LITTLE INTEREST OR PLEASURE IN DOING THINGS: 0
2. FEELING DOWN, DEPRESSED OR HOPELESS: 0
SUM OF ALL RESPONSES TO PHQ9 QUESTIONS 1 & 2: 0
SUM OF ALL RESPONSES TO PHQ QUESTIONS 1-9: 0
SUM OF ALL RESPONSES TO PHQ QUESTIONS 1-9: 0

## 2024-01-22 ASSESSMENT — LIFESTYLE VARIABLES
HOW MANY STANDARD DRINKS CONTAINING ALCOHOL DO YOU HAVE ON A TYPICAL DAY: PATIENT DOES NOT DRINK
HOW OFTEN DO YOU HAVE A DRINK CONTAINING ALCOHOL: NEVER

## 2024-01-22 NOTE — PROGRESS NOTES
Wt 91.6 kg (202 lb)   SpO2 98%   BMI 28.98 kg/m²      Physical Exam see below    ASSESSMENT AND PLAN:    Jaciel was seen today for medicare aw.    Diagnoses and all orders for this visit:    Medicare annual wellness visit, subsequent    Cerebrovascular accident (CVA) due to occlusion of precerebral artery (HCC)  -     External Referral To Neurology    Type 2 diabetes mellitus without complication, without long-term current use of insulin (HCC)  -     Comprehensive Metabolic Panel; Future  -     Hemoglobin A1C; Future  -     Lipid Panel; Future  -     Protein / creatinine ratio, urine; Future    Left shoulder pain, unspecified chronicity  -     XR SHOULDER LEFT (MIN 2 VIEWS); Future  -     meloxicam (MOBIC) 7.5 MG tablet; Take 1 tablet by mouth daily    Screening PSA (prostate specific antigen)  -     PSA Screening; Future    - continue PT and OT  - handicap parking placard    Return in 3 months (on 4/22/2024) for Medicare Annual Wellness Visit in 1 year, or for acute problem.    Liban Berger, DO          Patient's complete Health Risk Assessment and screening values have been reviewed and are found in Flowsheets. The following problems were reviewed today and where indicated follow up appointments were made and/or referrals ordered.    Positive Risk Factor Screenings with Interventions:    Fall Risk:  Do you feel unsteady or are you worried about falling? : (!) yes (patient had a stroke)  2 or more falls in past year?: no  Fall with injury in past year?: no     Interventions:    Patient comments: in PT and doing a little better.                                 Objective   Vitals:    01/22/24 0952   BP: 110/70   Pulse: 72   Temp: 97.2 °F (36.2 °C)   SpO2: 98%   Weight: 91.6 kg (202 lb)   Height: 1.778 m (5' 10\")      Body mass index is 28.98 kg/m².      General Appearance: alert and oriented to person, place and time, well developed and well- nourished, in no acute distress  Skin: warm and dry, no rash or

## 2024-01-24 ENCOUNTER — TELEPHONE (OUTPATIENT)
Dept: PRIMARY CARE CLINIC | Age: 64
End: 2024-01-24

## 2024-01-24 ENCOUNTER — PATIENT MESSAGE (OUTPATIENT)
Dept: PRIMARY CARE CLINIC | Age: 64
End: 2024-01-24

## 2024-01-24 NOTE — TELEPHONE ENCOUNTER
From: Jaciel Ureña  To: Dr. Liban Berger  Sent: 1/24/2024 2:01 PM EST  Subject: Shoulder and Cholesterol RX    Hi Franklin. Got your VM.   As for my shoulder, I think I'd like to do the shot so should I call and make an appointment or wait for your staff to call me for an appointment?    And as for the Cholesterol meds, I'm not a fan of the statins because of the side effects (mainly the flair up of my back pain and joint pain). Being able to move is pretty critical for me right now. However, so is not having another stroke. Are there options to the statins? Any insight you have is appreciated.    Thanks

## 2024-01-24 NOTE — TELEPHONE ENCOUNTER
Left message right shoulder x-ray shows some AC joint arthritic change.  If this continues to bother him I could put an injection at this site.    Cholesterol slightly elevated so due to his history of stroke I definitely recommend being on a statin.  Call if agrees.  Other labs stable..

## 2024-01-26 RX ORDER — ROSUVASTATIN CALCIUM 5 MG/1
5 TABLET, COATED ORAL DAILY
Qty: 30 TABLET | Refills: 2 | Status: SHIPPED | OUTPATIENT
Start: 2024-01-26

## 2024-02-09 ENCOUNTER — OFFICE VISIT (OUTPATIENT)
Dept: PRIMARY CARE CLINIC | Age: 64
End: 2024-02-09
Payer: MEDICARE

## 2024-02-09 VITALS
WEIGHT: 205 LBS | DIASTOLIC BLOOD PRESSURE: 84 MMHG | OXYGEN SATURATION: 97 % | RESPIRATION RATE: 18 BRPM | TEMPERATURE: 97.3 F | BODY MASS INDEX: 29.41 KG/M2 | SYSTOLIC BLOOD PRESSURE: 140 MMHG | HEART RATE: 67 BPM

## 2024-02-09 DIAGNOSIS — I63.20 CEREBROVASCULAR ACCIDENT (CVA) DUE TO OCCLUSION OF PRECEREBRAL ARTERY (HCC): ICD-10-CM

## 2024-02-09 DIAGNOSIS — M25.512 ACUTE PAIN OF LEFT SHOULDER: ICD-10-CM

## 2024-02-09 DIAGNOSIS — M25.9 SHOULDER JOINT DYSFUNCTION: Primary | ICD-10-CM

## 2024-02-09 PROCEDURE — G8427 DOCREV CUR MEDS BY ELIG CLIN: HCPCS | Performed by: FAMILY MEDICINE

## 2024-02-09 PROCEDURE — G8417 CALC BMI ABV UP PARAM F/U: HCPCS | Performed by: FAMILY MEDICINE

## 2024-02-09 PROCEDURE — 1036F TOBACCO NON-USER: CPT | Performed by: FAMILY MEDICINE

## 2024-02-09 PROCEDURE — 1123F ACP DISCUSS/DSCN MKR DOCD: CPT | Performed by: FAMILY MEDICINE

## 2024-02-09 PROCEDURE — 3079F DIAST BP 80-89 MM HG: CPT | Performed by: FAMILY MEDICINE

## 2024-02-09 PROCEDURE — 3017F COLORECTAL CA SCREEN DOC REV: CPT | Performed by: FAMILY MEDICINE

## 2024-02-09 PROCEDURE — G8484 FLU IMMUNIZE NO ADMIN: HCPCS | Performed by: FAMILY MEDICINE

## 2024-02-09 PROCEDURE — 99213 OFFICE O/P EST LOW 20 MIN: CPT | Performed by: FAMILY MEDICINE

## 2024-02-09 PROCEDURE — 3077F SYST BP >= 140 MM HG: CPT | Performed by: FAMILY MEDICINE

## 2024-02-09 NOTE — PROGRESS NOTES
Jaciel Ureña, a male of 64 y.o. came to the office 2/9/2024.     Patient Active Problem List   Diagnosis    PAC (premature atrial contraction)    Sarcoidosis    Syncope    HTN (hypertension)    Dual implantable cardioverter-defibrillator in situ    Hyperlipemia    Degenerative disc disease, cervical    Ureteral calculus, right    Gastroesophageal reflux disease with esophagitis    Type 2 diabetes mellitus without complication, without long-term current use of insulin (HCC)    Atrial tachycardia          Shoulder Pain   The pain is present in the left shoulder. This is a chronic problem. The current episode started more than 1 month ago. History of extremity trauma: hx of stroke. The problem occurs constantly. The problem has been unchanged. The quality of the pain is described as sharp and aching. The pain is at a severity of 6/10. The pain is moderate. Associated symptoms include joint locking, a limited range of motion, numbness, stiffness and tingling. The symptoms are aggravated by activity. He has tried NSAIDS and acetaminophen for the symptoms. The treatment provided mild relief.        Allergies   Allergen Reactions    Alcohol Other (See Comments)     Rum causes stomach pain and cramping.          Current Outpatient Medications on File Prior to Visit   Medication Sig Dispense Refill    rosuvastatin (CRESTOR) 5 MG tablet Take 1 tablet by mouth daily 30 tablet 2    meloxicam (MOBIC) 7.5 MG tablet Take 1 tablet by mouth daily 30 tablet 2    vitamin D 25 MCG (1000 UT) CAPS Take by mouth      Omega-3 Fatty Acids (OMEGA-3 FISH OIL) 500 MG CAPS Take 500 mg by mouth daily      DANDELION PO Take 15-75 mg by mouth daily      clopidogrel (PLAVIX) 75 MG tablet Take 1 tablet by mouth daily 30 tablet 3    vitamin B-12 (CYANOCOBALAMIN) 1000 MCG tablet Take 1 tablet by mouth daily      melatonin 3 MG TABS tablet Take 1 tablet by mouth as needed      lisinopril (PRINIVIL;ZESTRIL) 20 MG tablet Take 1 tablet by mouth daily 90

## 2024-02-16 ENCOUNTER — PATIENT MESSAGE (OUTPATIENT)
Dept: PRIMARY CARE CLINIC | Age: 64
End: 2024-02-16

## 2024-02-16 DIAGNOSIS — M21.372 LEFT FOOT DROP: Primary | ICD-10-CM

## 2024-02-16 NOTE — TELEPHONE ENCOUNTER
From: Jaciel Ureña  To: Dr. Liban Berger  Sent: 2/16/2024 3:32 PM EST  Subject: RX for Replacement AFO    Louis Reid. My AFO broke and [facility] said they need a prescription before they can replace it. Can you write one and I can swing by the office next week and pick it up?    Thanks

## 2024-03-04 ENCOUNTER — TELEPHONE (OUTPATIENT)
Dept: NON INVASIVE DIAGNOSTICS | Age: 64
End: 2024-03-04

## 2024-03-04 NOTE — TELEPHONE ENCOUNTER
Oriana Tello APRN - CNP Truhan, Tiffany, RN  Can we bring him in to lowe r rate for mode switch or can we do this on the websites?  He had stroke last year and only very short episodes <10 seconds in past of AFL/AT  Want to make sure we are not missing slower rates  MARY Rivera CNP    Called and spoke with Mr and Mrs Ureña. Patient was recently seen in [facility] and started on warfarin due to possible clotts noted on his echo.     Reviewed with Oriana Tello NP.   Patient does not need to come in for programming changes since he was started on Coumadin.     Plan: informed Mrs. Ureña will keep previously scheduled appointments.     Estela CARSON,RN   [facility] [place]   [facility]

## 2024-03-11 ENCOUNTER — OFFICE VISIT (OUTPATIENT)
Dept: PHYSICAL MEDICINE AND REHAB | Age: 64
End: 2024-03-11
Payer: MEDICARE

## 2024-03-11 VITALS
WEIGHT: 205 LBS | HEIGHT: 70 IN | BODY MASS INDEX: 29.35 KG/M2 | SYSTOLIC BLOOD PRESSURE: 132 MMHG | DIASTOLIC BLOOD PRESSURE: 102 MMHG | HEART RATE: 54 BPM | OXYGEN SATURATION: 96 %

## 2024-03-11 DIAGNOSIS — Z86.73 HISTORY OF STROKE: ICD-10-CM

## 2024-03-11 DIAGNOSIS — S43.002D ACQUIRED SUBLUXATION OF LEFT SHOULDER, SUBSEQUENT ENCOUNTER: ICD-10-CM

## 2024-03-11 DIAGNOSIS — G81.14 LEFT SPASTIC HEMIPARESIS (HCC): ICD-10-CM

## 2024-03-11 DIAGNOSIS — M25.512 CHRONIC LEFT SHOULDER PAIN: Primary | ICD-10-CM

## 2024-03-11 DIAGNOSIS — G89.29 CHRONIC LEFT SHOULDER PAIN: Primary | ICD-10-CM

## 2024-03-11 PROCEDURE — 3080F DIAST BP >= 90 MM HG: CPT | Performed by: STUDENT IN AN ORGANIZED HEALTH CARE EDUCATION/TRAINING PROGRAM

## 2024-03-11 PROCEDURE — 3075F SYST BP GE 130 - 139MM HG: CPT | Performed by: STUDENT IN AN ORGANIZED HEALTH CARE EDUCATION/TRAINING PROGRAM

## 2024-03-11 PROCEDURE — G8427 DOCREV CUR MEDS BY ELIG CLIN: HCPCS | Performed by: STUDENT IN AN ORGANIZED HEALTH CARE EDUCATION/TRAINING PROGRAM

## 2024-03-11 PROCEDURE — 99204 OFFICE O/P NEW MOD 45 MIN: CPT | Performed by: STUDENT IN AN ORGANIZED HEALTH CARE EDUCATION/TRAINING PROGRAM

## 2024-03-11 PROCEDURE — G8484 FLU IMMUNIZE NO ADMIN: HCPCS | Performed by: STUDENT IN AN ORGANIZED HEALTH CARE EDUCATION/TRAINING PROGRAM

## 2024-03-11 PROCEDURE — 1123F ACP DISCUSS/DSCN MKR DOCD: CPT | Performed by: STUDENT IN AN ORGANIZED HEALTH CARE EDUCATION/TRAINING PROGRAM

## 2024-03-11 PROCEDURE — 1036F TOBACCO NON-USER: CPT | Performed by: STUDENT IN AN ORGANIZED HEALTH CARE EDUCATION/TRAINING PROGRAM

## 2024-03-11 PROCEDURE — G8417 CALC BMI ABV UP PARAM F/U: HCPCS | Performed by: STUDENT IN AN ORGANIZED HEALTH CARE EDUCATION/TRAINING PROGRAM

## 2024-03-11 PROCEDURE — 3017F COLORECTAL CA SCREEN DOC REV: CPT | Performed by: STUDENT IN AN ORGANIZED HEALTH CARE EDUCATION/TRAINING PROGRAM

## 2024-03-11 RX ORDER — GLUCOSAMINE/D3/BOSWELLIA SERRA 1500MG-400
TABLET ORAL DAILY
COMMUNITY

## 2024-03-11 RX ORDER — WARFARIN SODIUM 5 MG/1
5 TABLET ORAL DAILY
COMMUNITY

## 2024-03-11 NOTE — PROGRESS NOTES
Elissa Cunningham MD  [facility]ANS [place] [facility] PHYSICAL MEDICINE AND REHABILITAION  [address]  [address]  [facility] [address]  Dept: [phone]  Loc: [phone]       3/18/2024  Consult requested by: Liban Berger* for :   Chief Complaint   Patient presents with    Shoulder Pain     Left shoulder pain, Hx of stroke in 10/2023.  Currently in PT, wearing left leg brace.  Gait is improving.  C/o stiffness      Primary care: Liban Berger,     An independent historian was not needed.    Hand dominance: RIGHT HAND     The patient was referred for an evaluation of left shoulder pain and left spastic hemiparesis.    Onset of CNS disorder: suddenly after 6 months prior - R corona radiata/BG stroke (unclear if this was on initial CT/repeat [facility] radiology reports)- happened while out of town, initially thought to be TIA, returned to ER due to worsening left hemiparesis and got ASA/plavix loading doses, allowed permissive HTN, managed otherwise non-operatively. Has cardiac device so unable to get MRI brain.  CTA did reveal stenosis left ICA - was planned to have Vascular Surgery follow up in [place] for management of this    He did acute rehab in [place] and was discharged home to [place]. Has continued PT/OT as outpatient at [facility]. There is residual left spastic hemiparesis.     Course: improving - wife reports that he was barely able to move his thumb after his stroke and now able to flex all fingers in the left hand - has started to do a lot of work around the house as well.      There has not been skin breakdown.     Spasticity has impacted the patient's treatment goals in the following ways:   [X] difficult to wash axilla - on the right side  [X] difficult to dress upper body  [X] abnormal arm swing with gait  [X] difficult to dress lower body  [X] difficult to don/wear shoes or brace  [X] difficulty with walking  [X] painful - left

## 2024-03-11 NOTE — PATIENT INSTRUCTIONS
- new order for PT to help with some of the shoulder symptoms  - Can try KT tape to the shoulder  - Can try voltaren gel to the shoulder  - Follow up in early April once you see your other doctors and we can do ultrasound injection of the subacromial bursa

## 2024-03-14 DIAGNOSIS — M25.512 LEFT SHOULDER PAIN, UNSPECIFIED CHRONICITY: ICD-10-CM

## 2024-03-14 RX ORDER — PANTOPRAZOLE SODIUM 20 MG/1
20 TABLET, DELAYED RELEASE ORAL DAILY
Qty: 90 TABLET | Refills: 1 | Status: SHIPPED | OUTPATIENT
Start: 2024-03-14

## 2024-03-14 RX ORDER — MELOXICAM 7.5 MG/1
7.5 TABLET ORAL DAILY
Qty: 30 TABLET | Refills: 2 | Status: SHIPPED | OUTPATIENT
Start: 2024-03-14

## 2024-04-01 PROCEDURE — 93295 DEV INTERROG REMOTE 1/2/MLT: CPT | Performed by: STUDENT IN AN ORGANIZED HEALTH CARE EDUCATION/TRAINING PROGRAM

## 2024-04-01 PROCEDURE — 93296 REM INTERROG EVL PM/IDS: CPT | Performed by: STUDENT IN AN ORGANIZED HEALTH CARE EDUCATION/TRAINING PROGRAM

## 2024-04-11 ENCOUNTER — OFFICE VISIT (OUTPATIENT)
Dept: PHYSICAL MEDICINE AND REHAB | Age: 64
End: 2024-04-11
Payer: MEDICARE

## 2024-04-11 VITALS
BODY MASS INDEX: 29.63 KG/M2 | WEIGHT: 207 LBS | HEART RATE: 62 BPM | SYSTOLIC BLOOD PRESSURE: 152 MMHG | OXYGEN SATURATION: 94 % | DIASTOLIC BLOOD PRESSURE: 78 MMHG | HEIGHT: 70 IN

## 2024-04-11 DIAGNOSIS — G89.29 CHRONIC LEFT SHOULDER PAIN: Primary | ICD-10-CM

## 2024-04-11 DIAGNOSIS — Z86.73 HISTORY OF STROKE: ICD-10-CM

## 2024-04-11 DIAGNOSIS — M25.512 CHRONIC LEFT SHOULDER PAIN: Primary | ICD-10-CM

## 2024-04-11 DIAGNOSIS — S43.002D ACQUIRED SUBLUXATION OF LEFT SHOULDER, SUBSEQUENT ENCOUNTER: ICD-10-CM

## 2024-04-11 PROCEDURE — 3077F SYST BP >= 140 MM HG: CPT | Performed by: STUDENT IN AN ORGANIZED HEALTH CARE EDUCATION/TRAINING PROGRAM

## 2024-04-11 PROCEDURE — 3017F COLORECTAL CA SCREEN DOC REV: CPT | Performed by: STUDENT IN AN ORGANIZED HEALTH CARE EDUCATION/TRAINING PROGRAM

## 2024-04-11 PROCEDURE — G8417 CALC BMI ABV UP PARAM F/U: HCPCS | Performed by: STUDENT IN AN ORGANIZED HEALTH CARE EDUCATION/TRAINING PROGRAM

## 2024-04-11 PROCEDURE — 20610 DRAIN/INJ JOINT/BURSA W/O US: CPT | Performed by: STUDENT IN AN ORGANIZED HEALTH CARE EDUCATION/TRAINING PROGRAM

## 2024-04-11 PROCEDURE — 1123F ACP DISCUSS/DSCN MKR DOCD: CPT | Performed by: STUDENT IN AN ORGANIZED HEALTH CARE EDUCATION/TRAINING PROGRAM

## 2024-04-11 PROCEDURE — 3078F DIAST BP <80 MM HG: CPT | Performed by: STUDENT IN AN ORGANIZED HEALTH CARE EDUCATION/TRAINING PROGRAM

## 2024-04-11 PROCEDURE — 99213 OFFICE O/P EST LOW 20 MIN: CPT | Performed by: STUDENT IN AN ORGANIZED HEALTH CARE EDUCATION/TRAINING PROGRAM

## 2024-04-11 PROCEDURE — G8427 DOCREV CUR MEDS BY ELIG CLIN: HCPCS | Performed by: STUDENT IN AN ORGANIZED HEALTH CARE EDUCATION/TRAINING PROGRAM

## 2024-04-11 PROCEDURE — 1036F TOBACCO NON-USER: CPT | Performed by: STUDENT IN AN ORGANIZED HEALTH CARE EDUCATION/TRAINING PROGRAM

## 2024-04-11 RX ORDER — TRIAMCINOLONE ACETONIDE 40 MG/ML
40 INJECTION, SUSPENSION INTRA-ARTICULAR; INTRAMUSCULAR ONCE
Status: COMPLETED | OUTPATIENT
Start: 2024-04-11 | End: 2024-04-11

## 2024-04-11 RX ORDER — ORAL SEMAGLUTIDE 3 MG/1
TABLET ORAL
COMMUNITY
Start: 2024-04-04

## 2024-04-11 RX ORDER — LIDOCAINE HYDROCHLORIDE 10 MG/ML
2 INJECTION, SOLUTION EPIDURAL; INFILTRATION; INTRACAUDAL; PERINEURAL ONCE
Status: COMPLETED | OUTPATIENT
Start: 2024-04-11 | End: 2024-04-11

## 2024-04-11 RX ADMIN — LIDOCAINE HYDROCHLORIDE 2 ML: 10 INJECTION, SOLUTION EPIDURAL; INFILTRATION; INTRACAUDAL; PERINEURAL at 10:46

## 2024-04-11 RX ADMIN — TRIAMCINOLONE ACETONIDE 40 MG: 40 INJECTION, SUSPENSION INTRA-ARTICULAR; INTRAMUSCULAR at 10:47

## 2024-04-11 NOTE — PATIENT INSTRUCTIONS
The injection consists of numbing agent Lidocaine, in addition to a corticosteroid, Kenalog.    Post Injection Care:  Apply ice to the area that was injected for 20 minutes at a time, intermittently for the next 2 days.  Please allow at least 1-2 hours between icing until the skin returns to normal temperature and color.  Please refrain from strenuous activities for the next 3 days.  This will help alleviate any discomfort from the injection and help prevent a post injection \"flare-up\".    Possible Side Effects:  Very few people may experience increased discomfort for a few days after the injection. This is typically called a post injection \"flare-up.”  Other possible side effects include injection site tenderness, mild soreness, swelling, warmth or redness, infection (<1% chance associated with any procedure). If you are a diabetic, watch for increased sugar readings over the next week.    If you experience the following symptoms, please call our office:  -Fever over 100.4 degrees  -Significantly increased redness or warmth  -Excessive swelling  -Drainage

## 2024-04-16 NOTE — PROGRESS NOTES
Elissa Cunningham MD  [facility] PHYSICAL MEDICINE AND REHABILITAION  [address] [address]  Dept: [phone]  Loc: [phone]     4/16/2024    Chief Complaint   Patient presents with    Shoulder Pain     Follow up shoulder pain - going to PT with some relief.  C/O pain with lifting.  Current pain  level is 3/10       Last injection: None  Taking anticoagulants/antiplatelets: Yes, warfarin and ASA - last PT 2.0  Diabetic: last A1c 6.5  Febrile/active infection: No    Ambulatory Procedure Time Out  Correct Patient: Yes  Correct Procedure: Yes  Correct Site/Side: Yes  Correct Site(s) Marked: Yes  Informed Consent Signed: Yes  Allergies Verified: Yes  Staff Present & Credential:: corbin    Diagnosis:  1. Chronic left shoulder pain    2. Acquired subluxation of left shoulder, subsequent encounter    3. History of stroke        After explaining the indications, risks, benefits and alternatives of a left subacromial injection, the patient agreed to proceed.  A permit was signed and scanned into the chart.  The patient was placed in a seated position. The skin was prepared with Chloraprep at the lateral shoulder.  Using aseptic no touch technique, a  25 gauge, 1.5\" needle with 1 cc of Kenalog 40mg/cc and 2 cc 1% lidocaine was directed to the subacromial bursa using ultrasound guidance.  After negative aspiration, the medication was injected.   Adequate hemostasis was achieved and a bandage applied. The patient tolerated the procedure well and was educated in post injection care.  The patient was clinically monitored after the injection and left the office without incident. There was post-injection reduction in pain. Ultrasound images are uploaded separately in the electronic medical record.     Elissa Cunningham MD  Physical Medicine and Rehabilitation    Administrations This Visit       lidocaine PF 1 % injection 2 mL       Admin Date  04/11/2024 
Shoulder: No edema, erythema, ecchymosis, effusion, instability, mass or deformity. Limited ROM, painful abduction. No crepitus. No tenderness to palpation at the acromioclavicular joint. +subluxation present, + Jonathan-Philippe,  +Impingement.  No appreciable AC joint separation.    Neurologic: Awake, alert and oriented in three planes.  Speech is fluent.  Left facial weakness mild  Hearing is intact for conversation.  Pupils are equal and round.  Extraocular muscles are intact.  Hearing is intact for conversation. Sensation: Intact for light touch and pin prick in all upper and lower extremity dermatomes.    Strength: 5/5 in all myotomes in the upper and lower right extremities.  Left hemiparesis present  The patient was able to rise from a chair. There is no tremor.      Modified Juan Miguel Scale:   0: No increase in muscle tone  1: Slight increase in muscle tone, manifested by a catch and release or by minimal resistance at the end of the range of motion when the affected part(s) is moved in flexion or extension 1+: Slight increase in muscle tone, manifested by a catch, followed by minimal resistance throughout the remainder (less than half) of the ROM  2: More marked increase in muscle tone through most of the ROM, but affected part(s) easily moved   3: Considerable increase in muscle tone, passive movement difficult   4: Affected part(s) rigid in flexion or extension     left Upper extremity:   Shoulder adduction MAS 0-1  Elbow flexion  MAS 1  Forearm pronation MAS 1  Wrist flexion MAS 1  Wrist extension MAS 0     left Lower extremity:   Hip flexors MAS 0  Hip Adductors MAS 0   Knee flexors MAS 1  Knee extensors MAS 0  Ankle plantar flexors MAS 1   Ankle inverters MAS 1      left Upper Limb Resting Posture:   [X] Pronated Flexed Elbow  [X]  Flexed wrist    Impression/Plan:  1. Chronic left shoulder pain    2. Acquired subluxation of left shoulder, subsequent encounter    3. History of stroke      - Jaciel was seen

## 2024-04-22 ENCOUNTER — OFFICE VISIT (OUTPATIENT)
Dept: PRIMARY CARE CLINIC | Age: 64
End: 2024-04-22
Payer: MEDICARE

## 2024-04-22 VITALS
BODY MASS INDEX: 29.49 KG/M2 | WEIGHT: 206 LBS | OXYGEN SATURATION: 97 % | HEIGHT: 70 IN | DIASTOLIC BLOOD PRESSURE: 86 MMHG | RESPIRATION RATE: 16 BRPM | HEART RATE: 63 BPM | TEMPERATURE: 97.3 F | SYSTOLIC BLOOD PRESSURE: 120 MMHG

## 2024-04-22 DIAGNOSIS — I10 ESSENTIAL HYPERTENSION: ICD-10-CM

## 2024-04-22 DIAGNOSIS — E11.9 TYPE 2 DIABETES MELLITUS WITHOUT COMPLICATION, WITHOUT LONG-TERM CURRENT USE OF INSULIN (HCC): Primary | ICD-10-CM

## 2024-04-22 DIAGNOSIS — I63.20 CEREBROVASCULAR ACCIDENT (CVA) DUE TO OCCLUSION OF PRECEREBRAL ARTERY (HCC): ICD-10-CM

## 2024-04-22 LAB — HBA1C MFR BLD: 6.7 %

## 2024-04-22 PROCEDURE — 99213 OFFICE O/P EST LOW 20 MIN: CPT | Performed by: FAMILY MEDICINE

## 2024-04-22 PROCEDURE — 1036F TOBACCO NON-USER: CPT | Performed by: FAMILY MEDICINE

## 2024-04-22 PROCEDURE — 1123F ACP DISCUSS/DSCN MKR DOCD: CPT | Performed by: FAMILY MEDICINE

## 2024-04-22 PROCEDURE — 83036 HEMOGLOBIN GLYCOSYLATED A1C: CPT | Performed by: FAMILY MEDICINE

## 2024-04-22 PROCEDURE — G8417 CALC BMI ABV UP PARAM F/U: HCPCS | Performed by: FAMILY MEDICINE

## 2024-04-22 PROCEDURE — 3044F HG A1C LEVEL LT 7.0%: CPT | Performed by: FAMILY MEDICINE

## 2024-04-22 PROCEDURE — G8427 DOCREV CUR MEDS BY ELIG CLIN: HCPCS | Performed by: FAMILY MEDICINE

## 2024-04-22 PROCEDURE — 3017F COLORECTAL CA SCREEN DOC REV: CPT | Performed by: FAMILY MEDICINE

## 2024-04-22 PROCEDURE — 3074F SYST BP LT 130 MM HG: CPT | Performed by: FAMILY MEDICINE

## 2024-04-22 PROCEDURE — 3079F DIAST BP 80-89 MM HG: CPT | Performed by: FAMILY MEDICINE

## 2024-04-22 PROCEDURE — 2022F DILAT RTA XM EVC RTNOPTHY: CPT | Performed by: FAMILY MEDICINE

## 2024-04-22 RX ORDER — ASPIRIN 81 MG/1
81 TABLET ORAL DAILY
COMMUNITY

## 2024-04-22 ASSESSMENT — ENCOUNTER SYMPTOMS
VISUAL CHANGE: 0
BLURRED VISION: 0

## 2024-04-22 NOTE — PROGRESS NOTES
Jaciel Ureña, a male of 64 y.o. came to the office 4/22/2024.     Patient Active Problem List   Diagnosis    PAC (premature atrial contraction)    Sarcoidosis    Syncope    HTN (hypertension)    Dual implantable cardioverter-defibrillator in situ    Hyperlipemia    Degenerative disc disease, cervical    Ureteral calculus, right    Gastroesophageal reflux disease with esophagitis    Type 2 diabetes mellitus without complication, without long-term current use of insulin (HCC)    Atrial tachycardia (HCC)          Cerebrovascular Accident  This is a chronic problem. The current episode started more than 1 month ago. The problem has been gradually improving. Pertinent negatives include no chest pain or visual change (htn). Nothing aggravates the symptoms. Treatments tried: pt. The treatment provided moderate (left arm and leg still feel heavy. recetnly got injection into left shoulder.) relief.   Diabetes  He presents for his follow-up diabetic visit. He has type 2 diabetes mellitus. His disease course has been stable. Pertinent negatives for diabetes include no blurred vision, no chest pain, no foot paresthesias, no foot ulcerations, no polydipsia, no polyuria, no visual change (htn) and no weight loss. Symptoms are stable. Current diabetic treatment includes oral agent (monotherapy). He is compliant with treatment all of the time. He is following a generally healthy diet.        Allergies   Allergen Reactions    Alcohol Other (See Comments)     Rum causes stomach pain and cramping.          Current Outpatient Medications on File Prior to Visit   Medication Sig Dispense Refill    aspirin 81 MG EC tablet Take 1 tablet by mouth daily      RYBELSUS 3 MG TABS       pantoprazole (PROTONIX) 20 MG tablet Take 1 tablet by mouth daily 90 tablet 1    warfarin (COUMADIN) 5 MG tablet Take 1 tablet by mouth daily 5 every other day 7.5 on Friday      Biotin 89708 MCG TABS Take by mouth Daily      vitamin D (CHOLECALCIFEROL) 25 MCG

## 2024-05-12 ENCOUNTER — PATIENT MESSAGE (OUTPATIENT)
Dept: PRIMARY CARE CLINIC | Age: 64
End: 2024-05-12

## 2024-05-12 DIAGNOSIS — G89.29 CHRONIC LOW BACK PAIN, UNSPECIFIED BACK PAIN LATERALITY, UNSPECIFIED WHETHER SCIATICA PRESENT: ICD-10-CM

## 2024-05-12 DIAGNOSIS — I10 ESSENTIAL HYPERTENSION: ICD-10-CM

## 2024-05-12 DIAGNOSIS — M54.2 NECK PAIN: ICD-10-CM

## 2024-05-12 DIAGNOSIS — G89.29 CHRONIC SHOULDER PAIN, UNSPECIFIED LATERALITY: Primary | ICD-10-CM

## 2024-05-12 DIAGNOSIS — M54.9 UPPER BACK PAIN: ICD-10-CM

## 2024-05-12 DIAGNOSIS — M54.50 CHRONIC LOW BACK PAIN, UNSPECIFIED BACK PAIN LATERALITY, UNSPECIFIED WHETHER SCIATICA PRESENT: ICD-10-CM

## 2024-05-12 DIAGNOSIS — M25.519 CHRONIC SHOULDER PAIN, UNSPECIFIED LATERALITY: Primary | ICD-10-CM

## 2024-05-13 RX ORDER — PANTOPRAZOLE SODIUM 40 MG/1
40 TABLET, DELAYED RELEASE ORAL DAILY
Qty: 30 TABLET | Refills: 0 | Status: SHIPPED | OUTPATIENT
Start: 2024-05-13

## 2024-05-13 RX ORDER — LISINOPRIL 40 MG/1
40 TABLET ORAL DAILY
Qty: 30 TABLET | Refills: 0 | Status: SHIPPED | OUTPATIENT
Start: 2024-05-13

## 2024-05-23 ENCOUNTER — OFFICE VISIT (OUTPATIENT)
Dept: PHYSICAL MEDICINE AND REHAB | Age: 64
End: 2024-05-23
Payer: MEDICARE

## 2024-05-23 VITALS
SYSTOLIC BLOOD PRESSURE: 110 MMHG | HEART RATE: 67 BPM | HEIGHT: 70 IN | WEIGHT: 205 LBS | OXYGEN SATURATION: 95 % | BODY MASS INDEX: 29.35 KG/M2 | DIASTOLIC BLOOD PRESSURE: 78 MMHG

## 2024-05-23 DIAGNOSIS — G81.14 LEFT SPASTIC HEMIPARESIS (HCC): ICD-10-CM

## 2024-05-23 DIAGNOSIS — R20.2 PARESTHESIAS IN LEFT HAND: ICD-10-CM

## 2024-05-23 DIAGNOSIS — G89.29 CHRONIC LEFT SHOULDER PAIN: Primary | ICD-10-CM

## 2024-05-23 DIAGNOSIS — Z86.73 HISTORY OF STROKE: ICD-10-CM

## 2024-05-23 DIAGNOSIS — M25.512 CHRONIC LEFT SHOULDER PAIN: Primary | ICD-10-CM

## 2024-05-23 DIAGNOSIS — S43.002D ACQUIRED SUBLUXATION OF LEFT SHOULDER, SUBSEQUENT ENCOUNTER: ICD-10-CM

## 2024-05-23 PROCEDURE — 3078F DIAST BP <80 MM HG: CPT | Performed by: STUDENT IN AN ORGANIZED HEALTH CARE EDUCATION/TRAINING PROGRAM

## 2024-05-23 PROCEDURE — 99213 OFFICE O/P EST LOW 20 MIN: CPT | Performed by: STUDENT IN AN ORGANIZED HEALTH CARE EDUCATION/TRAINING PROGRAM

## 2024-05-23 PROCEDURE — G8427 DOCREV CUR MEDS BY ELIG CLIN: HCPCS | Performed by: STUDENT IN AN ORGANIZED HEALTH CARE EDUCATION/TRAINING PROGRAM

## 2024-05-23 PROCEDURE — 1036F TOBACCO NON-USER: CPT | Performed by: STUDENT IN AN ORGANIZED HEALTH CARE EDUCATION/TRAINING PROGRAM

## 2024-05-23 PROCEDURE — G8417 CALC BMI ABV UP PARAM F/U: HCPCS | Performed by: STUDENT IN AN ORGANIZED HEALTH CARE EDUCATION/TRAINING PROGRAM

## 2024-05-23 PROCEDURE — 1123F ACP DISCUSS/DSCN MKR DOCD: CPT | Performed by: STUDENT IN AN ORGANIZED HEALTH CARE EDUCATION/TRAINING PROGRAM

## 2024-05-23 PROCEDURE — 3017F COLORECTAL CA SCREEN DOC REV: CPT | Performed by: STUDENT IN AN ORGANIZED HEALTH CARE EDUCATION/TRAINING PROGRAM

## 2024-05-23 PROCEDURE — 3074F SYST BP LT 130 MM HG: CPT | Performed by: STUDENT IN AN ORGANIZED HEALTH CARE EDUCATION/TRAINING PROGRAM

## 2024-05-23 RX ORDER — LIDOCAINE 50 MG/G
1 PATCH TOPICAL DAILY
Qty: 15 PATCH | Refills: 0 | Status: SHIPPED | OUTPATIENT
Start: 2024-05-23 | End: 2024-06-07

## 2024-05-23 RX ORDER — TIZANIDINE 2 MG/1
TABLET ORAL
Qty: 45 TABLET | Refills: 0 | Status: SHIPPED | OUTPATIENT
Start: 2024-05-23

## 2024-05-23 NOTE — PROGRESS NOTES
planning/management)    DME: new AFO, mild pain along footplate with ambulation  - instructed to return to [place] to discuss if adjustments can be made    Therapies:  - PT rx was updated to add more focus on spine from PCP, feels like this has been most beneficial for him; also helpful with mood; will continue as long as there are rehab goals to achieve - can add C-spine treatments as well to see if this helps shoulder at all    Spasticity:   Limited noted spasticity on exam today, will discuss with outpatient PT as well whether is appears to be interfering  - tizanidine PRN for muscle spasms overnight and as needed  - did not tolerate baclofen in acute care    Mood:   I have reached out to a few community resources to try and get Jaciel established with someone familiar with rehab psychology - awaiting responses.  - I do think it would be beneficial for him to follow with somebody given his frustration with overall recovery  - Will also reach out to PCP about possibly starting something for mood - given cardiac history, will defer to PCP at this time for choice of agent    Orders Placed This Encounter   Procedures    CT CERVICAL SPINE WO CONTRAST     Standing Status:   Future     Standing Expiration Date:   5/24/2025     Order Specific Question:   Reason for exam:     Answer:   neck pain       Orders Placed This Encounter   Medications    tiZANidine (ZANAFLEX) 2 MG tablet     Sig: Take 1-2 tablets nightly as needed.     Dispense:  45 tablet     Refill:  0    lidocaine (LIDODERM) 5 %     Sig: Place 1 patch onto the skin daily for 15 days 12 hours on, 12 hours off.     Dispense:  15 patch     Refill:  0     The patient was educated about the diagnosis, prognosis, indications, risks and benefits of treatment. An opportunity to ask questions was given to the patient and questions were answered.  The patient agreed to proceed with the recommended treatment as described above.     Return in about 6 weeks (around

## 2024-05-31 LAB — HBA1C MFR BLD HPLC: 6.2 %

## 2024-06-16 DIAGNOSIS — I10 ESSENTIAL HYPERTENSION: ICD-10-CM

## 2024-06-17 ENCOUNTER — HOSPITAL ENCOUNTER (OUTPATIENT)
Dept: CT IMAGING | Age: 64
Discharge: HOME | End: 2024-06-19
Attending: STUDENT IN AN ORGANIZED HEALTH CARE EDUCATION/TRAINING PROGRAM
Payer: MEDICARE

## 2024-06-17 DIAGNOSIS — R20.2 PARESTHESIAS IN LEFT HAND: ICD-10-CM

## 2024-06-17 DIAGNOSIS — M25.512 CHRONIC LEFT SHOULDER PAIN: ICD-10-CM

## 2024-06-17 DIAGNOSIS — G89.29 CHRONIC LEFT SHOULDER PAIN: ICD-10-CM

## 2024-06-17 PROCEDURE — 72125 CT NECK SPINE W/O DYE: CPT

## 2024-06-17 RX ORDER — LISINOPRIL 40 MG/1
40 TABLET ORAL DAILY
Qty: 30 TABLET | Refills: 5 | Status: SHIPPED | OUTPATIENT
Start: 2024-06-17

## 2024-06-17 RX ORDER — PANTOPRAZOLE SODIUM 40 MG/1
40 TABLET, DELAYED RELEASE ORAL DAILY
Qty: 30 TABLET | Refills: 5 | Status: SHIPPED | OUTPATIENT
Start: 2024-06-17

## 2024-06-18 RX ORDER — TIZANIDINE 2 MG/1
TABLET ORAL
Qty: 60 TABLET | Refills: 2 | Status: SHIPPED | OUTPATIENT
Start: 2024-06-18

## 2024-07-11 ENCOUNTER — OFFICE VISIT (OUTPATIENT)
Dept: PHYSICAL MEDICINE AND REHAB | Age: 64
End: 2024-07-11
Payer: MEDICARE

## 2024-07-11 VITALS
SYSTOLIC BLOOD PRESSURE: 124 MMHG | WEIGHT: 206 LBS | OXYGEN SATURATION: 95 % | DIASTOLIC BLOOD PRESSURE: 64 MMHG | BODY MASS INDEX: 29.49 KG/M2 | TEMPERATURE: 97.6 F | HEIGHT: 70 IN | HEART RATE: 64 BPM

## 2024-07-11 DIAGNOSIS — M79.18 MYOFASCIAL PAIN SYNDROME: ICD-10-CM

## 2024-07-11 DIAGNOSIS — Z86.73 HISTORY OF STROKE: ICD-10-CM

## 2024-07-11 DIAGNOSIS — M48.02 NEUROFORAMINAL STENOSIS OF CERVICAL SPINE: ICD-10-CM

## 2024-07-11 DIAGNOSIS — M54.2 CERVICALGIA: ICD-10-CM

## 2024-07-11 DIAGNOSIS — M75.42 IMPINGEMENT SYNDROME OF LEFT SHOULDER: ICD-10-CM

## 2024-07-11 DIAGNOSIS — M25.512 CHRONIC LEFT SHOULDER PAIN: Primary | ICD-10-CM

## 2024-07-11 DIAGNOSIS — G81.14 LEFT SPASTIC HEMIPARESIS (HCC): ICD-10-CM

## 2024-07-11 DIAGNOSIS — S43.002D ACQUIRED SUBLUXATION OF LEFT SHOULDER, SUBSEQUENT ENCOUNTER: ICD-10-CM

## 2024-07-11 DIAGNOSIS — G89.29 CHRONIC LEFT SHOULDER PAIN: Primary | ICD-10-CM

## 2024-07-11 PROCEDURE — 1036F TOBACCO NON-USER: CPT | Performed by: STUDENT IN AN ORGANIZED HEALTH CARE EDUCATION/TRAINING PROGRAM

## 2024-07-11 PROCEDURE — 3078F DIAST BP <80 MM HG: CPT | Performed by: STUDENT IN AN ORGANIZED HEALTH CARE EDUCATION/TRAINING PROGRAM

## 2024-07-11 PROCEDURE — 99213 OFFICE O/P EST LOW 20 MIN: CPT | Performed by: STUDENT IN AN ORGANIZED HEALTH CARE EDUCATION/TRAINING PROGRAM

## 2024-07-11 PROCEDURE — 3017F COLORECTAL CA SCREEN DOC REV: CPT | Performed by: STUDENT IN AN ORGANIZED HEALTH CARE EDUCATION/TRAINING PROGRAM

## 2024-07-11 PROCEDURE — 1123F ACP DISCUSS/DSCN MKR DOCD: CPT | Performed by: STUDENT IN AN ORGANIZED HEALTH CARE EDUCATION/TRAINING PROGRAM

## 2024-07-11 PROCEDURE — 3074F SYST BP LT 130 MM HG: CPT | Performed by: STUDENT IN AN ORGANIZED HEALTH CARE EDUCATION/TRAINING PROGRAM

## 2024-07-11 PROCEDURE — G8417 CALC BMI ABV UP PARAM F/U: HCPCS | Performed by: STUDENT IN AN ORGANIZED HEALTH CARE EDUCATION/TRAINING PROGRAM

## 2024-07-11 PROCEDURE — G8427 DOCREV CUR MEDS BY ELIG CLIN: HCPCS | Performed by: STUDENT IN AN ORGANIZED HEALTH CARE EDUCATION/TRAINING PROGRAM

## 2024-07-11 PROCEDURE — 20611 DRAIN/INJ JOINT/BURSA W/US: CPT | Performed by: STUDENT IN AN ORGANIZED HEALTH CARE EDUCATION/TRAINING PROGRAM

## 2024-07-11 RX ORDER — BUPIVACAINE HYDROCHLORIDE 2.5 MG/ML
2 INJECTION, SOLUTION INFILTRATION; PERINEURAL ONCE
Status: COMPLETED | OUTPATIENT
Start: 2024-07-11 | End: 2024-07-11

## 2024-07-11 RX ORDER — TRIAMCINOLONE ACETONIDE 40 MG/ML
40 INJECTION, SUSPENSION INTRA-ARTICULAR; INTRAMUSCULAR ONCE
Status: COMPLETED | OUTPATIENT
Start: 2024-07-11 | End: 2024-07-11

## 2024-07-11 RX ADMIN — BUPIVACAINE HYDROCHLORIDE 5 MG: 2.5 INJECTION, SOLUTION INFILTRATION; PERINEURAL at 14:01

## 2024-07-11 RX ADMIN — TRIAMCINOLONE ACETONIDE 40 MG: 40 INJECTION, SUSPENSION INTRA-ARTICULAR; INTRAMUSCULAR at 13:48

## 2024-07-11 NOTE — PATIENT INSTRUCTIONS
The injection consists of numbing agent Marcaine, in addition to a corticosteroid, Kenalog.    Post Injection Care:  Apply ice to the area that was injected for 20 minutes at a time, intermittently for the next 2 days.  Please allow at least 1-2 hours between icing until the skin returns to normal temperature and color.  Please refrain from strenuous activities for the next 3 days.  This will help alleviate any discomfort from the injection and help prevent a post injection \"flare-up\".    Possible Side Effects:  Very few people may experience increased discomfort for a few days after the injection. This is typically called a post injection \"flare-up.”  Other possible side effects include injection site tenderness, mild soreness, swelling, warmth or redness, infection (<1% chance associated with any procedure). If you are a diabetic, watch for increased sugar readings over the next week.    If you experience the following symptoms, please call our office:  -Fever over 100.4 degrees  -Significantly increased redness or warmth  -Excessive swelling  -Drainage

## 2024-07-11 NOTE — PROGRESS NOTES
Elissa Cunningham MD  Physical Medicine & Rehabilitation  [address] [address]  [phone]     7/23/24    Chief Complaint   Patient presents with    Shoulder Pain     Shoulder pain, pain scale is 4/10.  Pt states just left PT , shoulder is tight.         HPI:  Jaciel Ureña is a 64 y.o. year old man seen today in follow up regarding left shoulder pain and stroke.    Interval history: Today, the pain is rated Pain Score:   5 where 0 is no pain and 10 is pain as bad as it can be.     Pain: He got a left subacromial bursa injection April 11, 2024. States that the injection helped significantly, however this did not last more than a few weeks. Continues to endorse persistent heaviness in the left arm, left leg. Also endorses a history of cervical spine disease s/p cervical disc surgery. Taking Tylenol for the pain and states that maybe helps a little bit. No new weakness in the left arm. Tizanidine helps at night.    Therapies: Continues to go to [facility] [address]. Feels like therapy has been also good for mood as well. Wants to see if addressing low back issues would also be beneficial. Would like to add some neck therapy as well.    Spasticity: Taking tizanidine at night for spasms.  Did try Baclofen in acute rehab, but reports that he was too tired the next day and did not like the lasting effects of the medication.     DME: wearing new AFO on the left side.     Mood: Comes and goes with mood. Was provided with names of psychologists in community. Has not seen anybody with rehab psychology at this time. Interested in stroke survivor groups in the future as well.     Medical: He has been seeing his other physicians in the community and is being followed for anti-phospholipid condition and cardiac sarcoid. On coumadin and ASA.     Past Medical History:   Diagnosis Date    Antiphospholipid syndrome (HCC)     [facility]    Cardiac defibrillator in place     Cardiac sarcoidosis (HCC)     Cecal polyp 07/12/2021

## 2024-07-15 RX ORDER — PANTOPRAZOLE SODIUM 40 MG/1
40 TABLET, DELAYED RELEASE ORAL DAILY
Qty: 30 TABLET | Refills: 5 | Status: SHIPPED | OUTPATIENT
Start: 2024-07-15

## 2024-07-23 NOTE — PROGRESS NOTES
Elissa Cunningham MD  [facility] PHYSICIANS [facility] PHYSICAL MEDICINE AND REHABILITAION  [address]  [address]  [facility] [address]  Dept: [phone]  Loc: [phone]     7/23/2024    Chief Complaint   Patient presents with    Shoulder Pain     Shoulder pain, pain scale is 4/10.  Pt states just left PT , shoulder is tight.         Last injection: 04/11/24  Taking anticoagulants/antiplatelets: Yes, warfarin and ASA - last INR 2.2  Diabetic: last A1c 6.2  Febrile/active infection: No    Ambulatory Procedure Time Out  Correct Patient: Yes  Correct Procedure: Yes  Correct Site/Side: Yes  Correct Site(s) Marked: Yes  Informed Consent Signed: Yes  Allergies Verified: Yes  Staff Present & Credential:: corbin FIORE    Diagnosis:  1. Chronic left shoulder pain    2. Acquired subluxation of left shoulder, subsequent encounter    3. Left spastic hemiparesis (HCC)    4. Impingement syndrome of left shoulder    5. Neuroforaminal stenosis of cervical spine    6. Myofascial pain syndrome    7. Cervicalgia    8. History of stroke        After explaining the indications, risks, benefits and alternatives of a left subacromial injection, the patient agreed to proceed.  A permit was signed and scanned into the chart.  The patient was placed in a seated position. The skin was prepared with Chloraprep at the lateral shoulder.  Using aseptic no touch technique, a  25 gauge, 1.5\" needle with 1 cc of Kenalog 40mg/cc and 2 cc 0.25% marcaine was directed to the subacromial bursa using ultrasound guidance.  After negative aspiration, the medication was injected.   Adequate hemostasis was achieved and a bandage applied. The patient tolerated the procedure well and was educated in post injection care.  The patient was clinically monitored after the injection and left the office without incident. There was post-injection reduction in pain. Ultrasound images are uploaded separately in the electronic medical

## 2024-08-22 ENCOUNTER — OFFICE VISIT (OUTPATIENT)
Dept: PRIMARY CARE CLINIC | Age: 64
End: 2024-08-22
Payer: MEDICARE

## 2024-08-22 VITALS
HEIGHT: 70 IN | DIASTOLIC BLOOD PRESSURE: 82 MMHG | SYSTOLIC BLOOD PRESSURE: 144 MMHG | BODY MASS INDEX: 28.77 KG/M2 | WEIGHT: 201 LBS | HEART RATE: 58 BPM | OXYGEN SATURATION: 97 %

## 2024-08-22 DIAGNOSIS — E11.9 TYPE 2 DIABETES MELLITUS WITHOUT COMPLICATION, WITHOUT LONG-TERM CURRENT USE OF INSULIN (HCC): ICD-10-CM

## 2024-08-22 DIAGNOSIS — E78.5 HYPERLIPIDEMIA, UNSPECIFIED HYPERLIPIDEMIA TYPE: ICD-10-CM

## 2024-08-22 DIAGNOSIS — I63.20 CEREBROVASCULAR ACCIDENT (CVA) DUE TO OCCLUSION OF PRECEREBRAL ARTERY (HCC): Primary | ICD-10-CM

## 2024-08-22 DIAGNOSIS — I10 ESSENTIAL HYPERTENSION: ICD-10-CM

## 2024-08-22 PROBLEM — I47.19 ATRIAL TACHYCARDIA (HCC): Status: RESOLVED | Noted: 2024-01-19 | Resolved: 2024-08-22

## 2024-08-22 LAB
ALBUMIN: 4.6 G/DL (ref 3.5–5.2)
ALP BLD-CCNC: 99 U/L (ref 40–129)
ALT SERPL-CCNC: 16 U/L (ref 0–40)
ANION GAP SERPL CALCULATED.3IONS-SCNC: 14 MMOL/L (ref 7–16)
AST SERPL-CCNC: 19 U/L (ref 0–39)
BILIRUB SERPL-MCNC: 1.1 MG/DL (ref 0–1.2)
BUN BLDV-MCNC: 15 MG/DL (ref 6–23)
CALCIUM SERPL-MCNC: 9.5 MG/DL (ref 8.6–10.2)
CHLORIDE BLD-SCNC: 103 MMOL/L (ref 98–107)
CHOLESTEROL, TOTAL: 125 MG/DL
CO2: 22 MMOL/L (ref 22–29)
CREAT SERPL-MCNC: 0.9 MG/DL (ref 0.7–1.2)
GFR, ESTIMATED: >90 ML/MIN/1.73M2
GLUCOSE BLD-MCNC: 115 MG/DL (ref 74–99)
HBA1C MFR BLD: 6.4 % (ref 4–5.6)
HCT VFR BLD CALC: 42.2 % (ref 37–54)
HDLC SERPL-MCNC: 53 MG/DL
HEMOGLOBIN: 14.2 G/DL (ref 12.5–16.5)
LDL CHOLESTEROL: 47 MG/DL
MCH RBC QN AUTO: 30.5 PG (ref 26–35)
MCHC RBC AUTO-ENTMCNC: 33.6 G/DL (ref 32–34.5)
MCV RBC AUTO: 90.8 FL (ref 80–99.9)
PDW BLD-RTO: 13.4 % (ref 11.5–15)
PLATELET # BLD: 212 K/UL (ref 130–450)
PMV BLD AUTO: 10.2 FL (ref 7–12)
POTASSIUM SERPL-SCNC: 4.7 MMOL/L (ref 3.5–5)
RBC # BLD: 4.65 M/UL (ref 3.8–5.8)
SODIUM BLD-SCNC: 139 MMOL/L (ref 132–146)
TOTAL PROTEIN: 7 G/DL (ref 6.4–8.3)
TRIGL SERPL-MCNC: 127 MG/DL
VLDLC SERPL CALC-MCNC: 25 MG/DL
WBC # BLD: 9.5 K/UL (ref 4.5–11.5)

## 2024-08-22 PROCEDURE — G8427 DOCREV CUR MEDS BY ELIG CLIN: HCPCS | Performed by: FAMILY MEDICINE

## 2024-08-22 PROCEDURE — G8417 CALC BMI ABV UP PARAM F/U: HCPCS | Performed by: FAMILY MEDICINE

## 2024-08-22 PROCEDURE — 3017F COLORECTAL CA SCREEN DOC REV: CPT | Performed by: FAMILY MEDICINE

## 2024-08-22 PROCEDURE — 1123F ACP DISCUSS/DSCN MKR DOCD: CPT | Performed by: FAMILY MEDICINE

## 2024-08-22 PROCEDURE — 3077F SYST BP >= 140 MM HG: CPT | Performed by: FAMILY MEDICINE

## 2024-08-22 PROCEDURE — 99214 OFFICE O/P EST MOD 30 MIN: CPT | Performed by: FAMILY MEDICINE

## 2024-08-22 PROCEDURE — 1036F TOBACCO NON-USER: CPT | Performed by: FAMILY MEDICINE

## 2024-08-22 PROCEDURE — 3044F HG A1C LEVEL LT 7.0%: CPT | Performed by: FAMILY MEDICINE

## 2024-08-22 PROCEDURE — 3079F DIAST BP 80-89 MM HG: CPT | Performed by: FAMILY MEDICINE

## 2024-08-22 PROCEDURE — 2022F DILAT RTA XM EVC RTNOPTHY: CPT | Performed by: FAMILY MEDICINE

## 2024-08-22 PROCEDURE — 36415 COLL VENOUS BLD VENIPUNCTURE: CPT | Performed by: FAMILY MEDICINE

## 2024-08-22 RX ORDER — LIDOCAINE 50 MG/G
PATCH TOPICAL
COMMUNITY
Start: 2024-05-23

## 2024-08-22 ASSESSMENT — ENCOUNTER SYMPTOMS: SHORTNESS OF BREATH: 0

## 2024-08-22 NOTE — PROGRESS NOTES
L. Atrophy of left ue and le mm.   LLE: with afo   Lymphadenopathy:      Cervical: No cervical adenopathy.   Skin:     General: Skin is warm and dry.   Neurological:      Mental Status: He is alert and oriented to person, place, and time.   Psychiatric:         Mood and Affect: Mood normal.         ASSESSMENT AND PLAN:    Jaciel was seen today for stroke.    Diagnoses and all orders for this visit:    Cerebrovascular accident (CVA) due to occlusion of precerebral artery (HCC)    Essential hypertension  -     CBC  -     Comprehensive Metabolic Panel    Hyperlipidemia, unspecified hyperlipidemia type  -     Lipid Panel    Type 2 diabetes mellitus without complication, without long-term current use of insulin (HCC)  -     Hemoglobin A1C    - continue his restorative PT  - monitor bp;s and if > 140/90 then add Norvasc at 2.5 mg vs 5 mg.  - low chol/carb diet and statin.     Return in about 4 months (around 12/22/2024).    Liban Berger, DO

## 2024-09-10 ENCOUNTER — OFFICE VISIT (OUTPATIENT)
Dept: PHYSICAL MEDICINE AND REHAB | Age: 64
End: 2024-09-10

## 2024-09-10 VITALS
SYSTOLIC BLOOD PRESSURE: 135 MMHG | WEIGHT: 202.82 LBS | OXYGEN SATURATION: 97 % | BODY MASS INDEX: 29.04 KG/M2 | HEART RATE: 68 BPM | TEMPERATURE: 97.3 F | DIASTOLIC BLOOD PRESSURE: 81 MMHG | HEIGHT: 70 IN

## 2024-09-10 DIAGNOSIS — M25.512 CHRONIC LEFT SHOULDER PAIN: Primary | ICD-10-CM

## 2024-09-10 DIAGNOSIS — M48.02 NEUROFORAMINAL STENOSIS OF CERVICAL SPINE: ICD-10-CM

## 2024-09-10 DIAGNOSIS — M75.42 IMPINGEMENT SYNDROME OF LEFT SHOULDER: ICD-10-CM

## 2024-09-10 DIAGNOSIS — G89.29 CHRONIC LEFT SHOULDER PAIN: Primary | ICD-10-CM

## 2024-09-10 DIAGNOSIS — Z86.73 HISTORY OF STROKE: ICD-10-CM

## 2024-09-10 DIAGNOSIS — M79.18 MYOFASCIAL PAIN SYNDROME: ICD-10-CM

## 2024-09-10 DIAGNOSIS — S43.002D ACQUIRED SUBLUXATION OF LEFT SHOULDER, SUBSEQUENT ENCOUNTER: ICD-10-CM

## 2024-09-10 DIAGNOSIS — G81.14 LEFT SPASTIC HEMIPARESIS (HCC): ICD-10-CM

## 2024-09-10 DIAGNOSIS — R20.2 PARESTHESIAS IN LEFT HAND: ICD-10-CM

## 2024-10-08 ENCOUNTER — OFFICE VISIT (OUTPATIENT)
Dept: PHYSICAL MEDICINE AND REHAB | Age: 64
End: 2024-10-08
Payer: MEDICARE

## 2024-10-08 VITALS — BODY MASS INDEX: 28.92 KG/M2 | HEIGHT: 70 IN | WEIGHT: 202 LBS

## 2024-10-08 DIAGNOSIS — M25.512 CHRONIC LEFT SHOULDER PAIN: Primary | ICD-10-CM

## 2024-10-08 DIAGNOSIS — M79.18 MYOFASCIAL PAIN SYNDROME: ICD-10-CM

## 2024-10-08 DIAGNOSIS — S43.002D ACQUIRED SUBLUXATION OF LEFT SHOULDER, SUBSEQUENT ENCOUNTER: ICD-10-CM

## 2024-10-08 DIAGNOSIS — M75.42 IMPINGEMENT SYNDROME OF LEFT SHOULDER: ICD-10-CM

## 2024-10-08 DIAGNOSIS — Z86.73 HISTORY OF STROKE: ICD-10-CM

## 2024-10-08 DIAGNOSIS — G81.14 LEFT SPASTIC HEMIPARESIS (HCC): ICD-10-CM

## 2024-10-08 DIAGNOSIS — G89.29 CHRONIC LEFT SHOULDER PAIN: Primary | ICD-10-CM

## 2024-10-08 PROCEDURE — 1036F TOBACCO NON-USER: CPT | Performed by: STUDENT IN AN ORGANIZED HEALTH CARE EDUCATION/TRAINING PROGRAM

## 2024-10-08 PROCEDURE — 99212 OFFICE O/P EST SF 10 MIN: CPT | Performed by: STUDENT IN AN ORGANIZED HEALTH CARE EDUCATION/TRAINING PROGRAM

## 2024-10-08 PROCEDURE — G8484 FLU IMMUNIZE NO ADMIN: HCPCS | Performed by: STUDENT IN AN ORGANIZED HEALTH CARE EDUCATION/TRAINING PROGRAM

## 2024-10-08 PROCEDURE — G8417 CALC BMI ABV UP PARAM F/U: HCPCS | Performed by: STUDENT IN AN ORGANIZED HEALTH CARE EDUCATION/TRAINING PROGRAM

## 2024-10-08 PROCEDURE — 1123F ACP DISCUSS/DSCN MKR DOCD: CPT | Performed by: STUDENT IN AN ORGANIZED HEALTH CARE EDUCATION/TRAINING PROGRAM

## 2024-10-08 PROCEDURE — G8427 DOCREV CUR MEDS BY ELIG CLIN: HCPCS | Performed by: STUDENT IN AN ORGANIZED HEALTH CARE EDUCATION/TRAINING PROGRAM

## 2024-10-08 PROCEDURE — 3017F COLORECTAL CA SCREEN DOC REV: CPT | Performed by: STUDENT IN AN ORGANIZED HEALTH CARE EDUCATION/TRAINING PROGRAM

## 2024-10-08 NOTE — PROGRESS NOTES
Elissa Cunningham MD  Physical Medicine & Rehabilitation  [address] [address]  [phone]     10/15/24    Chief Complaint   Patient presents with    Procedure     Patient presents for left shoulder injection.        HPI:  Jaciel Ureña is a 64 y.o. year old man seen today in follow up regarding chronic left shoulder pain and for planned left SA bursa CSI.  Most recent INR was 3.7 and his other providers are making adjustments to his coumadin dosing. Here for shoulder injection. No reported bleeding at this time. Other prior history as below    Pain: He got a left subacromial bursa injection July 11, 2024. States that the injection helped significantly this time. Continues to endorse persistent heaviness in the left arm, left leg. Also endorses a history of cervical spine disease s/p cervical disc surgery. Taking Tylenol for the pain and states that maybe helps a little bit. No new weakness in the left arm. Tizanidine helps at night. Tried Robaxin per Neuro at [facility] and had side effects.      Therapies:  Started back in therapy.     Spasticity: Taking tizanidine at night for spasms.  Did try Baclofen in acute rehab, but reports that he was too tired the next day and did not like the lasting effects of the medication.   He was started on Robaxin by his Neurologist - had some worsening Neurologic tiredness and feeling like he was going to black out.  Has not tried Dantrium that I can see     DME: wearing new AFO on the left side. Tried Giv[employer] and did not like last effects of tightness once removed.     Mood: Comes and goes with mood. Was provided with names of psychologists in community. Started with [facility] Magana for counseling. Still not sure how much benefit he is getting at this time.     Medical: He has been seeing his other physicians in the community and is being followed for anti-phospholipid condition and cardiac sarcoid. On coumadin and ASA. Most recent INR was 3.7.      Past Medical History:

## 2024-10-09 ENCOUNTER — TELEPHONE (OUTPATIENT)
Dept: NON INVASIVE DIAGNOSTICS | Age: 64
End: 2024-10-09

## 2024-10-09 NOTE — TELEPHONE ENCOUNTER
I left a message for patient stating I had a request to have his ICD management transferred to [place]. I wanted to verify the request.    Natasha Bran RN, BSN  [facility] [place]   [facility]

## 2024-12-18 SDOH — ECONOMIC STABILITY: INCOME INSECURITY: HOW HARD IS IT FOR YOU TO PAY FOR THE VERY BASICS LIKE FOOD, HOUSING, MEDICAL CARE, AND HEATING?: NOT HARD AT ALL

## 2024-12-18 SDOH — ECONOMIC STABILITY: FOOD INSECURITY: WITHIN THE PAST 12 MONTHS, THE FOOD YOU BOUGHT JUST DIDN'T LAST AND YOU DIDN'T HAVE MONEY TO GET MORE.: NEVER TRUE

## 2024-12-18 SDOH — ECONOMIC STABILITY: TRANSPORTATION INSECURITY
IN THE PAST 12 MONTHS, HAS LACK OF TRANSPORTATION KEPT YOU FROM MEETINGS, WORK, OR FROM GETTING THINGS NEEDED FOR DAILY LIVING?: NO

## 2024-12-18 SDOH — ECONOMIC STABILITY: FOOD INSECURITY: WITHIN THE PAST 12 MONTHS, YOU WORRIED THAT YOUR FOOD WOULD RUN OUT BEFORE YOU GOT MONEY TO BUY MORE.: NEVER TRUE

## 2024-12-19 ENCOUNTER — PROCEDURE VISIT (OUTPATIENT)
Dept: PHYSICAL MEDICINE AND REHAB | Age: 64
End: 2024-12-19

## 2024-12-19 VITALS
DIASTOLIC BLOOD PRESSURE: 78 MMHG | HEIGHT: 70 IN | SYSTOLIC BLOOD PRESSURE: 135 MMHG | WEIGHT: 213.63 LBS | HEART RATE: 52 BPM | BODY MASS INDEX: 30.58 KG/M2

## 2024-12-19 DIAGNOSIS — G89.29 CHRONIC LEFT SHOULDER PAIN: ICD-10-CM

## 2024-12-19 DIAGNOSIS — I10 ESSENTIAL HYPERTENSION: ICD-10-CM

## 2024-12-19 DIAGNOSIS — M75.42 IMPINGEMENT SYNDROME OF LEFT SHOULDER: Primary | ICD-10-CM

## 2024-12-19 DIAGNOSIS — M25.512 CHRONIC LEFT SHOULDER PAIN: ICD-10-CM

## 2024-12-19 RX ORDER — BUPIVACAINE HYDROCHLORIDE 2.5 MG/ML
2 INJECTION, SOLUTION INFILTRATION; PERINEURAL ONCE
Status: COMPLETED | OUTPATIENT
Start: 2024-12-19 | End: 2024-12-19

## 2024-12-19 RX ORDER — TRIAMCINOLONE ACETONIDE 40 MG/ML
40 INJECTION, SUSPENSION INTRA-ARTICULAR; INTRAMUSCULAR ONCE
Status: COMPLETED | OUTPATIENT
Start: 2024-12-19 | End: 2024-12-19

## 2024-12-19 RX ADMIN — BUPIVACAINE HYDROCHLORIDE 5 MG: 2.5 INJECTION, SOLUTION INFILTRATION; PERINEURAL at 16:33

## 2024-12-19 RX ADMIN — TRIAMCINOLONE ACETONIDE 40 MG: 40 INJECTION, SUSPENSION INTRA-ARTICULAR; INTRAMUSCULAR at 16:32

## 2024-12-19 NOTE — PROGRESS NOTES
Route  SubCUTAneous Site  Other Documented By  Bethanie Garcia LPN    NDC: 8100-9079-47    Lot#: [id number]    : HOSPIRA    Patient Supplied?: No              triamcinolone acetonide (KENALOG-40) injection 40 mg       Admin Date  12/19/2024  16:32 Action  Given Dose  40 mg Route  Other Site   Documented By  Bethanie Garcia LPN    NDC: 5228-7576-07    Lot#: [id number]    : [facility] [place] (PRIMARY CARE)    Patient Supplied?: No

## 2024-12-20 ENCOUNTER — OFFICE VISIT (OUTPATIENT)
Dept: PRIMARY CARE CLINIC | Age: 64
End: 2024-12-20
Payer: MEDICARE

## 2024-12-20 VITALS
OXYGEN SATURATION: 95 % | DIASTOLIC BLOOD PRESSURE: 72 MMHG | HEIGHT: 70 IN | SYSTOLIC BLOOD PRESSURE: 138 MMHG | RESPIRATION RATE: 18 BRPM | WEIGHT: 212 LBS | TEMPERATURE: 97.1 F | HEART RATE: 69 BPM | BODY MASS INDEX: 30.35 KG/M2

## 2024-12-20 DIAGNOSIS — E11.9 TYPE 2 DIABETES MELLITUS WITHOUT COMPLICATION, WITHOUT LONG-TERM CURRENT USE OF INSULIN (HCC): Primary | ICD-10-CM

## 2024-12-20 DIAGNOSIS — I10 ESSENTIAL HYPERTENSION: ICD-10-CM

## 2024-12-20 LAB — HBA1C MFR BLD: 6.8 %

## 2024-12-20 PROCEDURE — 1036F TOBACCO NON-USER: CPT | Performed by: FAMILY MEDICINE

## 2024-12-20 PROCEDURE — G8484 FLU IMMUNIZE NO ADMIN: HCPCS | Performed by: FAMILY MEDICINE

## 2024-12-20 PROCEDURE — 3044F HG A1C LEVEL LT 7.0%: CPT | Performed by: FAMILY MEDICINE

## 2024-12-20 PROCEDURE — 1123F ACP DISCUSS/DSCN MKR DOCD: CPT | Performed by: FAMILY MEDICINE

## 2024-12-20 PROCEDURE — 1159F MED LIST DOCD IN RCRD: CPT | Performed by: FAMILY MEDICINE

## 2024-12-20 PROCEDURE — G8427 DOCREV CUR MEDS BY ELIG CLIN: HCPCS | Performed by: FAMILY MEDICINE

## 2024-12-20 PROCEDURE — 3075F SYST BP GE 130 - 139MM HG: CPT | Performed by: FAMILY MEDICINE

## 2024-12-20 PROCEDURE — 1160F RVW MEDS BY RX/DR IN RCRD: CPT | Performed by: FAMILY MEDICINE

## 2024-12-20 PROCEDURE — 83036 HEMOGLOBIN GLYCOSYLATED A1C: CPT | Performed by: FAMILY MEDICINE

## 2024-12-20 PROCEDURE — 3078F DIAST BP <80 MM HG: CPT | Performed by: FAMILY MEDICINE

## 2024-12-20 PROCEDURE — G8417 CALC BMI ABV UP PARAM F/U: HCPCS | Performed by: FAMILY MEDICINE

## 2024-12-20 PROCEDURE — 3017F COLORECTAL CA SCREEN DOC REV: CPT | Performed by: FAMILY MEDICINE

## 2024-12-20 PROCEDURE — 99213 OFFICE O/P EST LOW 20 MIN: CPT | Performed by: FAMILY MEDICINE

## 2024-12-20 PROCEDURE — 2022F DILAT RTA XM EVC RTNOPTHY: CPT | Performed by: FAMILY MEDICINE

## 2024-12-20 RX ORDER — ORAL SEMAGLUTIDE 7 MG/1
7 TABLET ORAL DAILY
Qty: 30 TABLET | Refills: 3 | Status: SHIPPED | OUTPATIENT
Start: 2024-12-20

## 2024-12-20 RX ORDER — LISINOPRIL 40 MG/1
40 TABLET ORAL DAILY
Qty: 30 TABLET | Refills: 5 | Status: SHIPPED | OUTPATIENT
Start: 2024-12-20

## 2024-12-20 RX ORDER — PANTOPRAZOLE SODIUM 40 MG/1
40 TABLET, DELAYED RELEASE ORAL DAILY
Qty: 30 TABLET | Refills: 5 | Status: SHIPPED | OUTPATIENT
Start: 2024-12-20

## 2024-12-20 RX ORDER — TIZANIDINE 2 MG/1
TABLET ORAL
Qty: 60 TABLET | Refills: 2 | Status: SHIPPED | OUTPATIENT
Start: 2024-12-20

## 2024-12-20 ASSESSMENT — ENCOUNTER SYMPTOMS
BLURRED VISION: 0
VISUAL CHANGE: 0
SHORTNESS OF BREATH: 0

## 2024-12-20 NOTE — PROGRESS NOTES
Jaciel Ureña, a male of 64 y.o. came to the office 12/20/2024.     Patient Active Problem List   Diagnosis    PAC (premature atrial contraction)    Sarcoidosis    Syncope    HTN (hypertension)    Dual implantable cardioverter-defibrillator in situ    Hyperlipemia    Degenerative disc disease, cervical    Ureteral calculus, right    Gastroesophageal reflux disease with esophagitis    Type 2 diabetes mellitus without complication, without long-term current use of insulin (HCC)          Diabetes  He presents for his follow-up diabetic visit. He has type 2 diabetes mellitus. His disease course has been stable. Pertinent negatives for hypoglycemia include no headaches. Associated symptoms include foot paresthesias. Pertinent negatives for diabetes include no blurred vision, no chest pain, no fatigue, no foot ulcerations, no polydipsia, no polyuria, no visual change and no weight loss. Symptoms are stable. Current diabetic treatment includes oral agent (monotherapy). He is compliant with treatment all of the time. He is following a generally healthy diet. He participates in exercise intermittently. An ACE inhibitor/angiotensin II receptor blocker is being taken.   Hypertension  This is a chronic problem. The problem is controlled. Pertinent negatives include no blurred vision, chest pain, headaches, palpitations, peripheral edema or shortness of breath. Past treatments include ACE inhibitors. The current treatment provides significant improvement. There are no compliance problems.     Neuro: done with PT for now.     Allergies   Allergen Reactions    Alcohol Other (See Comments)     Rum causes stomach pain and cramping.       Gabapentin Hallucinations    Baclofen      Fatigue    Robaxin [Methocarbamol] Other (See Comments)     lightheadedness       Current Outpatient Medications on File Prior to Visit   Medication Sig Dispense Refill    pantoprazole (PROTONIX) 40 MG tablet Take 1 tablet by mouth daily 30 tablet 5

## 2025-01-10 ENCOUNTER — TELEPHONE (OUTPATIENT)
Dept: NON INVASIVE DIAGNOSTICS | Age: 65
End: 2025-01-10

## 2025-01-17 DIAGNOSIS — I10 ESSENTIAL HYPERTENSION: ICD-10-CM

## 2025-01-18 DIAGNOSIS — I10 ESSENTIAL HYPERTENSION: ICD-10-CM

## 2025-01-18 RX ORDER — LISINOPRIL 40 MG/1
40 TABLET ORAL DAILY
Qty: 30 TABLET | Refills: 5 | Status: SHIPPED | OUTPATIENT
Start: 2025-01-18

## 2025-01-18 RX ORDER — PANTOPRAZOLE SODIUM 40 MG/1
40 TABLET, DELAYED RELEASE ORAL DAILY
Qty: 30 TABLET | Refills: 5 | Status: SHIPPED | OUTPATIENT
Start: 2025-01-18

## 2025-01-20 RX ORDER — LISINOPRIL 40 MG/1
40 TABLET ORAL DAILY
Qty: 30 TABLET | Refills: 5 | OUTPATIENT
Start: 2025-01-20

## 2025-01-20 RX ORDER — PANTOPRAZOLE SODIUM 40 MG/1
40 TABLET, DELAYED RELEASE ORAL DAILY
Qty: 30 TABLET | Refills: 5 | OUTPATIENT
Start: 2025-01-20

## 2025-02-14 DIAGNOSIS — I10 ESSENTIAL HYPERTENSION: ICD-10-CM

## 2025-02-14 RX ORDER — ROSUVASTATIN CALCIUM 5 MG/1
5 TABLET, COATED ORAL DAILY
Qty: 30 TABLET | Refills: 2 | Status: SHIPPED | OUTPATIENT
Start: 2025-02-14

## 2025-02-14 RX ORDER — LISINOPRIL 40 MG/1
40 TABLET ORAL DAILY
Qty: 30 TABLET | Refills: 2 | Status: SHIPPED | OUTPATIENT
Start: 2025-02-14

## 2025-02-14 RX ORDER — PANTOPRAZOLE SODIUM 40 MG/1
40 TABLET, DELAYED RELEASE ORAL DAILY
Qty: 30 TABLET | Refills: 2 | Status: SHIPPED | OUTPATIENT
Start: 2025-02-14

## 2025-02-17 ENCOUNTER — TELEPHONE (OUTPATIENT)
Dept: PRIMARY CARE CLINIC | Age: 65
End: 2025-02-17

## 2025-02-17 RX ORDER — ROSUVASTATIN CALCIUM 20 MG/1
20 TABLET, COATED ORAL DAILY
Qty: 30 TABLET | Refills: 5 | Status: SHIPPED | OUTPATIENT
Start: 2025-02-17

## 2025-02-17 NOTE — TELEPHONE ENCOUNTER
[facility] called and stated the patient said he usually gets 20 mg of Crestor    We sent in 5 mg. He refused it    Please call pharmacy with correct dosage    [phone]-Haley @ [facility], so she can call patient    Thank you

## 2025-03-11 RX ORDER — ROSUVASTATIN CALCIUM 20 MG/1
20 TABLET, COATED ORAL DAILY
Qty: 30 TABLET | Refills: 5 | Status: SHIPPED | OUTPATIENT
Start: 2025-03-11

## 2025-03-13 DIAGNOSIS — I10 ESSENTIAL HYPERTENSION: ICD-10-CM

## 2025-03-13 RX ORDER — LISINOPRIL 40 MG/1
40 TABLET ORAL DAILY
Qty: 90 TABLET | Refills: 1 | Status: SHIPPED | OUTPATIENT
Start: 2025-03-13

## 2025-03-20 ENCOUNTER — PROCEDURE VISIT (OUTPATIENT)
Dept: PHYSICAL MEDICINE AND REHAB | Age: 65
End: 2025-03-20
Payer: MEDICARE

## 2025-03-20 VITALS
WEIGHT: 210.1 LBS | SYSTOLIC BLOOD PRESSURE: 163 MMHG | DIASTOLIC BLOOD PRESSURE: 96 MMHG | HEIGHT: 70 IN | BODY MASS INDEX: 30.08 KG/M2 | HEART RATE: 55 BPM

## 2025-03-20 DIAGNOSIS — G89.29 CHRONIC LEFT SHOULDER PAIN: ICD-10-CM

## 2025-03-20 DIAGNOSIS — S43.002D ACQUIRED SUBLUXATION OF LEFT SHOULDER, SUBSEQUENT ENCOUNTER: ICD-10-CM

## 2025-03-20 DIAGNOSIS — M75.42 IMPINGEMENT SYNDROME OF LEFT SHOULDER: Primary | ICD-10-CM

## 2025-03-20 DIAGNOSIS — M25.512 CHRONIC LEFT SHOULDER PAIN: ICD-10-CM

## 2025-03-20 PROCEDURE — 3017F COLORECTAL CA SCREEN DOC REV: CPT | Performed by: STUDENT IN AN ORGANIZED HEALTH CARE EDUCATION/TRAINING PROGRAM

## 2025-03-20 PROCEDURE — 20611 DRAIN/INJ JOINT/BURSA W/US: CPT | Performed by: STUDENT IN AN ORGANIZED HEALTH CARE EDUCATION/TRAINING PROGRAM

## 2025-03-20 PROCEDURE — G8417 CALC BMI ABV UP PARAM F/U: HCPCS | Performed by: STUDENT IN AN ORGANIZED HEALTH CARE EDUCATION/TRAINING PROGRAM

## 2025-03-20 PROCEDURE — 1123F ACP DISCUSS/DSCN MKR DOCD: CPT | Performed by: STUDENT IN AN ORGANIZED HEALTH CARE EDUCATION/TRAINING PROGRAM

## 2025-03-20 PROCEDURE — 1159F MED LIST DOCD IN RCRD: CPT | Performed by: STUDENT IN AN ORGANIZED HEALTH CARE EDUCATION/TRAINING PROGRAM

## 2025-03-20 PROCEDURE — G8427 DOCREV CUR MEDS BY ELIG CLIN: HCPCS | Performed by: STUDENT IN AN ORGANIZED HEALTH CARE EDUCATION/TRAINING PROGRAM

## 2025-03-20 PROCEDURE — 3077F SYST BP >= 140 MM HG: CPT | Performed by: STUDENT IN AN ORGANIZED HEALTH CARE EDUCATION/TRAINING PROGRAM

## 2025-03-20 PROCEDURE — 3080F DIAST BP >= 90 MM HG: CPT | Performed by: STUDENT IN AN ORGANIZED HEALTH CARE EDUCATION/TRAINING PROGRAM

## 2025-03-20 PROCEDURE — 99213 OFFICE O/P EST LOW 20 MIN: CPT | Performed by: STUDENT IN AN ORGANIZED HEALTH CARE EDUCATION/TRAINING PROGRAM

## 2025-03-20 PROCEDURE — 1036F TOBACCO NON-USER: CPT | Performed by: STUDENT IN AN ORGANIZED HEALTH CARE EDUCATION/TRAINING PROGRAM

## 2025-03-20 PROCEDURE — 1160F RVW MEDS BY RX/DR IN RCRD: CPT | Performed by: STUDENT IN AN ORGANIZED HEALTH CARE EDUCATION/TRAINING PROGRAM

## 2025-03-20 RX ORDER — BUPIVACAINE HYDROCHLORIDE 2.5 MG/ML
2 INJECTION, SOLUTION INFILTRATION; PERINEURAL ONCE
Status: DISCONTINUED | OUTPATIENT
Start: 2025-03-20 | End: 2025-03-20

## 2025-03-20 RX ORDER — TRIAMCINOLONE ACETONIDE 40 MG/ML
40 INJECTION, SUSPENSION INTRA-ARTICULAR; INTRAMUSCULAR ONCE
Status: COMPLETED | OUTPATIENT
Start: 2025-03-20 | End: 2025-03-20

## 2025-03-20 RX ADMIN — TRIAMCINOLONE ACETONIDE 40 MG: 40 INJECTION, SUSPENSION INTRA-ARTICULAR; INTRAMUSCULAR at 16:43

## 2025-03-20 RX ADMIN — Medication 2 ML: at 16:42

## 2025-04-03 NOTE — PROGRESS NOTES
Elissa Cunningham MD  [facility]ICAL MEDICINE AND [facility]ILITAION  [address] [address]  Dept: [phone]  Loc: [phone]     4/3/2025    Chief Complaint   Patient presents with    Follow-up     Patient presents for follow up of left shoulder pain, reports that \"it still weighs a ton\" it will loosen up with he does PT but it always hurts.        Last injection: 12/19/2024  Taking anticoagulants/antiplatelets: Yes, warfarin and ASA - last INR 2.8  Diabetic: last A1c 6.4  Febrile/active infection: No    Timeout performed prior to procedure    Diagnosis:  1. Impingement syndrome of left shoulder    2. Chronic left shoulder pain    3. Acquired subluxation of left shoulder, subsequent encounter      After explaining the indications, risks, benefits and alternatives of a left subacromial injection, the patient agreed to proceed.  A permit was signed and scanned into the chart.  The patient was placed in a seated position. The skin was prepared with Chloraprep at the lateral shoulder.  Using aseptic no touch technique, a  25 gauge, 1.5\" needle with 1 cc of Kenalog 40mg/cc and 2 cc 1% lidocaine was directed to the subacromial bursa using ultrasound guidance.  After negative aspiration, the medication was injected.   Adequate hemostasis was achieved and a bandage applied. The patient tolerated the procedure well and was educated in post injection care.  The patient was clinically monitored after the injection and left the office without incident. There was post-injection reduction in pain. Ultrasound images are uploaded separately in the electronic medical record.     Elissa Cunningham MD  Board Certified Physical Medicine and Rehabilitation    Administrations This Visit       lidocaine 1 % injection 2 mL       Admin Date  03/20/2025  16:42 Action  Given Dose  2 mL Route  Other Site   Documented By  Bethanie Garcia LPN    NDC: 2268-1132-25    
      The patient was educated about the diagnosis, prognosis, indications, risks and benefits of treatment. An opportunity to ask questions was given to the patient and questions were answered.  The patient agreed to proceed with the recommended treatment as described above.     Follow up: 3 months     Elissa Cunningham MD  Board Certified Physical Medicine and Rehabilitation    Note: This report was completed using computerize voiced recognition software.  Every effort has been made to ensure accuracy; however, inadvertent computerized transcription errors may be present.

## 2025-04-30 ENCOUNTER — OFFICE VISIT (OUTPATIENT)
Dept: PRIMARY CARE CLINIC | Age: 65
End: 2025-04-30
Payer: MEDICARE

## 2025-04-30 VITALS
WEIGHT: 202 LBS | TEMPERATURE: 97.8 F | HEIGHT: 70 IN | SYSTOLIC BLOOD PRESSURE: 126 MMHG | BODY MASS INDEX: 28.92 KG/M2 | DIASTOLIC BLOOD PRESSURE: 78 MMHG | OXYGEN SATURATION: 95 % | HEART RATE: 51 BPM

## 2025-04-30 DIAGNOSIS — E11.9 TYPE 2 DIABETES MELLITUS WITHOUT COMPLICATION, WITHOUT LONG-TERM CURRENT USE OF INSULIN (HCC): ICD-10-CM

## 2025-04-30 DIAGNOSIS — G81.14 LEFT SPASTIC HEMIPARESIS (HCC): ICD-10-CM

## 2025-04-30 DIAGNOSIS — Z00.00 MEDICARE ANNUAL WELLNESS VISIT, SUBSEQUENT: Primary | ICD-10-CM

## 2025-04-30 DIAGNOSIS — Z12.5 SCREENING PSA (PROSTATE SPECIFIC ANTIGEN): ICD-10-CM

## 2025-04-30 LAB
ALBUMIN: 4.5 G/DL (ref 3.5–5.2)
ALP BLD-CCNC: 92 U/L (ref 40–129)
ALT SERPL-CCNC: 19 U/L (ref 0–50)
ANION GAP SERPL CALCULATED.3IONS-SCNC: 12 MMOL/L (ref 7–16)
AST SERPL-CCNC: 28 U/L (ref 0–50)
BILIRUB SERPL-MCNC: 1.2 MG/DL (ref 0–1.2)
BUN BLDV-MCNC: 15 MG/DL (ref 8–23)
CALCIUM SERPL-MCNC: 10 MG/DL (ref 8.8–10.2)
CHLORIDE BLD-SCNC: 103 MMOL/L (ref 98–107)
CHOLESTEROL, TOTAL: 131 MG/DL
CO2: 26 MMOL/L (ref 22–29)
CREAT SERPL-MCNC: 0.9 MG/DL (ref 0.7–1.2)
CREATININE URINE: 132 MG/DL (ref 40–278)
GFR, ESTIMATED: >90 ML/MIN/1.73M2
GLUCOSE BLD-MCNC: 156 MG/DL (ref 74–99)
HBA1C MFR BLD: 6.9 % (ref 4–5.6)
HDLC SERPL-MCNC: 57 MG/DL
LDL CHOLESTEROL: 57 MG/DL
MICROALBUMIN/CREAT 24H UR: <12 MG/L (ref 0–20)
MICROALBUMIN/CREAT UR-RTO: <9 MCG/MG CREAT (ref 0–30)
POTASSIUM SERPL-SCNC: 5.2 MMOL/L (ref 3.5–5.1)
PROSTATE SPECIFIC ANTIGEN: 0.56 NG/ML (ref 0–4)
SODIUM BLD-SCNC: 141 MMOL/L (ref 136–145)
TOTAL PROTEIN: 7.3 G/DL (ref 6.4–8.3)
TRIGL SERPL-MCNC: 86 MG/DL
VLDLC SERPL CALC-MCNC: 17 MG/DL

## 2025-04-30 PROCEDURE — 1123F ACP DISCUSS/DSCN MKR DOCD: CPT | Performed by: FAMILY MEDICINE

## 2025-04-30 PROCEDURE — 3017F COLORECTAL CA SCREEN DOC REV: CPT | Performed by: FAMILY MEDICINE

## 2025-04-30 PROCEDURE — G0439 PPPS, SUBSEQ VISIT: HCPCS | Performed by: FAMILY MEDICINE

## 2025-04-30 PROCEDURE — 1159F MED LIST DOCD IN RCRD: CPT | Performed by: FAMILY MEDICINE

## 2025-04-30 PROCEDURE — 1160F RVW MEDS BY RX/DR IN RCRD: CPT | Performed by: FAMILY MEDICINE

## 2025-04-30 PROCEDURE — 3074F SYST BP LT 130 MM HG: CPT | Performed by: FAMILY MEDICINE

## 2025-04-30 PROCEDURE — 3078F DIAST BP <80 MM HG: CPT | Performed by: FAMILY MEDICINE

## 2025-04-30 PROCEDURE — 36415 COLL VENOUS BLD VENIPUNCTURE: CPT | Performed by: FAMILY MEDICINE

## 2025-04-30 PROCEDURE — 3046F HEMOGLOBIN A1C LEVEL >9.0%: CPT | Performed by: FAMILY MEDICINE

## 2025-04-30 PROCEDURE — 99212 OFFICE O/P EST SF 10 MIN: CPT | Performed by: FAMILY MEDICINE

## 2025-04-30 RX ORDER — PANTOPRAZOLE SODIUM 40 MG/1
40 TABLET, DELAYED RELEASE ORAL DAILY
Qty: 90 TABLET | Refills: 1 | Status: SHIPPED | OUTPATIENT
Start: 2025-04-30

## 2025-04-30 RX ORDER — UBIDECARENONE 75 MG
50 CAPSULE ORAL DAILY
COMMUNITY

## 2025-04-30 SDOH — ECONOMIC STABILITY: FOOD INSECURITY: WITHIN THE PAST 12 MONTHS, THE FOOD YOU BOUGHT JUST DIDN'T LAST AND YOU DIDN'T HAVE MONEY TO GET MORE.: NEVER TRUE

## 2025-04-30 SDOH — ECONOMIC STABILITY: FOOD INSECURITY: WITHIN THE PAST 12 MONTHS, YOU WORRIED THAT YOUR FOOD WOULD RUN OUT BEFORE YOU GOT MONEY TO BUY MORE.: NEVER TRUE

## 2025-04-30 ASSESSMENT — ENCOUNTER SYMPTOMS
BLURRED VISION: 0
VISUAL CHANGE: 0

## 2025-04-30 ASSESSMENT — PATIENT HEALTH QUESTIONNAIRE - PHQ9
1. LITTLE INTEREST OR PLEASURE IN DOING THINGS: NOT AT ALL
SUM OF ALL RESPONSES TO PHQ QUESTIONS 1-9: 0
SUM OF ALL RESPONSES TO PHQ QUESTIONS 1-9: 0
2. FEELING DOWN, DEPRESSED OR HOPELESS: NOT AT ALL
SUM OF ALL RESPONSES TO PHQ QUESTIONS 1-9: 0
SUM OF ALL RESPONSES TO PHQ QUESTIONS 1-9: 0

## 2025-04-30 ASSESSMENT — LIFESTYLE VARIABLES
HOW OFTEN DO YOU HAVE A DRINK CONTAINING ALCOHOL: 2-3 TIMES A WEEK
HOW MANY STANDARD DRINKS CONTAINING ALCOHOL DO YOU HAVE ON A TYPICAL DAY: 1 OR 2

## 2025-04-30 NOTE — PROGRESS NOTES
Medicare Annual Wellness Visit    Jaciel Ureña is here for Medicare AWV    Assessment & Plan   Medicare annual wellness visit, subsequent  Left spastic hemiparesis (HCC)  Type 2 diabetes mellitus without complication, without long-term current use of insulin (HCC)  -     Comprehensive Metabolic Panel  -     Lipid Panel  -     Hemoglobin A1C  -     Albumin/Creatinine Ratio, Urine; Future  Screening PSA (prostate specific antigen)  -     PSA Screening       Return in 4 months (on 8/30/2025) for Medicare Annual Wellness Visit in 1 year.     Subjective   The following acute and/or chronic problems were also addressed today:    Jaciel Ureña, a male of 65 y.o. came to the office 4/30/2025.     Patient Active Problem List   Diagnosis    PAC (premature atrial contraction)    Sarcoidosis    Syncope    HTN (hypertension)    Dual implantable cardioverter-defibrillator in situ    Hyperlipemia    Degenerative disc disease, cervical    Ureteral calculus, right    Gastroesophageal reflux disease with esophagitis    Type 2 diabetes mellitus without complication, without long-term current use of insulin (HCC)    Left spastic hemiparesis (HCC)          Diabetes  He presents for his follow-up diabetic visit. He has type 2 diabetes mellitus. His disease course has been stable. Associated symptoms include foot paresthesias (left since stroke). Pertinent negatives for diabetes include no blurred vision, no chest pain, no fatigue, no polydipsia, no polyuria, no visual change and no weight loss. Symptoms are stable. Current diabetic treatment includes diet (stopped Rybelsus due to se). He is following a generally healthy diet. He participates in exercise every other day. An ACE inhibitor/angiotensin II receptor blocker is being taken.    Spastica hemiplegia: doing exercise qod with PT. Is seeing imporvement.    Allergies   Allergen Reactions    Alcohol Other (See Comments)     Rum causes stomach pain and cramping.       Gabapentin

## 2025-05-01 ENCOUNTER — RESULTS FOLLOW-UP (OUTPATIENT)
Dept: PRIMARY CARE CLINIC | Age: 65
End: 2025-05-01

## 2025-05-05 RX ORDER — PANTOPRAZOLE SODIUM 40 MG/1
40 TABLET, DELAYED RELEASE ORAL DAILY
Qty: 90 TABLET | Refills: 1 | OUTPATIENT
Start: 2025-05-05

## 2025-07-25 ENCOUNTER — OFFICE VISIT (OUTPATIENT)
Dept: PHYSICAL MEDICINE AND REHAB | Age: 65
End: 2025-07-25
Payer: MEDICARE

## 2025-07-25 VITALS — BODY MASS INDEX: 28.92 KG/M2 | HEIGHT: 70 IN | WEIGHT: 202 LBS

## 2025-07-25 DIAGNOSIS — Z86.73 HISTORY OF STROKE: ICD-10-CM

## 2025-07-25 DIAGNOSIS — M21.372 ACQUIRED LEFT FOOT DROP: ICD-10-CM

## 2025-07-25 DIAGNOSIS — G89.29 CHRONIC LEFT SHOULDER PAIN: ICD-10-CM

## 2025-07-25 DIAGNOSIS — G81.14 LEFT SPASTIC HEMIPARESIS (HCC): Primary | ICD-10-CM

## 2025-07-25 DIAGNOSIS — M25.512 CHRONIC LEFT SHOULDER PAIN: ICD-10-CM

## 2025-07-25 PROCEDURE — 1160F RVW MEDS BY RX/DR IN RCRD: CPT | Performed by: STUDENT IN AN ORGANIZED HEALTH CARE EDUCATION/TRAINING PROGRAM

## 2025-07-25 PROCEDURE — 1123F ACP DISCUSS/DSCN MKR DOCD: CPT | Performed by: STUDENT IN AN ORGANIZED HEALTH CARE EDUCATION/TRAINING PROGRAM

## 2025-07-25 PROCEDURE — G8417 CALC BMI ABV UP PARAM F/U: HCPCS | Performed by: STUDENT IN AN ORGANIZED HEALTH CARE EDUCATION/TRAINING PROGRAM

## 2025-07-25 PROCEDURE — 1159F MED LIST DOCD IN RCRD: CPT | Performed by: STUDENT IN AN ORGANIZED HEALTH CARE EDUCATION/TRAINING PROGRAM

## 2025-07-25 PROCEDURE — G8427 DOCREV CUR MEDS BY ELIG CLIN: HCPCS | Performed by: STUDENT IN AN ORGANIZED HEALTH CARE EDUCATION/TRAINING PROGRAM

## 2025-07-25 PROCEDURE — 3017F COLORECTAL CA SCREEN DOC REV: CPT | Performed by: STUDENT IN AN ORGANIZED HEALTH CARE EDUCATION/TRAINING PROGRAM

## 2025-07-25 PROCEDURE — 99214 OFFICE O/P EST MOD 30 MIN: CPT | Performed by: STUDENT IN AN ORGANIZED HEALTH CARE EDUCATION/TRAINING PROGRAM

## 2025-07-25 PROCEDURE — 1036F TOBACCO NON-USER: CPT | Performed by: STUDENT IN AN ORGANIZED HEALTH CARE EDUCATION/TRAINING PROGRAM

## 2025-07-25 RX ORDER — AMITRIPTYLINE HYDROCHLORIDE 10 MG/1
10 TABLET ORAL NIGHTLY
Qty: 30 TABLET | Refills: 1 | Status: SHIPPED | OUTPATIENT
Start: 2025-07-25

## 2025-08-21 RX ORDER — AMITRIPTYLINE HYDROCHLORIDE 10 MG/1
10 TABLET ORAL NIGHTLY
Qty: 30 TABLET | Refills: 1 | Status: SHIPPED | OUTPATIENT
Start: 2025-08-21

## 2025-08-21 RX ORDER — TIZANIDINE 2 MG/1
TABLET ORAL
Qty: 60 TABLET | Refills: 2 | Status: SHIPPED | OUTPATIENT
Start: 2025-08-21

## 2025-08-29 ENCOUNTER — OFFICE VISIT (OUTPATIENT)
Dept: PRIMARY CARE CLINIC | Age: 65
End: 2025-08-29

## 2025-08-29 VITALS
HEART RATE: 64 BPM | BODY MASS INDEX: 30.35 KG/M2 | HEIGHT: 70 IN | TEMPERATURE: 97 F | OXYGEN SATURATION: 96 % | SYSTOLIC BLOOD PRESSURE: 110 MMHG | WEIGHT: 212 LBS | DIASTOLIC BLOOD PRESSURE: 80 MMHG

## 2025-08-29 DIAGNOSIS — I10 ESSENTIAL HYPERTENSION: ICD-10-CM

## 2025-08-29 DIAGNOSIS — E11.9 TYPE 2 DIABETES MELLITUS WITHOUT COMPLICATION, WITHOUT LONG-TERM CURRENT USE OF INSULIN (HCC): Primary | ICD-10-CM

## 2025-08-29 LAB — HBA1C MFR BLD: 7.4 %

## 2025-08-29 ASSESSMENT — ENCOUNTER SYMPTOMS
SHORTNESS OF BREATH: 0
BLURRED VISION: 0
VISUAL CHANGE: 0

## 2025-09-02 DIAGNOSIS — I10 ESSENTIAL HYPERTENSION: ICD-10-CM

## 2025-09-02 RX ORDER — PANTOPRAZOLE SODIUM 40 MG/1
40 TABLET, DELAYED RELEASE ORAL DAILY
Qty: 90 TABLET | Refills: 1 | Status: SHIPPED | OUTPATIENT
Start: 2025-09-02

## 2025-09-02 RX ORDER — LISINOPRIL 40 MG/1
40 TABLET ORAL DAILY
Qty: 90 TABLET | Refills: 1 | Status: SHIPPED | OUTPATIENT
Start: 2025-09-02

## (undated) DEVICE — STONE RETRIEVAL BASKET: Brand: SEGURA HEMISPHERE

## (undated) DEVICE — FIBER LASER HOLM DISP SU200RT] LEONI FIBER OPTICS INC]

## (undated) DEVICE — BAG DRNGE COMB PK

## (undated) DEVICE — SWABSTICK SURG PREP BENZOIN TINCTURE SINGLE ST

## (undated) DEVICE — JELLY LIDOCAINE 2% UROJET PFS 25X5ML

## (undated) DEVICE — CAMERA STRYKER 1488 HD GEN

## (undated) DEVICE — GLOVE SURG SZ 75 STD WHT LTX SYN POLYMER BEAD REINF ANTI RL

## (undated) DEVICE — SEAL ENDOSCP INSTR 7FR BX SELF SEAL

## (undated) DEVICE — SPONGE GZ W4XL4IN RAYON POLY FILL CVR W/ NONWOVEN FAB

## (undated) DEVICE — DILATOR/SHEATH SET: Brand: 8/10 DILATOR/SHEATH SET

## (undated) DEVICE — FORCEPS BX OVL CUP FEN DISPOSABLE CAP L 160CM RAD JAW 4

## (undated) DEVICE — BAG DRAINAGE CONTAINER 15 LT FLUID COLLCTN

## (undated) DEVICE — 1.5 FR, 120 CM, NITI TIPLESS STONE BASKET: Brand: HALO

## (undated) DEVICE — GOWN,SIRUS,FABRNF,XL,20/CS: Brand: MEDLINE

## (undated) DEVICE — Z DUP USE 2139333 GUIDEWIRE UROLOGICAL STR STD 0.035 IN X150 CM REG ZIPWIRE LF

## (undated) DEVICE — READY WET SKIN SCRUB TRAY-LF: Brand: MEDLINE INDUSTRIES, INC.

## (undated) DEVICE — Z INACTIVE USE 2635503 SOLUTION IRRIG 3000ML ST H2O USP UROMATIC PLAS CONT

## (undated) DEVICE — CATHETER URET 5FR L70CM OPN END SGL LUMN INJ HUB FLEXIMA

## (undated) DEVICE — ADAPTER CATHETER PLAS FOR 4 6FR URET CATHETER

## (undated) DEVICE — Z INACTIVE USE 2660664 SOLUTION IRRIG 3000ML 0.9% SOD CHL USP UROMATIC PLAS CONT

## (undated) DEVICE — SOLUTION IV IRRIG WATER 1000ML POUR BRL 2F7114

## (undated) DEVICE — CYSTO PACK: Brand: MEDLINE INDUSTRIES, INC.

## (undated) DEVICE — GARMENT,MEDLINE,DVT,INT,CALF,MED, GEN2: Brand: MEDLINE

## (undated) DEVICE — BLOCK BITE 60FR RUBBER ADLT DENTAL

## (undated) DEVICE — FORCEPS BX L240CM JAW DIA2.4MM ORNG L CAP W/ NDL DISP RAD

## (undated) DEVICE — GRADUATE TRIANG MEASURE 1000ML BLK PRNT

## (undated) DEVICE — MEDIA CONTRAST RX ISOVUE-300 61% 30ML VIALS